# Patient Record
Sex: FEMALE | Race: WHITE | NOT HISPANIC OR LATINO | Employment: UNEMPLOYED | URBAN - METROPOLITAN AREA
[De-identification: names, ages, dates, MRNs, and addresses within clinical notes are randomized per-mention and may not be internally consistent; named-entity substitution may affect disease eponyms.]

---

## 2021-10-28 ENCOUNTER — HOSPITAL ENCOUNTER (INPATIENT)
Facility: HOSPITAL | Age: 41
LOS: 3 days | Discharge: HOME | DRG: 546 | End: 2021-10-31
Attending: EMERGENCY MEDICINE | Admitting: HOSPITALIST
Payer: COMMERCIAL

## 2021-10-28 ENCOUNTER — OFFICE VISIT (OUTPATIENT)
Dept: RHEUMATOLOGY | Facility: CLINIC | Age: 41
End: 2021-10-28
Payer: COMMERCIAL

## 2021-10-28 VITALS — WEIGHT: 293 LBS | HEIGHT: 65 IN | BODY MASS INDEX: 48.82 KG/M2

## 2021-10-28 DIAGNOSIS — M31.4 TAKAYASU'S ARTERITIS: Primary | ICD-10-CM

## 2021-10-28 DIAGNOSIS — M31.4 ARTERITIS, TAKAYASU: ICD-10-CM

## 2021-10-28 DIAGNOSIS — R07.9 CHEST PAIN: ICD-10-CM

## 2021-10-28 PROBLEM — E66.01 SEVERE OBESITY (BMI >= 40): Status: ACTIVE | Noted: 2021-10-28

## 2021-10-28 PROBLEM — R79.82 ELEVATED C-REACTIVE PROTEIN (CRP): Status: ACTIVE | Noted: 2021-10-28

## 2021-10-28 PROBLEM — F17.210 SMOKING GREATER THAN 20 PACK YEARS: Status: ACTIVE | Noted: 2021-10-28

## 2021-10-28 PROBLEM — R10.9 ABDOMINAL PAIN: Status: ACTIVE | Noted: 2021-10-28

## 2021-10-28 LAB
ALBUMIN SERPL BCP-MCNC: 4 G/DL (ref 3.5–5.2)
ALP SERPL-CCNC: 65 U/L (ref 55–135)
ALT SERPL W/O P-5'-P-CCNC: 37 U/L (ref 10–44)
ANION GAP SERPL CALC-SCNC: 10 MMOL/L (ref 8–16)
AST SERPL-CCNC: 23 U/L (ref 10–40)
BASOPHILS # BLD AUTO: 0.03 K/UL (ref 0–0.2)
BASOPHILS NFR BLD: 0.3 % (ref 0–1.9)
BILIRUB SERPL-MCNC: 0.4 MG/DL (ref 0.1–1)
BILIRUB UR QL STRIP: NEGATIVE
BNP SERPL-MCNC: <10 PG/ML (ref 0–99)
BUN SERPL-MCNC: 10 MG/DL (ref 6–20)
CALCIUM SERPL-MCNC: 9.7 MG/DL (ref 8.7–10.5)
CHLORIDE SERPL-SCNC: 103 MMOL/L (ref 95–110)
CLARITY UR REFRACT.AUTO: CLEAR
CO2 SERPL-SCNC: 23 MMOL/L (ref 23–29)
COLOR UR AUTO: YELLOW
CREAT SERPL-MCNC: 0.6 MG/DL (ref 0.5–1.4)
CRP SERPL-MCNC: 9.1 MG/L (ref 0–8.2)
CTP QC/QA: YES
DIFFERENTIAL METHOD: NORMAL
EOSINOPHIL # BLD AUTO: 0.2 K/UL (ref 0–0.5)
EOSINOPHIL NFR BLD: 2.6 % (ref 0–8)
ERYTHROCYTE [DISTWIDTH] IN BLOOD BY AUTOMATED COUNT: 13.1 % (ref 11.5–14.5)
ERYTHROCYTE [SEDIMENTATION RATE] IN BLOOD BY WESTERGREN METHOD: 21 MM/HR (ref 0–36)
EST. GFR  (AFRICAN AMERICAN): >60 ML/MIN/1.73 M^2
EST. GFR  (NON AFRICAN AMERICAN): >60 ML/MIN/1.73 M^2
ESTIMATED AVG GLUCOSE: 94 MG/DL (ref 68–131)
GLUCOSE SERPL-MCNC: 80 MG/DL (ref 70–110)
GLUCOSE UR QL STRIP: NEGATIVE
HBA1C MFR BLD: 4.9 % (ref 4–5.6)
HCT VFR BLD AUTO: 41.2 % (ref 37–48.5)
HGB BLD-MCNC: 14.1 G/DL (ref 12–16)
HGB UR QL STRIP: NEGATIVE
IMM GRANULOCYTES # BLD AUTO: 0.03 K/UL (ref 0–0.04)
IMM GRANULOCYTES NFR BLD AUTO: 0.3 % (ref 0–0.5)
KETONES UR QL STRIP: ABNORMAL
LEUKOCYTE ESTERASE UR QL STRIP: NEGATIVE
LYMPHOCYTES # BLD AUTO: 2 K/UL (ref 1–4.8)
LYMPHOCYTES NFR BLD: 22.5 % (ref 18–48)
MCH RBC QN AUTO: 29.9 PG (ref 27–31)
MCHC RBC AUTO-ENTMCNC: 34.2 G/DL (ref 32–36)
MCV RBC AUTO: 88 FL (ref 82–98)
MONOCYTES # BLD AUTO: 0.5 K/UL (ref 0.3–1)
MONOCYTES NFR BLD: 5.7 % (ref 4–15)
NEUTROPHILS # BLD AUTO: 6.2 K/UL (ref 1.8–7.7)
NEUTROPHILS NFR BLD: 68.6 % (ref 38–73)
NITRITE UR QL STRIP: NEGATIVE
NRBC BLD-RTO: 0 /100 WBC
PH UR STRIP: 5 [PH] (ref 5–8)
PLATELET # BLD AUTO: 203 K/UL (ref 150–450)
PMV BLD AUTO: 11 FL (ref 9.2–12.9)
POTASSIUM SERPL-SCNC: 3.7 MMOL/L (ref 3.5–5.1)
PROT SERPL-MCNC: 7.2 G/DL (ref 6–8.4)
PROT UR QL STRIP: NEGATIVE
RBC # BLD AUTO: 4.71 M/UL (ref 4–5.4)
SARS-COV-2 RDRP RESP QL NAA+PROBE: NEGATIVE
SODIUM SERPL-SCNC: 136 MMOL/L (ref 136–145)
SP GR UR STRIP: 1.01 (ref 1–1.03)
TROPONIN I SERPL DL<=0.01 NG/ML-MCNC: <0.006 NG/ML (ref 0–0.03)
URN SPEC COLLECT METH UR: ABNORMAL
WBC # BLD AUTO: 8.99 K/UL (ref 3.9–12.7)

## 2021-10-28 PROCEDURE — 84484 ASSAY OF TROPONIN QUANT: CPT | Performed by: EMERGENCY MEDICINE

## 2021-10-28 PROCEDURE — 3008F BODY MASS INDEX DOCD: CPT | Mod: CPTII,S$GLB,, | Performed by: INTERNAL MEDICINE

## 2021-10-28 PROCEDURE — 99223 1ST HOSP IP/OBS HIGH 75: CPT | Mod: ,,, | Performed by: HOSPITALIST

## 2021-10-28 PROCEDURE — 85025 COMPLETE CBC W/AUTO DIFF WBC: CPT | Performed by: EMERGENCY MEDICINE

## 2021-10-28 PROCEDURE — 25000003 PHARM REV CODE 250

## 2021-10-28 PROCEDURE — 86140 C-REACTIVE PROTEIN: CPT | Performed by: EMERGENCY MEDICINE

## 2021-10-28 PROCEDURE — 99285 EMERGENCY DEPT VISIT HI MDM: CPT | Mod: 25

## 2021-10-28 PROCEDURE — 83880 ASSAY OF NATRIURETIC PEPTIDE: CPT | Performed by: EMERGENCY MEDICINE

## 2021-10-28 PROCEDURE — 3008F PR BODY MASS INDEX (BMI) DOCUMENTED: ICD-10-PCS | Mod: CPTII,S$GLB,, | Performed by: INTERNAL MEDICINE

## 2021-10-28 PROCEDURE — 99284 PR EMERGENCY DEPT VISIT,LEVEL IV: ICD-10-PCS | Mod: CS,,, | Performed by: EMERGENCY MEDICINE

## 2021-10-28 PROCEDURE — 99205 OFFICE O/P NEW HI 60 MIN: CPT | Mod: S$GLB,,, | Performed by: INTERNAL MEDICINE

## 2021-10-28 PROCEDURE — 25500020 PHARM REV CODE 255: Performed by: HOSPITALIST

## 2021-10-28 PROCEDURE — 36415 COLL VENOUS BLD VENIPUNCTURE: CPT

## 2021-10-28 PROCEDURE — U0002 COVID-19 LAB TEST NON-CDC: HCPCS | Performed by: EMERGENCY MEDICINE

## 2021-10-28 PROCEDURE — 99205 PR OFFICE/OUTPT VISIT, NEW, LEVL V, 60-74 MIN: ICD-10-PCS | Mod: S$GLB,,, | Performed by: INTERNAL MEDICINE

## 2021-10-28 PROCEDURE — 80053 COMPREHEN METABOLIC PANEL: CPT | Performed by: EMERGENCY MEDICINE

## 2021-10-28 PROCEDURE — 81003 URINALYSIS AUTO W/O SCOPE: CPT | Performed by: EMERGENCY MEDICINE

## 2021-10-28 PROCEDURE — 11000001 HC ACUTE MED/SURG PRIVATE ROOM

## 2021-10-28 PROCEDURE — 99999 PR PBB SHADOW E&M-NEW PATIENT-LVL II: CPT | Mod: PBBFAC,,, | Performed by: INTERNAL MEDICINE

## 2021-10-28 PROCEDURE — 83036 HEMOGLOBIN GLYCOSYLATED A1C: CPT

## 2021-10-28 PROCEDURE — 99223 PR INITIAL HOSPITAL CARE,LEVL III: ICD-10-PCS | Mod: ,,, | Performed by: HOSPITALIST

## 2021-10-28 PROCEDURE — 99999 PR PBB SHADOW E&M-NEW PATIENT-LVL II: ICD-10-PCS | Mod: PBBFAC,,, | Performed by: INTERNAL MEDICINE

## 2021-10-28 PROCEDURE — 1159F MED LIST DOCD IN RCRD: CPT | Mod: CPTII,S$GLB,, | Performed by: INTERNAL MEDICINE

## 2021-10-28 PROCEDURE — 85652 RBC SED RATE AUTOMATED: CPT | Performed by: EMERGENCY MEDICINE

## 2021-10-28 PROCEDURE — 1159F PR MEDICATION LIST DOCUMENTED IN MEDICAL RECORD: ICD-10-PCS | Mod: CPTII,S$GLB,, | Performed by: INTERNAL MEDICINE

## 2021-10-28 PROCEDURE — 99284 EMERGENCY DEPT VISIT MOD MDM: CPT | Mod: CS,,, | Performed by: EMERGENCY MEDICINE

## 2021-10-28 RX ORDER — AMOXICILLIN 250 MG
1 CAPSULE ORAL DAILY PRN
Status: DISCONTINUED | OUTPATIENT
Start: 2021-10-28 | End: 2021-10-31 | Stop reason: HOSPADM

## 2021-10-28 RX ORDER — ENOXAPARIN SODIUM 100 MG/ML
40 INJECTION SUBCUTANEOUS EVERY 12 HOURS
Status: DISCONTINUED | OUTPATIENT
Start: 2021-10-28 | End: 2021-10-28

## 2021-10-28 RX ORDER — AMOXICILLIN 250 MG
1 CAPSULE ORAL 2 TIMES DAILY
Status: DISCONTINUED | OUTPATIENT
Start: 2021-10-28 | End: 2021-10-28

## 2021-10-28 RX ORDER — ONDANSETRON 8 MG/1
8 TABLET, ORALLY DISINTEGRATING ORAL EVERY 8 HOURS PRN
Status: DISCONTINUED | OUTPATIENT
Start: 2021-10-28 | End: 2021-10-31 | Stop reason: HOSPADM

## 2021-10-28 RX ORDER — IBUPROFEN 200 MG
24 TABLET ORAL
Status: DISCONTINUED | OUTPATIENT
Start: 2021-10-28 | End: 2021-10-31 | Stop reason: HOSPADM

## 2021-10-28 RX ORDER — IBUPROFEN 200 MG
16 TABLET ORAL
Status: DISCONTINUED | OUTPATIENT
Start: 2021-10-28 | End: 2021-10-31 | Stop reason: HOSPADM

## 2021-10-28 RX ORDER — SODIUM CHLORIDE 0.9 % (FLUSH) 0.9 %
10 SYRINGE (ML) INJECTION EVERY 12 HOURS PRN
Status: DISCONTINUED | OUTPATIENT
Start: 2021-10-28 | End: 2021-10-31 | Stop reason: HOSPADM

## 2021-10-28 RX ORDER — ACETAMINOPHEN 500 MG
1000 TABLET ORAL EVERY 8 HOURS PRN
Status: DISCONTINUED | OUTPATIENT
Start: 2021-10-28 | End: 2021-10-31 | Stop reason: HOSPADM

## 2021-10-28 RX ORDER — GLUCAGON 1 MG
1 KIT INJECTION
Status: DISCONTINUED | OUTPATIENT
Start: 2021-10-28 | End: 2021-10-31 | Stop reason: HOSPADM

## 2021-10-28 RX ORDER — IBUPROFEN 200 MG
1 TABLET ORAL DAILY
Status: DISCONTINUED | OUTPATIENT
Start: 2021-10-29 | End: 2021-10-28

## 2021-10-28 RX ORDER — NALOXONE HCL 0.4 MG/ML
0.02 VIAL (ML) INJECTION
Status: DISCONTINUED | OUTPATIENT
Start: 2021-10-28 | End: 2021-10-31 | Stop reason: HOSPADM

## 2021-10-28 RX ORDER — NAPROXEN 250 MG/1
500 TABLET ORAL NIGHTLY PRN
COMMUNITY
Start: 2021-09-16 | End: 2021-12-22

## 2021-10-28 RX ORDER — ACETAMINOPHEN 500 MG
2000 TABLET ORAL NIGHTLY PRN
Status: ON HOLD | COMMUNITY
Start: 2021-10-11 | End: 2021-10-30 | Stop reason: HOSPADM

## 2021-10-28 RX ADMIN — ACETAMINOPHEN 1000 MG: 500 TABLET ORAL at 10:10

## 2021-10-28 RX ADMIN — IOHEXOL 100 ML: 350 INJECTION, SOLUTION INTRAVENOUS at 07:10

## 2021-10-29 PROBLEM — H53.8 BLURRED VISION, RIGHT EYE: Status: ACTIVE | Noted: 2021-10-29

## 2021-10-29 LAB
ALBUMIN SERPL BCP-MCNC: 3.5 G/DL (ref 3.5–5.2)
ALP SERPL-CCNC: 66 U/L (ref 55–135)
ALT SERPL W/O P-5'-P-CCNC: 30 U/L (ref 10–44)
ANION GAP SERPL CALC-SCNC: 10 MMOL/L (ref 8–16)
ASCENDING AORTA: 3.29 CM
AST SERPL-CCNC: 17 U/L (ref 10–40)
AV INDEX (PROSTH): 0.48
AV MEAN GRADIENT: 5 MMHG
AV PEAK GRADIENT: 7 MMHG
AV VALVE AREA: 1.64 CM2
AV VELOCITY RATIO: 0.53
BASOPHILS # BLD AUTO: 0.04 K/UL (ref 0–0.2)
BASOPHILS NFR BLD: 0.6 % (ref 0–1.9)
BILIRUB SERPL-MCNC: 0.3 MG/DL (ref 0.1–1)
BSA FOR ECHO PROCEDURE: 2.65 M2
BUN SERPL-MCNC: 11 MG/DL (ref 6–20)
CALCIUM SERPL-MCNC: 9.1 MG/DL (ref 8.7–10.5)
CHLORIDE SERPL-SCNC: 104 MMOL/L (ref 95–110)
CO2 SERPL-SCNC: 23 MMOL/L (ref 23–29)
CREAT SERPL-MCNC: 0.6 MG/DL (ref 0.5–1.4)
CV ECHO LV RWT: 0.29 CM
DIFFERENTIAL METHOD: NORMAL
DOP CALC AO PEAK VEL: 1.28 M/S
DOP CALC AO VTI: 26.97 CM
DOP CALC LVOT AREA: 3.4 CM2
DOP CALC LVOT DIAMETER: 2.09 CM
DOP CALC LVOT PEAK VEL: 0.68 M/S
DOP CALC LVOT STROKE VOLUME: 44.34 CM3
DOP CALCLVOT PEAK VEL VTI: 12.93 CM
E WAVE DECELERATION TIME: 140.46 MSEC
E/A RATIO: 0.96
E/E' RATIO: 8.11 M/S
ECHO LV POSTERIOR WALL: 0.75 CM (ref 0.6–1.1)
EJECTION FRACTION: 50 %
EOSINOPHIL # BLD AUTO: 0.2 K/UL (ref 0–0.5)
EOSINOPHIL NFR BLD: 3.4 % (ref 0–8)
ERYTHROCYTE [DISTWIDTH] IN BLOOD BY AUTOMATED COUNT: 12.9 % (ref 11.5–14.5)
EST. GFR  (AFRICAN AMERICAN): >60 ML/MIN/1.73 M^2
EST. GFR  (NON AFRICAN AMERICAN): >60 ML/MIN/1.73 M^2
FRACTIONAL SHORTENING: 29 % (ref 28–44)
GLUCOSE SERPL-MCNC: 100 MG/DL (ref 70–110)
HCT VFR BLD AUTO: 38.1 % (ref 37–48.5)
HGB BLD-MCNC: 12.8 G/DL (ref 12–16)
IMM GRANULOCYTES # BLD AUTO: 0.02 K/UL (ref 0–0.04)
IMM GRANULOCYTES NFR BLD AUTO: 0.3 % (ref 0–0.5)
INTERVENTRICULAR SEPTUM: 0.88 CM (ref 0.6–1.1)
IVRT: 142.72 MSEC
LA MAJOR: 5.61 CM
LA MINOR: 5.55 CM
LA WIDTH: 3.69 CM
LEFT ATRIUM SIZE: 3.65 CM
LEFT ATRIUM VOLUME INDEX MOD: 24.3 ML/M2
LEFT ATRIUM VOLUME INDEX: 25.9 ML/M2
LEFT ATRIUM VOLUME MOD: 59.9 CM3
LEFT ATRIUM VOLUME: 63.88 CM3
LEFT INTERNAL DIMENSION IN SYSTOLE: 3.68 CM (ref 2.1–4)
LEFT VENTRICLE DIASTOLIC VOLUME INDEX: 52.74 ML/M2
LEFT VENTRICLE DIASTOLIC VOLUME: 130.27 ML
LEFT VENTRICLE MASS INDEX: 60 G/M2
LEFT VENTRICLE SYSTOLIC VOLUME INDEX: 23.2 ML/M2
LEFT VENTRICLE SYSTOLIC VOLUME: 57.2 ML
LEFT VENTRICULAR INTERNAL DIMENSION IN DIASTOLE: 5.21 CM (ref 3.5–6)
LEFT VENTRICULAR MASS: 149.19 G
LV LATERAL E/E' RATIO: 7.7 M/S
LV SEPTAL E/E' RATIO: 8.56 M/S
LYMPHOCYTES # BLD AUTO: 1.9 K/UL (ref 1–4.8)
LYMPHOCYTES NFR BLD: 28.5 % (ref 18–48)
MAGNESIUM SERPL-MCNC: 1.8 MG/DL (ref 1.6–2.6)
MCH RBC QN AUTO: 29.5 PG (ref 27–31)
MCHC RBC AUTO-ENTMCNC: 33.6 G/DL (ref 32–36)
MCV RBC AUTO: 88 FL (ref 82–98)
MONOCYTES # BLD AUTO: 0.6 K/UL (ref 0.3–1)
MONOCYTES NFR BLD: 8.2 % (ref 4–15)
MV PEAK A VEL: 0.8 M/S
MV PEAK E VEL: 0.77 M/S
MV STENOSIS PRESSURE HALF TIME: 40.73 MS
MV VALVE AREA P 1/2 METHOD: 5.4 CM2
NEUTROPHILS # BLD AUTO: 3.9 K/UL (ref 1.8–7.7)
NEUTROPHILS NFR BLD: 59 % (ref 38–73)
NRBC BLD-RTO: 0 /100 WBC
PHOSPHATE SERPL-MCNC: 3.5 MG/DL (ref 2.7–4.5)
PLATELET # BLD AUTO: 190 K/UL (ref 150–450)
PMV BLD AUTO: 10.9 FL (ref 9.2–12.9)
POTASSIUM SERPL-SCNC: 3.6 MMOL/L (ref 3.5–5.1)
PROT SERPL-MCNC: 6.4 G/DL (ref 6–8.4)
RA MAJOR: 5.16 CM
RA PRESSURE: 3 MMHG
RA WIDTH: 3.25 CM
RBC # BLD AUTO: 4.34 M/UL (ref 4–5.4)
RV TISSUE DOPPLER FREE WALL SYSTOLIC VELOCITY 1 (APICAL 4 CHAMBER VIEW): 12.65 CM/S
SINUS: 3.26 CM
SODIUM SERPL-SCNC: 137 MMOL/L (ref 136–145)
STJ: 2.65 CM
TDI LATERAL: 0.1 M/S
TDI SEPTAL: 0.09 M/S
TDI: 0.1 M/S
TRICUSPID ANNULAR PLANE SYSTOLIC EXCURSION: 2.16 CM
WBC # BLD AUTO: 6.67 K/UL (ref 3.9–12.7)

## 2021-10-29 PROCEDURE — 99222 1ST HOSP IP/OBS MODERATE 55: CPT | Mod: ,,, | Performed by: INTERNAL MEDICINE

## 2021-10-29 PROCEDURE — 87389 HIV-1 AG W/HIV-1&-2 AB AG IA: CPT | Performed by: STUDENT IN AN ORGANIZED HEALTH CARE EDUCATION/TRAINING PROGRAM

## 2021-10-29 PROCEDURE — 86803 HEPATITIS C AB TEST: CPT | Performed by: STUDENT IN AN ORGANIZED HEALTH CARE EDUCATION/TRAINING PROGRAM

## 2021-10-29 PROCEDURE — 25000003 PHARM REV CODE 250

## 2021-10-29 PROCEDURE — 99232 SBSQ HOSP IP/OBS MODERATE 35: CPT | Mod: ,,, | Performed by: HOSPITALIST

## 2021-10-29 PROCEDURE — 25500020 PHARM REV CODE 255: Performed by: HOSPITALIST

## 2021-10-29 PROCEDURE — 86480 TB TEST CELL IMMUN MEASURE: CPT | Performed by: STUDENT IN AN ORGANIZED HEALTH CARE EDUCATION/TRAINING PROGRAM

## 2021-10-29 PROCEDURE — 99232 PR SUBSEQUENT HOSPITAL CARE,LEVL II: ICD-10-PCS | Mod: ,,, | Performed by: HOSPITALIST

## 2021-10-29 PROCEDURE — 80053 COMPREHEN METABOLIC PANEL: CPT

## 2021-10-29 PROCEDURE — 86592 SYPHILIS TEST NON-TREP QUAL: CPT | Performed by: STUDENT IN AN ORGANIZED HEALTH CARE EDUCATION/TRAINING PROGRAM

## 2021-10-29 PROCEDURE — 86682 HELMINTH ANTIBODY: CPT | Performed by: STUDENT IN AN ORGANIZED HEALTH CARE EDUCATION/TRAINING PROGRAM

## 2021-10-29 PROCEDURE — 86787 VARICELLA-ZOSTER ANTIBODY: CPT | Performed by: STUDENT IN AN ORGANIZED HEALTH CARE EDUCATION/TRAINING PROGRAM

## 2021-10-29 PROCEDURE — 36415 COLL VENOUS BLD VENIPUNCTURE: CPT

## 2021-10-29 PROCEDURE — 84100 ASSAY OF PHOSPHORUS: CPT

## 2021-10-29 PROCEDURE — 86704 HEP B CORE ANTIBODY TOTAL: CPT | Performed by: STUDENT IN AN ORGANIZED HEALTH CARE EDUCATION/TRAINING PROGRAM

## 2021-10-29 PROCEDURE — 99223 1ST HOSP IP/OBS HIGH 75: CPT | Mod: ,,, | Performed by: SURGERY

## 2021-10-29 PROCEDURE — 83520 IMMUNOASSAY QUANT NOS NONAB: CPT | Performed by: STUDENT IN AN ORGANIZED HEALTH CARE EDUCATION/TRAINING PROGRAM

## 2021-10-29 PROCEDURE — 85025 COMPLETE CBC W/AUTO DIFF WBC: CPT

## 2021-10-29 PROCEDURE — 87340 HEPATITIS B SURFACE AG IA: CPT | Performed by: STUDENT IN AN ORGANIZED HEALTH CARE EDUCATION/TRAINING PROGRAM

## 2021-10-29 PROCEDURE — 99222 PR INITIAL HOSPITAL CARE,LEVL II: ICD-10-PCS | Mod: ,,, | Performed by: INTERNAL MEDICINE

## 2021-10-29 PROCEDURE — 99223 PR INITIAL HOSPITAL CARE,LEVL III: ICD-10-PCS | Mod: ,,, | Performed by: SURGERY

## 2021-10-29 PROCEDURE — 83735 ASSAY OF MAGNESIUM: CPT

## 2021-10-29 PROCEDURE — 11000001 HC ACUTE MED/SURG PRIVATE ROOM

## 2021-10-29 PROCEDURE — 86706 HEP B SURFACE ANTIBODY: CPT | Performed by: STUDENT IN AN ORGANIZED HEALTH CARE EDUCATION/TRAINING PROGRAM

## 2021-10-29 PROCEDURE — 86790 VIRUS ANTIBODY NOS: CPT | Performed by: STUDENT IN AN ORGANIZED HEALTH CARE EDUCATION/TRAINING PROGRAM

## 2021-10-29 RX ORDER — CALCIUM CARBONATE 200(500)MG
1000 TABLET,CHEWABLE ORAL DAILY
Status: DISCONTINUED | OUTPATIENT
Start: 2021-10-30 | End: 2021-10-31 | Stop reason: HOSPADM

## 2021-10-29 RX ORDER — PANTOPRAZOLE SODIUM 40 MG/1
40 TABLET, DELAYED RELEASE ORAL DAILY
Status: DISCONTINUED | OUTPATIENT
Start: 2021-10-30 | End: 2021-10-31 | Stop reason: HOSPADM

## 2021-10-29 RX ORDER — SULFAMETHOXAZOLE AND TRIMETHOPRIM 800; 160 MG/1; MG/1
1 TABLET ORAL DAILY
Status: DISCONTINUED | OUTPATIENT
Start: 2021-10-30 | End: 2021-10-31 | Stop reason: HOSPADM

## 2021-10-29 RX ORDER — PREDNISONE 20 MG/1
40 TABLET ORAL DAILY
Status: DISCONTINUED | OUTPATIENT
Start: 2021-10-30 | End: 2021-10-31 | Stop reason: HOSPADM

## 2021-10-29 RX ORDER — CHOLECALCIFEROL (VITAMIN D3) 25 MCG
1000 TABLET ORAL DAILY
Status: DISCONTINUED | OUTPATIENT
Start: 2021-10-30 | End: 2021-10-31 | Stop reason: HOSPADM

## 2021-10-29 RX ADMIN — ACETAMINOPHEN 1000 MG: 500 TABLET ORAL at 01:10

## 2021-10-29 RX ADMIN — IOHEXOL 100 ML: 350 INJECTION, SOLUTION INTRAVENOUS at 04:10

## 2021-10-29 RX ADMIN — HUMAN ALBUMIN MICROSPHERES AND PERFLUTREN 0.66 MG: 10; .22 INJECTION, SOLUTION INTRAVENOUS at 10:10

## 2021-10-30 VITALS
OXYGEN SATURATION: 91 % | HEIGHT: 65 IN | SYSTOLIC BLOOD PRESSURE: 127 MMHG | RESPIRATION RATE: 18 BRPM | DIASTOLIC BLOOD PRESSURE: 61 MMHG | HEART RATE: 74 BPM | WEIGHT: 293 LBS | TEMPERATURE: 98 F | BODY MASS INDEX: 48.82 KG/M2

## 2021-10-30 LAB
ALBUMIN SERPL BCP-MCNC: 3.5 G/DL (ref 3.5–5.2)
ALP SERPL-CCNC: 63 U/L (ref 55–135)
ALT SERPL W/O P-5'-P-CCNC: 30 U/L (ref 10–44)
ANION GAP SERPL CALC-SCNC: 10 MMOL/L (ref 8–16)
AST SERPL-CCNC: 15 U/L (ref 10–40)
BASOPHILS # BLD AUTO: 0.03 K/UL (ref 0–0.2)
BASOPHILS NFR BLD: 0.5 % (ref 0–1.9)
BILIRUB SERPL-MCNC: 0.3 MG/DL (ref 0.1–1)
BUN SERPL-MCNC: 8 MG/DL (ref 6–20)
CALCIUM SERPL-MCNC: 9.1 MG/DL (ref 8.7–10.5)
CHLORIDE SERPL-SCNC: 105 MMOL/L (ref 95–110)
CO2 SERPL-SCNC: 23 MMOL/L (ref 23–29)
CREAT SERPL-MCNC: 0.6 MG/DL (ref 0.5–1.4)
DIFFERENTIAL METHOD: NORMAL
EOSINOPHIL # BLD AUTO: 0.2 K/UL (ref 0–0.5)
EOSINOPHIL NFR BLD: 2.9 % (ref 0–8)
ERYTHROCYTE [DISTWIDTH] IN BLOOD BY AUTOMATED COUNT: 12.9 % (ref 11.5–14.5)
EST. GFR  (AFRICAN AMERICAN): >60 ML/MIN/1.73 M^2
EST. GFR  (NON AFRICAN AMERICAN): >60 ML/MIN/1.73 M^2
GLUCOSE SERPL-MCNC: 96 MG/DL (ref 70–110)
HCT VFR BLD AUTO: 39.2 % (ref 37–48.5)
HGB BLD-MCNC: 12.7 G/DL (ref 12–16)
IMM GRANULOCYTES # BLD AUTO: 0.02 K/UL (ref 0–0.04)
IMM GRANULOCYTES NFR BLD AUTO: 0.3 % (ref 0–0.5)
LYMPHOCYTES # BLD AUTO: 1.5 K/UL (ref 1–4.8)
LYMPHOCYTES NFR BLD: 24.5 % (ref 18–48)
MAGNESIUM SERPL-MCNC: 1.8 MG/DL (ref 1.6–2.6)
MCH RBC QN AUTO: 28.9 PG (ref 27–31)
MCHC RBC AUTO-ENTMCNC: 32.4 G/DL (ref 32–36)
MCV RBC AUTO: 89 FL (ref 82–98)
MONOCYTES # BLD AUTO: 0.5 K/UL (ref 0.3–1)
MONOCYTES NFR BLD: 8 % (ref 4–15)
NEUTROPHILS # BLD AUTO: 4 K/UL (ref 1.8–7.7)
NEUTROPHILS NFR BLD: 63.8 % (ref 38–73)
NRBC BLD-RTO: 0 /100 WBC
PHOSPHATE SERPL-MCNC: 2.8 MG/DL (ref 2.7–4.5)
PLATELET # BLD AUTO: 182 K/UL (ref 150–450)
PMV BLD AUTO: 10.9 FL (ref 9.2–12.9)
POTASSIUM SERPL-SCNC: 4 MMOL/L (ref 3.5–5.1)
PROT SERPL-MCNC: 6.5 G/DL (ref 6–8.4)
RBC # BLD AUTO: 4.4 M/UL (ref 4–5.4)
RPR SER QL: NORMAL
SODIUM SERPL-SCNC: 138 MMOL/L (ref 136–145)
WBC # BLD AUTO: 6.28 K/UL (ref 3.9–12.7)

## 2021-10-30 PROCEDURE — 11000001 HC ACUTE MED/SURG PRIVATE ROOM

## 2021-10-30 PROCEDURE — 36415 COLL VENOUS BLD VENIPUNCTURE: CPT

## 2021-10-30 PROCEDURE — 25000003 PHARM REV CODE 250: Performed by: STUDENT IN AN ORGANIZED HEALTH CARE EDUCATION/TRAINING PROGRAM

## 2021-10-30 PROCEDURE — 25500020 PHARM REV CODE 255: Performed by: HOSPITALIST

## 2021-10-30 PROCEDURE — 63600175 PHARM REV CODE 636 W HCPCS: Performed by: STUDENT IN AN ORGANIZED HEALTH CARE EDUCATION/TRAINING PROGRAM

## 2021-10-30 PROCEDURE — 25000003 PHARM REV CODE 250

## 2021-10-30 PROCEDURE — 80053 COMPREHEN METABOLIC PANEL: CPT

## 2021-10-30 PROCEDURE — 99239 HOSP IP/OBS DSCHRG MGMT >30: CPT | Mod: ,,, | Performed by: HOSPITALIST

## 2021-10-30 PROCEDURE — 85025 COMPLETE CBC W/AUTO DIFF WBC: CPT

## 2021-10-30 PROCEDURE — 99239 PR HOSPITAL DISCHARGE DAY,>30 MIN: ICD-10-PCS | Mod: ,,, | Performed by: HOSPITALIST

## 2021-10-30 PROCEDURE — 84100 ASSAY OF PHOSPHORUS: CPT

## 2021-10-30 PROCEDURE — 83735 ASSAY OF MAGNESIUM: CPT

## 2021-10-30 RX ORDER — PREDNISONE 20 MG/1
40 TABLET ORAL DAILY
Qty: 60 TABLET | Refills: 2 | Status: SHIPPED | OUTPATIENT
Start: 2021-10-31 | End: 2021-12-22

## 2021-10-30 RX ORDER — DICYCLOMINE HYDROCHLORIDE 10 MG/5ML
20 SOLUTION ORAL ONCE
Status: DISCONTINUED | OUTPATIENT
Start: 2021-10-30 | End: 2021-10-31 | Stop reason: HOSPADM

## 2021-10-30 RX ORDER — CALCIUM CARBONATE 200(500)MG
1000 TABLET,CHEWABLE ORAL DAILY
Qty: 60 TABLET | Refills: 11 | Status: SHIPPED | OUTPATIENT
Start: 2021-10-31 | End: 2023-01-03

## 2021-10-30 RX ORDER — ACETAMINOPHEN 500 MG
1000 TABLET ORAL EVERY 8 HOURS PRN
Qty: 42 TABLET | Refills: 0 | Status: SHIPPED | OUTPATIENT
Start: 2021-10-30

## 2021-10-30 RX ORDER — ALUMINUM HYDROXIDE, MAGNESIUM HYDROXIDE, AND SIMETHICONE 2400; 240; 2400 MG/30ML; MG/30ML; MG/30ML
30 SUSPENSION ORAL EVERY 6 HOURS PRN
Qty: 1000 ML | Refills: 0 | Status: SHIPPED | OUTPATIENT
Start: 2021-10-30 | End: 2021-12-03

## 2021-10-30 RX ORDER — PANTOPRAZOLE SODIUM 40 MG/1
40 TABLET, DELAYED RELEASE ORAL DAILY
Qty: 30 TABLET | Refills: 11 | Status: SHIPPED | OUTPATIENT
Start: 2021-10-31 | End: 2022-02-15

## 2021-10-30 RX ORDER — CHOLECALCIFEROL (VITAMIN D3) 25 MCG
1000 TABLET ORAL DAILY
Qty: 30 TABLET | Refills: 2 | Status: SHIPPED | OUTPATIENT
Start: 2021-10-31

## 2021-10-30 RX ORDER — SULFAMETHOXAZOLE AND TRIMETHOPRIM 800; 160 MG/1; MG/1
1 TABLET ORAL DAILY
Qty: 30 TABLET | Refills: 1 | Status: SHIPPED | OUTPATIENT
Start: 2021-10-31 | End: 2021-11-15

## 2021-10-30 RX ORDER — LIDOCAINE HYDROCHLORIDE 20 MG/ML
10 SOLUTION OROPHARYNGEAL ONCE
Status: DISCONTINUED | OUTPATIENT
Start: 2021-10-30 | End: 2021-10-31 | Stop reason: HOSPADM

## 2021-10-30 RX ORDER — ALUMINUM HYDROXIDE, MAGNESIUM HYDROXIDE, AND SIMETHICONE 2400; 240; 2400 MG/30ML; MG/30ML; MG/30ML
30 SUSPENSION ORAL ONCE
Status: DISCONTINUED | OUTPATIENT
Start: 2021-10-30 | End: 2021-10-31 | Stop reason: HOSPADM

## 2021-10-30 RX ADMIN — PANTOPRAZOLE SODIUM 40 MG: 40 TABLET, DELAYED RELEASE ORAL at 09:10

## 2021-10-30 RX ADMIN — IOHEXOL 100 ML: 350 INJECTION, SOLUTION INTRAVENOUS at 06:10

## 2021-10-30 RX ADMIN — ACETAMINOPHEN 1000 MG: 500 TABLET ORAL at 09:10

## 2021-10-30 RX ADMIN — PREDNISONE 40 MG: 20 TABLET ORAL at 09:10

## 2021-10-30 RX ADMIN — Medication 1000 UNITS: at 09:10

## 2021-10-30 RX ADMIN — SULFAMETHOXAZOLE AND TRIMETHOPRIM 1 TABLET: 800; 160 TABLET ORAL at 09:10

## 2021-10-30 RX ADMIN — CALCIUM CARBONATE (ANTACID) CHEW TAB 500 MG 1000 MG: 500 CHEW TAB at 09:10

## 2021-11-01 ENCOUNTER — DOCUMENTATION ONLY (OUTPATIENT)
Dept: RHEUMATOLOGY | Facility: CLINIC | Age: 41
End: 2021-11-01
Payer: COMMERCIAL

## 2021-11-01 LAB
GAMMA INTERFERON BACKGROUND BLD IA-ACNC: 0.04 IU/ML
HBV CORE AB SERPL QL IA: NEGATIVE
HBV SURFACE AB SER-ACNC: POSITIVE M[IU]/ML
HBV SURFACE AG SERPL QL IA: NEGATIVE
HCV AB SERPL QL IA: NEGATIVE
HEPATITIS A ANTIBODY, IGG: NEGATIVE
HIV 1+2 AB+HIV1 P24 AG SERPL QL IA: NEGATIVE
IL6 SERPL-MCNC: 3.4 PG/ML
M TB IFN-G CD4+ BCKGRND COR BLD-ACNC: 0.01 IU/ML
MITOGEN IGNF BCKGRD COR BLD-ACNC: 8.62 IU/ML
STRONGYLOIDES ANTIBODY IGG: NEGATIVE
TB GOLD PLUS: NEGATIVE
TB2 - NIL: -0.01 IU/ML

## 2021-11-02 RX ORDER — ONDANSETRON 4 MG/1
TABLET, FILM COATED ORAL
Qty: 60 TABLET | Refills: 1 | Status: SHIPPED | OUTPATIENT
Start: 2021-11-02 | End: 2022-05-12

## 2021-11-03 LAB
VARICELLA INTERPRETATION: POSITIVE
VARICELLA ZOSTER IGG: 2.62 ISR (ref 0–0.9)

## 2021-11-11 ENCOUNTER — OFFICE VISIT (OUTPATIENT)
Dept: URGENT CARE | Facility: CLINIC | Age: 41
End: 2021-11-11
Payer: COMMERCIAL

## 2021-11-11 VITALS
SYSTOLIC BLOOD PRESSURE: 139 MMHG | BODY MASS INDEX: 56.08 KG/M2 | OXYGEN SATURATION: 97 % | WEIGHT: 293 LBS | RESPIRATION RATE: 16 BRPM | TEMPERATURE: 98 F | HEART RATE: 100 BPM | DIASTOLIC BLOOD PRESSURE: 89 MMHG

## 2021-11-11 DIAGNOSIS — R11.0 NAUSEA: ICD-10-CM

## 2021-11-11 DIAGNOSIS — R50.9 FEVER, UNSPECIFIED FEVER CAUSE: Primary | ICD-10-CM

## 2021-11-11 LAB
CTP QC/QA: YES
SARS-COV-2 RDRP RESP QL NAA+PROBE: NEGATIVE

## 2021-11-11 PROCEDURE — 3044F HG A1C LEVEL LT 7.0%: CPT | Mod: CPTII,S$GLB,, | Performed by: EMERGENCY MEDICINE

## 2021-11-11 PROCEDURE — 3075F SYST BP GE 130 - 139MM HG: CPT | Mod: CPTII,S$GLB,, | Performed by: EMERGENCY MEDICINE

## 2021-11-11 PROCEDURE — 71046 XR CHEST PA AND LATERAL: ICD-10-PCS | Mod: S$GLB,,, | Performed by: RADIOLOGY

## 2021-11-11 PROCEDURE — 1160F RVW MEDS BY RX/DR IN RCRD: CPT | Mod: CPTII,S$GLB,, | Performed by: EMERGENCY MEDICINE

## 2021-11-11 PROCEDURE — 1160F PR REVIEW ALL MEDS BY PRESCRIBER/CLIN PHARMACIST DOCUMENTED: ICD-10-PCS | Mod: CPTII,S$GLB,, | Performed by: EMERGENCY MEDICINE

## 2021-11-11 PROCEDURE — 71046 X-RAY EXAM CHEST 2 VIEWS: CPT | Mod: S$GLB,,, | Performed by: RADIOLOGY

## 2021-11-11 PROCEDURE — 99203 PR OFFICE/OUTPT VISIT, NEW, LEVL III, 30-44 MIN: ICD-10-PCS | Mod: S$GLB,,, | Performed by: EMERGENCY MEDICINE

## 2021-11-11 PROCEDURE — 1159F MED LIST DOCD IN RCRD: CPT | Mod: CPTII,S$GLB,, | Performed by: EMERGENCY MEDICINE

## 2021-11-11 PROCEDURE — 3008F BODY MASS INDEX DOCD: CPT | Mod: CPTII,S$GLB,, | Performed by: EMERGENCY MEDICINE

## 2021-11-11 PROCEDURE — 3079F PR MOST RECENT DIASTOLIC BLOOD PRESSURE 80-89 MM HG: ICD-10-PCS | Mod: CPTII,S$GLB,, | Performed by: EMERGENCY MEDICINE

## 2021-11-11 PROCEDURE — 3075F PR MOST RECENT SYSTOLIC BLOOD PRESS GE 130-139MM HG: ICD-10-PCS | Mod: CPTII,S$GLB,, | Performed by: EMERGENCY MEDICINE

## 2021-11-11 PROCEDURE — 1159F PR MEDICATION LIST DOCUMENTED IN MEDICAL RECORD: ICD-10-PCS | Mod: CPTII,S$GLB,, | Performed by: EMERGENCY MEDICINE

## 2021-11-11 PROCEDURE — 3044F PR MOST RECENT HEMOGLOBIN A1C LEVEL <7.0%: ICD-10-PCS | Mod: CPTII,S$GLB,, | Performed by: EMERGENCY MEDICINE

## 2021-11-11 PROCEDURE — 3079F DIAST BP 80-89 MM HG: CPT | Mod: CPTII,S$GLB,, | Performed by: EMERGENCY MEDICINE

## 2021-11-11 PROCEDURE — U0002 COVID-19 LAB TEST NON-CDC: HCPCS | Mod: QW,S$GLB,, | Performed by: EMERGENCY MEDICINE

## 2021-11-11 PROCEDURE — 3008F PR BODY MASS INDEX (BMI) DOCUMENTED: ICD-10-PCS | Mod: CPTII,S$GLB,, | Performed by: EMERGENCY MEDICINE

## 2021-11-11 PROCEDURE — U0002: ICD-10-PCS | Mod: QW,S$GLB,, | Performed by: EMERGENCY MEDICINE

## 2021-11-11 PROCEDURE — 99203 OFFICE O/P NEW LOW 30 MIN: CPT | Mod: S$GLB,,, | Performed by: EMERGENCY MEDICINE

## 2021-11-11 RX ORDER — PROMETHAZINE HYDROCHLORIDE 25 MG/1
25 TABLET ORAL EVERY 4 HOURS
Qty: 20 TABLET | Refills: 0 | Status: SHIPPED | OUTPATIENT
Start: 2021-11-11 | End: 2021-11-15

## 2021-11-15 ENCOUNTER — OFFICE VISIT (OUTPATIENT)
Dept: RHEUMATOLOGY | Facility: CLINIC | Age: 41
End: 2021-11-15
Payer: COMMERCIAL

## 2021-11-15 VITALS — WEIGHT: 293 LBS | BODY MASS INDEX: 57.74 KG/M2

## 2021-11-15 DIAGNOSIS — R11.0 NAUSEA: ICD-10-CM

## 2021-11-15 DIAGNOSIS — M31.4 TAKAYASU'S ARTERITIS: Primary | ICD-10-CM

## 2021-11-15 PROCEDURE — 3008F BODY MASS INDEX DOCD: CPT | Mod: CPTII,S$GLB,, | Performed by: INTERNAL MEDICINE

## 2021-11-15 PROCEDURE — 1111F PR DISCHARGE MEDS RECONCILED W/ CURRENT OUTPATIENT MED LIST: ICD-10-PCS | Mod: CPTII,S$GLB,, | Performed by: INTERNAL MEDICINE

## 2021-11-15 PROCEDURE — 99214 OFFICE O/P EST MOD 30 MIN: CPT | Mod: S$GLB,,, | Performed by: INTERNAL MEDICINE

## 2021-11-15 PROCEDURE — 1111F DSCHRG MED/CURRENT MED MERGE: CPT | Mod: CPTII,S$GLB,, | Performed by: INTERNAL MEDICINE

## 2021-11-15 PROCEDURE — 99214 PR OFFICE/OUTPT VISIT, EST, LEVL IV, 30-39 MIN: ICD-10-PCS | Mod: S$GLB,,, | Performed by: INTERNAL MEDICINE

## 2021-11-15 PROCEDURE — 99999 PR PBB SHADOW E&M-EST. PATIENT-LVL III: CPT | Mod: PBBFAC,,, | Performed by: INTERNAL MEDICINE

## 2021-11-15 PROCEDURE — 3044F HG A1C LEVEL LT 7.0%: CPT | Mod: CPTII,S$GLB,, | Performed by: INTERNAL MEDICINE

## 2021-11-15 PROCEDURE — 1159F PR MEDICATION LIST DOCUMENTED IN MEDICAL RECORD: ICD-10-PCS | Mod: CPTII,S$GLB,, | Performed by: INTERNAL MEDICINE

## 2021-11-15 PROCEDURE — 99999 PR PBB SHADOW E&M-EST. PATIENT-LVL III: ICD-10-PCS | Mod: PBBFAC,,, | Performed by: INTERNAL MEDICINE

## 2021-11-15 PROCEDURE — 3044F PR MOST RECENT HEMOGLOBIN A1C LEVEL <7.0%: ICD-10-PCS | Mod: CPTII,S$GLB,, | Performed by: INTERNAL MEDICINE

## 2021-11-15 PROCEDURE — 3008F PR BODY MASS INDEX (BMI) DOCUMENTED: ICD-10-PCS | Mod: CPTII,S$GLB,, | Performed by: INTERNAL MEDICINE

## 2021-11-15 PROCEDURE — 1159F MED LIST DOCD IN RCRD: CPT | Mod: CPTII,S$GLB,, | Performed by: INTERNAL MEDICINE

## 2021-11-15 RX ORDER — METHOTREXATE 2.5 MG/1
TABLET ORAL
Qty: 45 TABLET | Refills: 3 | Status: SHIPPED | OUTPATIENT
Start: 2021-11-15 | End: 2021-11-16

## 2021-11-15 RX ORDER — PROMETHAZINE HYDROCHLORIDE 25 MG/1
25 TABLET ORAL EVERY 4 HOURS
Qty: 60 TABLET | Refills: 0 | Status: SHIPPED | OUTPATIENT
Start: 2021-11-15 | End: 2021-12-18

## 2021-11-15 RX ORDER — FOLIC ACID 1 MG/1
1 TABLET ORAL DAILY
Qty: 30 TABLET | Refills: 4 | Status: SHIPPED | OUTPATIENT
Start: 2021-11-15 | End: 2021-12-22

## 2021-11-15 RX ORDER — SULFAMETHOXAZOLE AND TRIMETHOPRIM 800; 160 MG/1; MG/1
TABLET ORAL
Qty: 12 TABLET | Refills: 1 | Status: SHIPPED | OUTPATIENT
Start: 2021-11-15 | End: 2021-12-22

## 2021-11-16 ENCOUNTER — PATIENT MESSAGE (OUTPATIENT)
Dept: RHEUMATOLOGY | Facility: CLINIC | Age: 41
End: 2021-11-16
Payer: COMMERCIAL

## 2021-11-16 RX ORDER — METHOTREXATE 2.5 MG/1
TABLET ORAL
Qty: 45 TABLET | Refills: 3 | Status: SHIPPED | OUTPATIENT
Start: 2021-11-16 | End: 2021-12-22

## 2021-11-18 ENCOUNTER — PATIENT MESSAGE (OUTPATIENT)
Dept: RHEUMATOLOGY | Facility: CLINIC | Age: 41
End: 2021-11-18
Payer: COMMERCIAL

## 2021-11-23 ENCOUNTER — PATIENT MESSAGE (OUTPATIENT)
Dept: RHEUMATOLOGY | Facility: CLINIC | Age: 41
End: 2021-11-23
Payer: COMMERCIAL

## 2021-11-24 RX ORDER — GABAPENTIN 100 MG/1
100 CAPSULE ORAL 3 TIMES DAILY
Qty: 90 CAPSULE | Refills: 3 | Status: SHIPPED | OUTPATIENT
Start: 2021-11-24 | End: 2021-12-22

## 2021-12-03 ENCOUNTER — OFFICE VISIT (OUTPATIENT)
Dept: FAMILY MEDICINE | Facility: CLINIC | Age: 41
End: 2021-12-03
Payer: COMMERCIAL

## 2021-12-03 VITALS
BODY MASS INDEX: 48.82 KG/M2 | TEMPERATURE: 99 F | HEIGHT: 65 IN | WEIGHT: 293 LBS | DIASTOLIC BLOOD PRESSURE: 82 MMHG | SYSTOLIC BLOOD PRESSURE: 154 MMHG | OXYGEN SATURATION: 95 % | HEART RATE: 129 BPM

## 2021-12-03 DIAGNOSIS — E66.01 MORBID OBESITY: ICD-10-CM

## 2021-12-03 DIAGNOSIS — Z72.0 TOBACCO ABUSE: ICD-10-CM

## 2021-12-03 DIAGNOSIS — E66.01 SEVERE OBESITY (BMI >= 40): ICD-10-CM

## 2021-12-03 DIAGNOSIS — Z12.31 SCREENING MAMMOGRAM FOR BREAST CANCER: ICD-10-CM

## 2021-12-03 DIAGNOSIS — Z00.00 WELL ADULT EXAM: Primary | ICD-10-CM

## 2021-12-03 DIAGNOSIS — J01.00 ACUTE NON-RECURRENT MAXILLARY SINUSITIS: ICD-10-CM

## 2021-12-03 DIAGNOSIS — J06.9 UPPER RESPIRATORY TRACT INFECTION, UNSPECIFIED TYPE: ICD-10-CM

## 2021-12-03 DIAGNOSIS — M31.4 ARTERITIS, TAKAYASU: ICD-10-CM

## 2021-12-03 LAB
CTP QC/QA: YES
INFLUENZA A, MOLECULAR: NEGATIVE
INFLUENZA B, MOLECULAR: NEGATIVE
SARS-COV-2 RDRP RESP QL NAA+PROBE: NEGATIVE
SPECIMEN SOURCE: NORMAL

## 2021-12-03 PROCEDURE — 99999 PR PBB SHADOW E&M-EST. PATIENT-LVL V: CPT | Mod: PBBFAC,,, | Performed by: FAMILY MEDICINE

## 2021-12-03 PROCEDURE — 99386 PR PREVENTIVE VISIT,NEW,40-64: ICD-10-PCS | Mod: S$GLB,,, | Performed by: FAMILY MEDICINE

## 2021-12-03 PROCEDURE — U0002: ICD-10-PCS | Mod: QW,S$GLB,, | Performed by: FAMILY MEDICINE

## 2021-12-03 PROCEDURE — 99999 PR PBB SHADOW E&M-EST. PATIENT-LVL V: ICD-10-PCS | Mod: PBBFAC,,, | Performed by: FAMILY MEDICINE

## 2021-12-03 PROCEDURE — U0002 COVID-19 LAB TEST NON-CDC: HCPCS | Mod: QW,S$GLB,, | Performed by: FAMILY MEDICINE

## 2021-12-03 PROCEDURE — 87502 INFLUENZA DNA AMP PROBE: CPT | Mod: PO | Performed by: FAMILY MEDICINE

## 2021-12-03 PROCEDURE — 99386 PREV VISIT NEW AGE 40-64: CPT | Mod: S$GLB,,, | Performed by: FAMILY MEDICINE

## 2021-12-03 RX ORDER — AMOXICILLIN AND CLAVULANATE POTASSIUM 875; 125 MG/1; MG/1
1 TABLET, FILM COATED ORAL EVERY 12 HOURS
Qty: 20 TABLET | Refills: 0 | Status: SHIPPED | OUTPATIENT
Start: 2021-12-03 | End: 2021-12-13

## 2021-12-06 ENCOUNTER — PATIENT MESSAGE (OUTPATIENT)
Dept: RHEUMATOLOGY | Facility: CLINIC | Age: 41
End: 2021-12-06
Payer: COMMERCIAL

## 2021-12-12 ENCOUNTER — NURSE TRIAGE (OUTPATIENT)
Dept: ADMINISTRATIVE | Facility: CLINIC | Age: 41
End: 2021-12-12
Payer: COMMERCIAL

## 2021-12-13 ENCOUNTER — PATIENT MESSAGE (OUTPATIENT)
Dept: RHEUMATOLOGY | Facility: CLINIC | Age: 41
End: 2021-12-13
Payer: COMMERCIAL

## 2021-12-15 ENCOUNTER — TELEPHONE (OUTPATIENT)
Dept: RHEUMATOLOGY | Facility: CLINIC | Age: 41
End: 2021-12-15
Payer: COMMERCIAL

## 2021-12-16 ENCOUNTER — HOSPITAL ENCOUNTER (OUTPATIENT)
Dept: RADIOLOGY | Facility: HOSPITAL | Age: 41
Discharge: HOME | End: 2021-12-16
Attending: FAMILY MEDICINE
Payer: COMMERCIAL

## 2021-12-16 VITALS — BODY MASS INDEX: 50.02 KG/M2 | HEIGHT: 64 IN | WEIGHT: 293 LBS

## 2021-12-16 DIAGNOSIS — Z12.31 SCREENING MAMMOGRAM FOR BREAST CANCER: ICD-10-CM

## 2021-12-16 PROCEDURE — 77063 MAMMO DIGITAL SCREENING BILAT WITH TOMO: ICD-10-PCS | Mod: 26,,, | Performed by: RADIOLOGY

## 2021-12-16 PROCEDURE — 77067 SCR MAMMO BI INCL CAD: CPT | Mod: TC,PO

## 2021-12-16 PROCEDURE — 77067 SCR MAMMO BI INCL CAD: CPT | Mod: 26,,, | Performed by: RADIOLOGY

## 2021-12-16 PROCEDURE — 77063 BREAST TOMOSYNTHESIS BI: CPT | Mod: 26,,, | Performed by: RADIOLOGY

## 2021-12-16 PROCEDURE — 77067 MAMMO DIGITAL SCREENING BILAT WITH TOMO: ICD-10-PCS | Mod: 26,,, | Performed by: RADIOLOGY

## 2021-12-17 ENCOUNTER — TELEPHONE (OUTPATIENT)
Dept: VASCULAR SURGERY | Facility: CLINIC | Age: 41
End: 2021-12-17
Payer: COMMERCIAL

## 2021-12-22 ENCOUNTER — PATIENT MESSAGE (OUTPATIENT)
Dept: RHEUMATOLOGY | Facility: CLINIC | Age: 41
End: 2021-12-22

## 2021-12-22 ENCOUNTER — OFFICE VISIT (OUTPATIENT)
Dept: RHEUMATOLOGY | Facility: CLINIC | Age: 41
End: 2021-12-22
Payer: COMMERCIAL

## 2021-12-22 DIAGNOSIS — R20.2 PARESTHESIAS: ICD-10-CM

## 2021-12-22 DIAGNOSIS — G47.30 SLEEP APNEA, UNSPECIFIED TYPE: Primary | ICD-10-CM

## 2021-12-22 DIAGNOSIS — M31.4 TAKAYASU'S ARTERITIS: ICD-10-CM

## 2021-12-22 PROCEDURE — 99214 OFFICE O/P EST MOD 30 MIN: CPT | Mod: 95,,, | Performed by: INTERNAL MEDICINE

## 2021-12-22 PROCEDURE — 99214 PR OFFICE/OUTPT VISIT, EST, LEVL IV, 30-39 MIN: ICD-10-PCS | Mod: 95,,, | Performed by: INTERNAL MEDICINE

## 2021-12-22 PROCEDURE — 3044F PR MOST RECENT HEMOGLOBIN A1C LEVEL <7.0%: ICD-10-PCS | Mod: CPTII,95,, | Performed by: INTERNAL MEDICINE

## 2021-12-22 PROCEDURE — 3044F HG A1C LEVEL LT 7.0%: CPT | Mod: CPTII,95,, | Performed by: INTERNAL MEDICINE

## 2021-12-22 RX ORDER — FOLIC ACID 1 MG/1
1 TABLET ORAL 3 TIMES DAILY
Qty: 90 TABLET | Refills: 4 | Status: SHIPPED | OUTPATIENT
Start: 2021-12-22 | End: 2022-02-15

## 2021-12-22 RX ORDER — METHOTREXATE 2.5 MG/1
TABLET ORAL
Qty: 45 TABLET | Refills: 3 | Status: SHIPPED | OUTPATIENT
Start: 2021-12-22 | End: 2022-02-15

## 2021-12-22 RX ORDER — DULOXETIN HYDROCHLORIDE 30 MG/1
30 CAPSULE, DELAYED RELEASE ORAL DAILY
Qty: 30 CAPSULE | Refills: 3 | Status: SHIPPED | OUTPATIENT
Start: 2021-12-22 | End: 2022-03-22

## 2021-12-22 RX ORDER — FOLIC ACID 1 MG/1
1 TABLET ORAL DAILY
Qty: 30 TABLET | Refills: 4 | Status: SHIPPED | OUTPATIENT
Start: 2021-12-22 | End: 2021-12-22

## 2021-12-22 RX ORDER — ADALIMUMAB 40MG/0.8ML
40 KIT SUBCUTANEOUS
Qty: 2 PEN | Refills: 11 | Status: SHIPPED | OUTPATIENT
Start: 2021-12-22 | End: 2023-01-17 | Stop reason: SDUPTHER

## 2021-12-23 DIAGNOSIS — M31.4 ARTERITIS, TAKAYASU: Primary | ICD-10-CM

## 2021-12-29 ENCOUNTER — HOSPITAL ENCOUNTER (OUTPATIENT)
Dept: VASCULAR SURGERY | Facility: CLINIC | Age: 41
Discharge: HOME | End: 2021-12-29
Attending: SURGERY
Payer: COMMERCIAL

## 2021-12-29 DIAGNOSIS — I73.9 PVD (PERIPHERAL VASCULAR DISEASE): ICD-10-CM

## 2021-12-29 DIAGNOSIS — M31.4 ARTERITIS, TAKAYASU: ICD-10-CM

## 2021-12-29 DIAGNOSIS — I77.1 SUBCLAVIAN ARTERIAL STENOSIS: ICD-10-CM

## 2021-12-29 PROCEDURE — 93923 PR NON-INVASIVE PHYSIOLOGIC STUDY EXTREMITY 3 LEVELS: ICD-10-PCS | Mod: S$GLB,,, | Performed by: SURGERY

## 2021-12-29 PROCEDURE — 93930 PR DUPLEX UP EXTREM ART BILAT: ICD-10-PCS | Mod: S$GLB,,, | Performed by: SURGERY

## 2021-12-29 PROCEDURE — 93930 UPPER EXTREMITY STUDY: CPT | Mod: S$GLB,,, | Performed by: SURGERY

## 2021-12-29 PROCEDURE — 93923 UPR/LXTR ART STDY 3+ LVLS: CPT | Mod: S$GLB,,, | Performed by: SURGERY

## 2021-12-29 PROCEDURE — 93880 PR DUPLEX SCAN EXTRACRANIAL,BILAT: ICD-10-PCS | Mod: S$GLB,,, | Performed by: SURGERY

## 2021-12-29 PROCEDURE — 93880 EXTRACRANIAL BILAT STUDY: CPT | Mod: S$GLB,,, | Performed by: SURGERY

## 2022-01-12 ENCOUNTER — TELEPHONE (OUTPATIENT)
Dept: SLEEP MEDICINE | Facility: CLINIC | Age: 42
End: 2022-01-12
Payer: COMMERCIAL

## 2022-01-13 ENCOUNTER — SPECIALTY PHARMACY (OUTPATIENT)
Dept: PHARMACY | Facility: CLINIC | Age: 42
End: 2022-01-13
Payer: COMMERCIAL

## 2022-01-14 NOTE — TELEPHONE ENCOUNTER
Dona CIFUENTES done via phone with rep Garcia, decision will be sent via fax.   Reference number:  [id number]

## 2022-01-19 NOTE — TELEPHONE ENCOUNTER
MD would like to appeal. Working on appeal     Humira PA denied. Sent inbasket to [facility] for next steps

## 2022-01-20 ENCOUNTER — OFFICE VISIT (OUTPATIENT)
Dept: FAMILY MEDICINE | Facility: CLINIC | Age: 42
End: 2022-01-20
Payer: COMMERCIAL

## 2022-01-20 VITALS
BODY MASS INDEX: 50.02 KG/M2 | HEART RATE: 115 BPM | HEIGHT: 64 IN | OXYGEN SATURATION: 96 % | SYSTOLIC BLOOD PRESSURE: 110 MMHG | DIASTOLIC BLOOD PRESSURE: 62 MMHG | WEIGHT: 293 LBS

## 2022-01-20 DIAGNOSIS — M31.4 ARTERITIS, TAKAYASU: ICD-10-CM

## 2022-01-20 DIAGNOSIS — J02.9 SORE THROAT: Primary | ICD-10-CM

## 2022-01-20 DIAGNOSIS — E66.01 SEVERE OBESITY (BMI >= 40): ICD-10-CM

## 2022-01-20 LAB
CTP QC/QA: YES
SARS-COV-2 RDRP RESP QL NAA+PROBE: NEGATIVE

## 2022-01-20 PROCEDURE — U0002: ICD-10-PCS | Mod: QW,S$GLB,, | Performed by: FAMILY MEDICINE

## 2022-01-20 PROCEDURE — 99999 PR PBB SHADOW E&M-EST. PATIENT-LVL IV: ICD-10-PCS | Mod: PBBFAC,,, | Performed by: FAMILY MEDICINE

## 2022-01-20 PROCEDURE — 99213 PR OFFICE/OUTPT VISIT, EST, LEVL III, 20-29 MIN: ICD-10-PCS | Mod: S$GLB,,, | Performed by: FAMILY MEDICINE

## 2022-01-20 PROCEDURE — 99213 OFFICE O/P EST LOW 20 MIN: CPT | Mod: S$GLB,,, | Performed by: FAMILY MEDICINE

## 2022-01-20 PROCEDURE — 1160F RVW MEDS BY RX/DR IN RCRD: CPT | Mod: CPTII,S$GLB,, | Performed by: FAMILY MEDICINE

## 2022-01-20 PROCEDURE — U0002 COVID-19 LAB TEST NON-CDC: HCPCS | Mod: QW,S$GLB,, | Performed by: FAMILY MEDICINE

## 2022-01-20 PROCEDURE — 3074F PR MOST RECENT SYSTOLIC BLOOD PRESSURE < 130 MM HG: ICD-10-PCS | Mod: CPTII,S$GLB,, | Performed by: FAMILY MEDICINE

## 2022-01-20 PROCEDURE — 1159F MED LIST DOCD IN RCRD: CPT | Mod: CPTII,S$GLB,, | Performed by: FAMILY MEDICINE

## 2022-01-20 PROCEDURE — 3074F SYST BP LT 130 MM HG: CPT | Mod: CPTII,S$GLB,, | Performed by: FAMILY MEDICINE

## 2022-01-20 PROCEDURE — 3078F DIAST BP <80 MM HG: CPT | Mod: CPTII,S$GLB,, | Performed by: FAMILY MEDICINE

## 2022-01-20 PROCEDURE — 1160F PR REVIEW ALL MEDS BY PRESCRIBER/CLIN PHARMACIST DOCUMENTED: ICD-10-PCS | Mod: CPTII,S$GLB,, | Performed by: FAMILY MEDICINE

## 2022-01-20 PROCEDURE — 1159F PR MEDICATION LIST DOCUMENTED IN MEDICAL RECORD: ICD-10-PCS | Mod: CPTII,S$GLB,, | Performed by: FAMILY MEDICINE

## 2022-01-20 PROCEDURE — 3008F BODY MASS INDEX DOCD: CPT | Mod: CPTII,S$GLB,, | Performed by: FAMILY MEDICINE

## 2022-01-20 PROCEDURE — 3008F PR BODY MASS INDEX (BMI) DOCUMENTED: ICD-10-PCS | Mod: CPTII,S$GLB,, | Performed by: FAMILY MEDICINE

## 2022-01-20 PROCEDURE — 3078F PR MOST RECENT DIASTOLIC BLOOD PRESSURE < 80 MM HG: ICD-10-PCS | Mod: CPTII,S$GLB,, | Performed by: FAMILY MEDICINE

## 2022-01-20 PROCEDURE — 99999 PR PBB SHADOW E&M-EST. PATIENT-LVL IV: CPT | Mod: PBBFAC,,, | Performed by: FAMILY MEDICINE

## 2022-01-20 NOTE — PROGRESS NOTES
"Subjective:       Patient ID: Kelly Dove is a 41 y.o. female.    Chief Complaint: Headache and Sore Throat    Here today to f/u.  In the interim, she has developed congestion, sore throat and headache in the last 24 hours. She states multiple co-workers have COVID.  She is vaccinated and boosted.  She has lessened up her smoking some.  She is working to connect with smoking cessation program.    Headache   Associated symptoms include a sore throat. Pertinent negatives include no abdominal pain, coughing, dizziness, fever or nausea.   Sore Throat   Associated symptoms include congestion and headaches. Pertinent negatives include no abdominal pain, coughing, diarrhea or shortness of breath.     Review of Systems   Constitutional: Negative for activity change, appetite change and fever.   HENT: Positive for congestion and sore throat.    Respiratory: Negative for cough, shortness of breath and wheezing.    Cardiovascular: Negative for chest pain and palpitations.   Gastrointestinal: Negative for abdominal pain, constipation, diarrhea and nausea.   Skin: Negative for pallor and rash.   Neurological: Positive for headaches. Negative for dizziness, syncope and light-headedness.       Objective:      Vitals:    01/20/22 1450   BP: 110/62   BP Location: Right arm   Patient Position: Sitting   BP Method: Large (Manual)   Pulse: (!) 115   SpO2: 96%   Weight: (!) 161.5 kg (356 lb)   Height: 5' 4" (1.626 m)      Physical Exam  Constitutional:       General: She is not in acute distress.     Appearance: She is obese.   Cardiovascular:      Rate and Rhythm: Normal rate and regular rhythm.      Heart sounds: Normal heart sounds. No murmur heard.      Pulmonary:      Effort: Pulmonary effort is normal. No respiratory distress.      Breath sounds: Normal breath sounds. No wheezing, rhonchi or rales.   Neurological:      General: No focal deficit present.      Mental Status: She is alert.   Psychiatric:         Mood and Affect: " Mood normal.         Behavior: Behavior normal.         Thought Content: Thought content normal.       Rapid COVID : Neg  Assessment:       1. Sore throat    2. Arteritis, Takayasu    3. Severe obesity (BMI >= 40)        Plan:       Sore throat  -     POCT COVID-19 Rapid Screening    Arteritis, Takayasu    Severe obesity (BMI >= 40)    She is vaccinated, we discussed her self-isolating for 5 days due to her URI.  She will go back to work on Monday.  Use Mucinex and Flonase for symptoms.  BP is well controlled.  Stop smoking.      Medication List with Changes/Refills   Current Medications    ACETAMINOPHEN (TYLENOL) 500 MG TABLET    Take 2 tablets (1,000 mg total) by mouth every 8 (eight) hours as needed for Pain.    ADALIMUMAB (HUMIRA PEN) PNKT INJECTION    Inject 1 pen (40 mg total) into the skin every 14 (fourteen) days.    CALCIUM CARBONATE (TUMS) 200 MG CALCIUM (500 MG) CHEWABLE TABLET    Take 2 tablets (1,000 mg total) by mouth once daily.    DULOXETINE (CYMBALTA) 30 MG CAPSULE    Take 1 capsule (30 mg total) by mouth once daily.    FOLIC ACID (FOLVITE) 1 MG TABLET    Take 1 tablet (1 mg total) by mouth 3 (three) times daily.    METHOTREXATE 2.5 MG TAB    9 pills weekly    ONDANSETRON (ZOFRAN) 4 MG TABLET    1 to 2 tablets a day for nausea    PANTOPRAZOLE (PROTONIX) 40 MG TABLET    Take 1 tablet (40 mg total) by mouth once daily.    PNEUMOC 13-MADHURI CONJ-DIP CR,PF, (PREVNAR 13, PF,) 0.5 ML SYRG    PCV 13 first and PPSV 23 after 8 weeks    PNEUMOCOCCAL VACCINE (PNU-IMMUNE 23) 25 MCG/0.5 ML INJECTION    PCV 13 first and PPSV 23 after 8 weeks    PROMETHAZINE (PHENERGAN) 25 MG TABLET    TAKE 1 TABLET(25 MG) BY MOUTH EVERY 4 HOURS    VITAMIN D (VITAMIN D3) 1000 UNITS TAB    Take 1 tablet (1,000 Units total) by mouth once daily.

## 2022-01-24 ENCOUNTER — PATIENT MESSAGE (OUTPATIENT)
Dept: RHEUMATOLOGY | Facility: CLINIC | Age: 42
End: 2022-01-24
Payer: COMMERCIAL

## 2022-02-01 ENCOUNTER — TELEPHONE (OUTPATIENT)
Dept: VASCULAR SURGERY | Facility: CLINIC | Age: 42
End: 2022-02-01
Payer: COMMERCIAL

## 2022-02-01 ENCOUNTER — PATIENT MESSAGE (OUTPATIENT)
Dept: FAMILY MEDICINE | Facility: CLINIC | Age: 42
End: 2022-02-01
Payer: COMMERCIAL

## 2022-02-01 ENCOUNTER — PATIENT MESSAGE (OUTPATIENT)
Dept: RHEUMATOLOGY | Facility: CLINIC | Age: 42
End: 2022-02-01
Payer: COMMERCIAL

## 2022-02-01 NOTE — TELEPHONE ENCOUNTER
Pt called to reschedule appt due to testing positive for Covid today. Pt informed that she will be  Rescheduled next available.Pt verbalized understanding of information received.

## 2022-02-02 NOTE — TELEPHONE ENCOUNTER
Checked status of appeal updated on 1/22 still in pending status per rep Robin, can take up to 7-14 days for a decision [id number]

## 2022-02-04 ENCOUNTER — PATIENT MESSAGE (OUTPATIENT)
Dept: FAMILY MEDICINE | Facility: CLINIC | Age: 42
End: 2022-02-04
Payer: COMMERCIAL

## 2022-02-10 ENCOUNTER — LAB VISIT (OUTPATIENT)
Dept: LAB | Facility: HOSPITAL | Age: 42
End: 2022-02-10
Attending: INTERNAL MEDICINE
Payer: COMMERCIAL

## 2022-02-10 DIAGNOSIS — M31.4 TAKAYASU'S ARTERITIS: ICD-10-CM

## 2022-02-10 LAB
ALBUMIN SERPL BCP-MCNC: 3.9 G/DL (ref 3.5–5.2)
ALP SERPL-CCNC: 73 U/L (ref 55–135)
ALT SERPL W/O P-5'-P-CCNC: 54 U/L (ref 10–44)
ANION GAP SERPL CALC-SCNC: 12 MMOL/L (ref 8–16)
AST SERPL-CCNC: 31 U/L (ref 10–40)
BASOPHILS # BLD AUTO: 0.04 K/UL (ref 0–0.2)
BASOPHILS NFR BLD: 0.6 % (ref 0–1.9)
BILIRUB SERPL-MCNC: 0.5 MG/DL (ref 0.1–1)
BUN SERPL-MCNC: 8 MG/DL (ref 6–20)
CALCIUM SERPL-MCNC: 9.4 MG/DL (ref 8.7–10.5)
CHLORIDE SERPL-SCNC: 105 MMOL/L (ref 95–110)
CO2 SERPL-SCNC: 25 MMOL/L (ref 23–29)
CREAT SERPL-MCNC: 0.6 MG/DL (ref 0.5–1.4)
CRP SERPL-MCNC: 13.3 MG/L (ref 0–8.2)
DIFFERENTIAL METHOD: ABNORMAL
EOSINOPHIL # BLD AUTO: 0.2 K/UL (ref 0–0.5)
EOSINOPHIL NFR BLD: 3.5 % (ref 0–8)
ERYTHROCYTE [DISTWIDTH] IN BLOOD BY AUTOMATED COUNT: 14.4 % (ref 11.5–14.5)
ERYTHROCYTE [SEDIMENTATION RATE] IN BLOOD BY WESTERGREN METHOD: 7 MM/HR (ref 0–36)
EST. GFR  (AFRICAN AMERICAN): >60 ML/MIN/1.73 M^2
EST. GFR  (NON AFRICAN AMERICAN): >60 ML/MIN/1.73 M^2
GLUCOSE SERPL-MCNC: 76 MG/DL (ref 70–110)
HCT VFR BLD AUTO: 44.1 % (ref 37–48.5)
HGB BLD-MCNC: 13.7 G/DL (ref 12–16)
IMM GRANULOCYTES # BLD AUTO: 0.04 K/UL (ref 0–0.04)
IMM GRANULOCYTES NFR BLD AUTO: 0.6 % (ref 0–0.5)
LYMPHOCYTES # BLD AUTO: 1.6 K/UL (ref 1–4.8)
LYMPHOCYTES NFR BLD: 23.5 % (ref 18–48)
MCH RBC QN AUTO: 29.6 PG (ref 27–31)
MCHC RBC AUTO-ENTMCNC: 31.1 G/DL (ref 32–36)
MCV RBC AUTO: 95 FL (ref 82–98)
MONOCYTES # BLD AUTO: 0.6 K/UL (ref 0.3–1)
MONOCYTES NFR BLD: 8.6 % (ref 4–15)
NEUTROPHILS # BLD AUTO: 4.2 K/UL (ref 1.8–7.7)
NEUTROPHILS NFR BLD: 63.2 % (ref 38–73)
NRBC BLD-RTO: 0 /100 WBC
PLATELET # BLD AUTO: 235 K/UL (ref 150–450)
PMV BLD AUTO: 10.5 FL (ref 9.2–12.9)
POTASSIUM SERPL-SCNC: 4.3 MMOL/L (ref 3.5–5.1)
PROT SERPL-MCNC: 7.1 G/DL (ref 6–8.4)
RBC # BLD AUTO: 4.63 M/UL (ref 4–5.4)
SODIUM SERPL-SCNC: 142 MMOL/L (ref 136–145)
WBC # BLD AUTO: 6.64 K/UL (ref 3.9–12.7)

## 2022-02-10 PROCEDURE — 85652 RBC SED RATE AUTOMATED: CPT | Performed by: INTERNAL MEDICINE

## 2022-02-10 PROCEDURE — 85025 COMPLETE CBC W/AUTO DIFF WBC: CPT | Performed by: INTERNAL MEDICINE

## 2022-02-10 PROCEDURE — 36415 COLL VENOUS BLD VENIPUNCTURE: CPT | Mod: PO | Performed by: INTERNAL MEDICINE

## 2022-02-10 PROCEDURE — 86140 C-REACTIVE PROTEIN: CPT | Performed by: INTERNAL MEDICINE

## 2022-02-10 PROCEDURE — 80053 COMPREHEN METABOLIC PANEL: CPT | Performed by: INTERNAL MEDICINE

## 2022-02-14 ENCOUNTER — PATIENT MESSAGE (OUTPATIENT)
Dept: RHEUMATOLOGY | Facility: CLINIC | Age: 42
End: 2022-02-14
Payer: COMMERCIAL

## 2022-02-14 ENCOUNTER — PATIENT OUTREACH (OUTPATIENT)
Dept: ADMINISTRATIVE | Facility: OTHER | Age: 42
End: 2022-02-14
Payer: COMMERCIAL

## 2022-02-14 NOTE — PROGRESS NOTES
Health Maintenance Due   Topic Date Due    Lipid Panel  Never done    Pneumococcal Vaccines (Age 0-64) (1 of 2 - PPSV23) Never done    TETANUS VACCINE  Never done     Updates were requested from care everywhere.  Chart was reviewed for overdue Proactive [facility] Encounters ([facility]OU[facility]) topics (CRS, Breast Cancer Screening, Eye exam)  Health Maintenance has been updated.  LINKS immunization registry triggered.  Immunizations were reconciled.

## 2022-02-15 ENCOUNTER — PATIENT MESSAGE (OUTPATIENT)
Dept: RHEUMATOLOGY | Facility: CLINIC | Age: 42
End: 2022-02-15

## 2022-02-15 ENCOUNTER — OFFICE VISIT (OUTPATIENT)
Dept: SLEEP MEDICINE | Facility: CLINIC | Age: 42
End: 2022-02-15
Payer: COMMERCIAL

## 2022-02-15 ENCOUNTER — OFFICE VISIT (OUTPATIENT)
Dept: RHEUMATOLOGY | Facility: CLINIC | Age: 42
End: 2022-02-15
Payer: COMMERCIAL

## 2022-02-15 ENCOUNTER — DOCUMENTATION ONLY (OUTPATIENT)
Dept: RHEUMATOLOGY | Facility: CLINIC | Age: 42
End: 2022-02-15

## 2022-02-15 VITALS
SYSTOLIC BLOOD PRESSURE: 161 MMHG | BODY MASS INDEX: 62.86 KG/M2 | DIASTOLIC BLOOD PRESSURE: 104 MMHG | HEART RATE: 117 BPM | WEIGHT: 293 LBS

## 2022-02-15 VITALS — WEIGHT: 293 LBS | BODY MASS INDEX: 62.86 KG/M2

## 2022-02-15 DIAGNOSIS — F51.09 OTHER INSOMNIA NOT DUE TO A SUBSTANCE OR KNOWN PHYSIOLOGICAL CONDITION: Primary | ICD-10-CM

## 2022-02-15 DIAGNOSIS — M31.4 TAKAYASU'S ARTERITIS: Primary | ICD-10-CM

## 2022-02-15 DIAGNOSIS — R35.1 NOCTURIA: ICD-10-CM

## 2022-02-15 DIAGNOSIS — M31.4 ARTERITIS, TAKAYASU: Primary | ICD-10-CM

## 2022-02-15 DIAGNOSIS — R53.83 FATIGUE, UNSPECIFIED TYPE: ICD-10-CM

## 2022-02-15 PROCEDURE — 99204 OFFICE O/P NEW MOD 45 MIN: CPT | Mod: S$GLB,,, | Performed by: INTERNAL MEDICINE

## 2022-02-15 PROCEDURE — 3080F DIAST BP >= 90 MM HG: CPT | Mod: CPTII,S$GLB,, | Performed by: INTERNAL MEDICINE

## 2022-02-15 PROCEDURE — 3008F PR BODY MASS INDEX (BMI) DOCUMENTED: ICD-10-PCS | Mod: CPTII,S$GLB,, | Performed by: INTERNAL MEDICINE

## 2022-02-15 PROCEDURE — 3008F BODY MASS INDEX DOCD: CPT | Mod: CPTII,S$GLB,, | Performed by: INTERNAL MEDICINE

## 2022-02-15 PROCEDURE — 1159F PR MEDICATION LIST DOCUMENTED IN MEDICAL RECORD: ICD-10-PCS | Mod: CPTII,S$GLB,, | Performed by: INTERNAL MEDICINE

## 2022-02-15 PROCEDURE — 99999 PR PBB SHADOW E&M-EST. PATIENT-LVL III: ICD-10-PCS | Mod: PBBFAC,,, | Performed by: INTERNAL MEDICINE

## 2022-02-15 PROCEDURE — 99204 PR OFFICE/OUTPT VISIT, NEW, LEVL IV, 45-59 MIN: ICD-10-PCS | Mod: S$GLB,,, | Performed by: INTERNAL MEDICINE

## 2022-02-15 PROCEDURE — 99999 PR PBB SHADOW E&M-EST. PATIENT-LVL III: CPT | Mod: PBBFAC,,, | Performed by: INTERNAL MEDICINE

## 2022-02-15 PROCEDURE — 3077F PR MOST RECENT SYSTOLIC BLOOD PRESSURE >= 140 MM HG: ICD-10-PCS | Mod: CPTII,S$GLB,, | Performed by: INTERNAL MEDICINE

## 2022-02-15 PROCEDURE — 1159F MED LIST DOCD IN RCRD: CPT | Mod: CPTII,S$GLB,, | Performed by: INTERNAL MEDICINE

## 2022-02-15 PROCEDURE — 99214 PR OFFICE/OUTPT VISIT, EST, LEVL IV, 30-39 MIN: ICD-10-PCS | Mod: S$GLB,,, | Performed by: INTERNAL MEDICINE

## 2022-02-15 PROCEDURE — 3077F SYST BP >= 140 MM HG: CPT | Mod: CPTII,S$GLB,, | Performed by: INTERNAL MEDICINE

## 2022-02-15 PROCEDURE — 99214 OFFICE O/P EST MOD 30 MIN: CPT | Mod: S$GLB,,, | Performed by: INTERNAL MEDICINE

## 2022-02-15 PROCEDURE — 3080F PR MOST RECENT DIASTOLIC BLOOD PRESSURE >= 90 MM HG: ICD-10-PCS | Mod: CPTII,S$GLB,, | Performed by: INTERNAL MEDICINE

## 2022-02-15 RX ORDER — METHOTREXATE 25 MG/ML
INJECTION, SOLUTION INTRA-ARTERIAL; INTRAMUSCULAR; INTRAVENOUS
Qty: 10 ML | Refills: 1 | Status: SHIPPED | OUTPATIENT
Start: 2022-02-15 | End: 2022-06-16

## 2022-02-15 RX ORDER — FOLIC ACID 1 MG/1
1 TABLET ORAL 3 TIMES DAILY
Qty: 90 TABLET | Refills: 4 | Status: SHIPPED | OUTPATIENT
Start: 2022-02-15 | End: 2023-03-22

## 2022-02-15 NOTE — PROGRESS NOTES
Chief Complaint   Patient presents with    Disease Management           History of presenting illness    The chief complaint leading to consultation is: takayasu's arteritis      Notes:       2/2022    Whole body pain  covid feb 1rst- 9 days- sinus stuffiness, fatigue    2/10    CRP 13.3  ESR nml    We missed mtx for 10 days due to covid and the CRP might be high due to the covid and not being on mtx    On mtx 9 pills weekly  Folic acid daily    On prednisone 5 mg until she got covid  Now off it    BP today 161/104    Sleep study today  Neurology appointment pending    Many scans were done in 12/2021      VAS US arterial arms    Rt thumb doppler waveform - PPG:   minimally dampened   Rt index finger doppler waveform - PPG:    minimally dampened   Rt middle finger doppler waveform - PPG:   minimally dampened   Rt ring finger doppler waveform - PPG: minimally dampened   Rt little finger doppler waveform - PPG:   minimally dampened   Lt thumb doppler waveform - PPG:   Within normal limits   Lt index finger doppler waveform - PPG:    Within normal limits   Lt middle finger doppler waveform - PPG:   Within normal limits   Lt ring finger doppler waveform - PPG: Within normal limits   Lt little finger doppler waveform - PPG:   Within normal limits     VAS US carotids b/l    RIGHT SIDE:   Normal - No evidence of plaque in the right internal carotid artery.   Antegrade flow in the right vertebral artery.   The right Subclavian artery is not visualized, suggestive of possible vessel occlusion. The Axillary and prox Brachial arteries appear   patent without a significant stenosis.     LEFT SIDE:   Normal - No evidence of plaque in the left internal carotid artery.   Antegrade flow in the left vertebral artery.   The left Subclavian and Axillary arteries appear patent without a significant stenosis.       VAS US CARLOS    Right Leg: Segmental pressures may be unreliable due to suspected medial calcinosis; however, PVR waveforms  suggest no   evidence of significant peripheral arterial occlusive disease.   - Rt Great Toe: The PPG waveform as described above. - TBI of 1.2 with a toe pressure of 157 mmHg is noted.     Left Leg: Segmental pressures may be unreliable due to suspected medial calcinosis; however, PVR waveforms suggest minimal   femoral peripheral arterial occlusive disease.   - Lt Great Toe: The PPG waveform as described above. - TBI of 1.24 with a toe pressure of 162 mmHg is noted.     -Brachial pressures differ by greater than 20 mmHg.       12/2021    She has been calling me with the following complaints :     Left facial swelling  Left arm swelling  Left hand and wrist swelling  Left ankle swelling  She sleeps on both sides    The swelling is random    Left facial twitching   Numbness in the hands  Right toe tingles    On taper of prednisone,now on 10 mg-for 3 days now  Prior to that we did 40/30/20  Cramps in the legs    On mtx 7 tabs weekly,folic acid  Plan is to maximise mtx to 9 pills weekly /folic acid     She is very tired    ESR,CRP nml      41 year old white female presented with      Chest pains-mid September-went to the hospital  EKG abnormal-nonspecific ST segment in inferolateral leads  Admitted     Right subclavian artery occluded    Stress test-decreased LAD territory  ECHO : Gradient across aortic valve nml  EF nml  Grade 1 diastolic dysfunction    Heart fine,no blockages  Cholesterol normal  a1c normal  Triglycerides normal    Vascular surgeon -has evaluated her    ESR nml  CRP elevated 4.41    Blood cultures negative    CMP nml  CBC nml  TSH nml    Carotid artery -CTA-Right subclavian artery occluded   Aortic root shoot-showed occlusion of right subclavian artery after right coomon carotid artery origin    There is complete occlusion from the origin of the subclavian artery to the axilla     Symptoms     Fatigue  Night sweats  Feels feverish  Weight gain-40 pounds  Sob  Dizziness  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  Arms ache when she showers  When she gets up she becomes dizzy  Hands are hurting  Chronic headaches-now severe-not responding to ibuprofen    Infact she has 2 year h/o right hand numbness pain and swelling after combing hair and significant work using this hand       Right eye-blurry vision-waves bottom right field  No eye exam recently    Abdominal pain-new -with eating within 10 to 15 minutes-lower abdomen    She had abdominal infection s/p 3rd delivery  She had hysterectomy,saplingo-oophorectomy   3 hernia surgeries   Chronic diarrhea-with eating     Past history : none    Family history : cervical and ovarian cancer  Sister RA  Son JEFFREY  Son severe asthma,food allergies,eczema   Aunt caballero johnsons  Sister-spontaneous coronary dissection s/p delivery     Social history : current smoker,not an alcoholic        Review of Systems   Constitutional: Positive for fever. Negative for unexpected weight change.   HENT: Negative for mouth sores and trouble swallowing.    Eyes: Negative for redness.   Respiratory: Positive for shortness of breath. Negative for cough.    Cardiovascular: Positive for chest pain.   Gastrointestinal: Negative for constipation and diarrhea.   Genitourinary: Negative for dysuria and genital sores.   Skin: Negative for rash.   Neurological: Negative for headaches.   Hematological: Does not bruise/bleed easily.            No skin rashes,malar rash,photosensitivity   No telangiectasias   No calcinosis   No psoriasis   No patchy alopecia   No oral and nasal ulcers   No dry eyes and dry mouth   No dysphagia,diplopia and dysphonia and muscle weakness   No n/v/c   No acid reflux+   No raynaud's+   No digital ulcers   No cytopenias   No renal issues   No blood clots   No weight loss and loss of appetite   1 miscarriage-molar pregnancy  No recurrent conjunctivitis or uveitis or scleritis or episcleritis   No chronic or bloody diarrhea with no u colitis or crohn's /inflammatory bowel disease    No vaginal or  urethral  d/c/STDs/no ulcers   No unexplained lymphadenopathy,parotitis   No seizures,strokes,psychosis  No sclerodactyly  No puffy hands  No perioral tightness       Physical Exam   Constitutional: She is oriented to person, place, and time. No distress.   HENT:   Head: Normocephalic.   Mouth/Throat: Oropharynx is clear and moist.   Eyes: Pupils are equal, round, and reactive to light. Conjunctivae are normal. Right eye exhibits no discharge. Left eye exhibits no discharge. No scleral icterus.   Neck: No thyromegaly present.   Cardiovascular: Normal rate, regular rhythm and normal heart sounds.   Pulmonary/Chest: Effort normal and breath sounds normal. No stridor.   Abdominal: Soft. Bowel sounds are normal.   Musculoskeletal:         General: Normal range of motion.      Cervical back: Normal range of motion.   Lymphadenopathy:     She has no cervical adenopathy.   Neurological: She is alert and oriented to person, place, and time.   Skin: Skin is warm. No rash noted. She is not diaphoretic.   Psychiatric: Affect and judgment normal.         Laboratory abnormalities    nml CBC,CMP  ESR nml  CRP 9.1  UA no blood or protein  nml troponins  nml BNP  nml hb11c  nml CMP  nml mag and phos  Elevated IL-6  Pre dmard panel neg    Initial imaging studies     CTA chest noncoronary    No pulmonary thromboembolism.  There is high-grade stenosis of the right subclavian artery approximately 2.5 cm from its origin in this patient with reported history of the same.  Correlation is advised.  No acute cardiopulmonary process.  Possible hepatic steatosis, correlation with LFTs recommended.      CTA abdomen and pelvis  Partially calcified splenic artery aneurysm at the level of the hilum. Otherwise, the abdominopelvic arteries are patent and normal in caliber.       CTA head and neck  1. No acute intracranial findings.  2. CTA head and neck without large vessel occlusion or high-grade stenosis.  3. Additional findings  as above.    ECHO    · The left ventricle is normal in size with low normal systolic function. The estimated ejection fraction is 50%.  · Normal right ventricular size with normal right ventricular systolic function.  · Normal left ventricular diastolic function.  · Normal central venous pressure (3 mmHg).          Assessment     Patient is a 41 year old female with  [place]- ancestry-  Comes in with 2 year h/o right arm claudication  Right arm goes numb when she does too much!!  This has progressed over the past 2 years and she now has very limited use of her right arm/cannot brush her hair/cannot shower using this arm for overhead activities    In addition she is     Short of breath  She cannot walk short distances    Has right eye I/M loss of vision right lower field    Postprandial abdominal pain    Dizziness and orthostasis     Labs show elevated cardiac CRP 4.4  nml ESR  CTA-Complete occlusion of right subclavian artery    Other risk factors r./corin  Definitely long term smoking-can put her at high risk for vascular disease  But she doesn't seem to be a diabetic,hypertensive  HDL,LDL ok,triglycerides 210? 110? Discrepancy noted    I admitted her with the intent to get   rpt ESR,CRP  MRA-chest,abdomen,pelvis  Brain and neck  Eye exam  Vascular surgery to kindly look at her asap-assess need for revascularisation    Should we immunosuppress? Steroids +/- TNFs-depends on if there is activity as noted on MRI  Not much data on IL-6 inhibitors    GI -eval -is the postprandial pain from occlusion in an artery ? Or given the multiple surgeries are we dealing with some obstruction etc?    CT abdomen-pelvis in addition to MRA might help    It so happened that she had CTA chest and she has high grade stenosis of the R subclavian artery. Vascular surgery felt that the stenosis was chronic due to collaterals and signed off.   Optho also saw the patient and did not note any abnormalities on  exam.   CTA of abdomen/pelvis showed partially calcified splenic artery aneurysm at the level of the hilum.   CTA head/neck had no without large vessel occlusion or high-grade stenosis.    She was started on 40 mg prednisone with PJP prophylaxis on 10/30/2021    We saw her in the clinic-11/15/2021  We suggested steroid tapering the prednisone   We started mtx 5 pills weekly with a slow titration    She is now on mtx 9 pills weekly,folic acid  She is still waiting for approval on humira,we have appealed for the approval since it got denied    BP high  She now has abnormal CARLOS- did she have this all along and we didn't know since she has not had prior ABIs- cant tell    She just doesn't feel good  She hurts  She feels swollen  But no synovitis    Worry that sleep apnea and high blood pressures might be contributing to some of her symptoms    Had covid 10 days ago- high crp might be due to the same         1. Takayasu's arteritis                Crystal was seen today for disease management.    Diagnoses and all orders for this visit:    Takayasu's arteritis  -     CBC Auto Differential; Future  -     Comprehensive Metabolic Panel; Future  -     Sedimentation rate; Future  -     C-Reactive Protein; Future    Other orders  -     methotrexate 25 mg/mL injection; 25 mg,1 ml weekly  -     folic acid (FOLVITE) 1 MG tablet; Take 1 tablet (1 mg total) by mouth 3 (three) times daily.        Plan      I wrote a note to      I only knew of her right subclavian artery stenosis   But I see that there is a  VAS-US- CARLOS showing  Right Leg: Segmental pressures may be unreliable due to suspected medial calcinosis; however, PVR waveforms suggest no   evidence of significant peripheral arterial occlusive disease.   - Rt Great Toe: The PPG waveform as described above. - TBI of 1.2 with a toe pressure of 157 mmHg is noted.   Left Leg: Segmental pressures may be unreliable due to suspected medial calcinosis; however, PVR waveforms  suggest minimal   femoral peripheral arterial occlusive disease.   - Lt Great Toe: The PPG waveform as described above. - TBI of 1.24 with a toe pressure of 162 mmHg is noted.   -Brachial pressures differ by greater than 20 mmHg.   I dont see another CARLOS on the system to compare this to  There is however a CTA abdomen and pelvis when she was first seen by us and admitted   Based on the data we have is there anyway we can tell if the lower extremity lesions are a progression of the disease? Or this might have been there all along but we didn't know        Plan     mtx now on 9 pills weekly-change to 1 ml weekly  Folic acid -increase to 3 pills daily  Waiting for humira approval    When she gets humira- mtx + humira for some time   Once we know the disease is controlled,slowly wean off mtx     Sleep study-today    We stopped robaxin and gabapentin  On cymbalta 30 mg daily    BP management-requested PCP to help us    Neurology-ask them if we are missing any neurologic issues here- coming up soon    Vaccines  covid vaccine x 2  Booster needed  Flu shot  Pneumonia shots    Labs and rtc in 3 months

## 2022-02-15 NOTE — PROGRESS NOTES
"NEW PATIENT VISIT  Referred by Self, Regireferral     Kelly Dove  is a pleasant 42 y.o. female  with PMH significant for takayasu's arteritis, BMI>60 who presents for evaluation of sleep apnea. Patient states she feels "tired" all the time.    Prior sleep studies:  no    Insomnia?:  Sleep initiation and maintenance  Hypnotic use?: no    Bed partner:     Witnessed snoring ?:  no  Snoring arousals?: no   Witnessed apneas? no    H/H hallucinations?: no  Sleep paralysis?: no  Cataplexy?:  no    RLS?:   no    Sleepy if inactive?: yes  Sleepiness driving: no  ESS:   9      SLEEP SCHEDULE   Bed Time 9-11pm   Sleep Latency 30-60min   Arousals 3-4   Back to sleep 20-30 min   Wake time 6am   Naps 1-2x/wk due to schedule; 20min-2hours   Nocturia 2   Work        Vitals:    02/15/22 1115   Weight: (!) 166.1 kg (366 lb 2.9 oz)     Physical Exam:    GEN:   Well-appearing  Psych:  Appropriate affect, demonstrates insight  SKIN:  No rash on the face or bridge of the nose      LABS:   Lab Results   Component Value Date    HGB 13.7 02/10/2022    CO2 25 02/10/2022       RECORDS REVIEWED PREVIOUSLY:    No prior sleep testing.    ASSESSMENT    PROBLEM DESCRIPTION  STATUS   FUNMILAYO   No snoring, snoring arousals or witnessed apneas  Sleep is unrefreshing  HEENT: MP4,    New   Daytime Sx   + sleepiness when inactive (frequently takes naps)  denies sleepiness when driving   ESS 9/24 on intake  New   Nocturia x2 per night       Comorbidities: PVD, Takaysu's arteritis (humira), elevated     PLAN   -will proceed with sleep testing   -discussed trial of PAP therapy if FUNMILAYO present   -driving precautions were discussed with the patient    RTC          "

## 2022-02-21 NOTE — TELEPHONE ENCOUNTER
BI NOTES:    IN-NETWORK: [facility] is in network  OOP: N/A  DED: N/A  COPAY: 60  TIER: 4  REP: Anu    Test claim going through with copay of $5

## 2022-02-21 NOTE — TELEPHONE ENCOUNTER
Chano, this is Destinee Luna with [facility].  We are working on your prescription that your doctor has sent us. We will be working with your insurance to get this approved for you. We will be calling you along the way with updates on your medication.  If you have any questions, you can reach us at [phone].    Welcome call outcome: Left voicemail       LVM to alert patient PA for Humira. Copay of $5 with [employer]. In network. Sending to initial

## 2022-02-28 ENCOUNTER — SPECIALTY PHARMACY (OUTPATIENT)
Dept: PHARMACY | Facility: CLINIC | Age: 42
End: 2022-02-28
Payer: COMMERCIAL

## 2022-03-03 ENCOUNTER — TELEPHONE (OUTPATIENT)
Dept: SLEEP MEDICINE | Facility: OTHER | Age: 42
End: 2022-03-03
Payer: COMMERCIAL

## 2022-03-10 ENCOUNTER — PATIENT MESSAGE (OUTPATIENT)
Dept: SLEEP MEDICINE | Facility: OTHER | Age: 42
End: 2022-03-10
Payer: COMMERCIAL

## 2022-03-10 ENCOUNTER — TELEPHONE (OUTPATIENT)
Dept: SLEEP MEDICINE | Facility: OTHER | Age: 42
End: 2022-03-10
Payer: COMMERCIAL

## 2022-03-10 ENCOUNTER — PATIENT MESSAGE (OUTPATIENT)
Dept: RHEUMATOLOGY | Facility: CLINIC | Age: 42
End: 2022-03-10
Payer: COMMERCIAL

## 2022-03-11 ENCOUNTER — HOSPITAL ENCOUNTER (OUTPATIENT)
Dept: SLEEP MEDICINE | Facility: OTHER | Age: 42
Discharge: HOME | End: 2022-03-11
Attending: INTERNAL MEDICINE
Payer: COMMERCIAL

## 2022-03-11 DIAGNOSIS — F51.09 OTHER INSOMNIA NOT DUE TO A SUBSTANCE OR KNOWN PHYSIOLOGICAL CONDITION: ICD-10-CM

## 2022-03-11 DIAGNOSIS — R35.1 NOCTURIA: ICD-10-CM

## 2022-03-11 DIAGNOSIS — R53.83 FATIGUE, UNSPECIFIED TYPE: ICD-10-CM

## 2022-03-11 PROCEDURE — 95810 POLYSOM 6/> YRS 4/> PARAM: CPT

## 2022-03-11 PROCEDURE — 95810 POLYSOM 6/> YRS 4/> PARAM: CPT | Mod: 26,,, | Performed by: INTERNAL MEDICINE

## 2022-03-11 PROCEDURE — 95810 PR POLYSOMNOGRAPHY, 4 OR MORE: ICD-10-PCS | Mod: 26,,, | Performed by: INTERNAL MEDICINE

## 2022-03-12 NOTE — PROGRESS NOTES
Education was done via explanation of sleep study process and procedure. All questions were answered.    Pt. did not meet criteria for CPAP. Some respiratory events were observed. Short supine REM was obtaiined.      Low sat of 89% was observed in study. EKG revealed rare PVC. Soft to moderate snoring was heard. Thank you letter was given in a.m.

## 2022-03-18 NOTE — PROCEDURES
[facility]  Tel: [phone]    DIAGNOSTIC SLEEP STUDY     Patient Name: TALIA [facility] #: [id number]   Sex: Female Study Date: 3/11/2022   : 1980 Clinic #: [id number]   Age: 42 Referring Physician: Lalito Prieto M.D   Height: 64.0 in Referring Physician #    Weight: 366.0 lbs Sleep Specialist:    PREMMTaliaI.: 62.8 Sleep Specialist #    Hypopnea rule: AASM 1A Scoring Tech: FRANSISCO Pitt, RPSGT   Total AHI: 6.5 Recording Tech: YAHIR Hanna CRT, RPSGT   Lowest O2 sat: 86.0% Recording Location: [facility]     Sleep architecture: This is a baseline polysomnogram. At lights out, the patient fell asleep in 28.6 minutes and slept for 71.1% of the time. Total sleep time (TST) was 303.5 minutes. 26.7% of TST was in Stage N1 sleep, 13.7% TST in slow wave sleep, and 3.8% TST in REM sleep. The REM latency was 256.5 minutes. Sleep architecture was significantly disrupted due to underlying obstructive sleep apnea.    Respiratory: Snoring was present. There was significant FUNMILAYO (obstructive sleep apnea) based on AHI (apnea hypopnea index) criteria. The overall AHI was 6.5 with an oxygen robert of 86.0%.  The supine AHI was 6.8 and the REM AHI was 47.0. The patient did not qualify for a split night study due to an insufficient number of events in the first half of the study.    Motor movement / Parasomnia:  Significant periodic limb movements were not evident.    Cardiac: single lead EKG revealed normal sinus rhythm    Impression:    -Obstructive sleep apnea  (REM-predominant with REM AHI=  47 vs NREM AHI =  4.9)    Recommendations:    -treatment for the mild FUNMILAYO seen in this study could be considered  -the patient has follow up with Sleep Medicine        Lalito Prieto MD    (This Sleep Study was interpreted by a Board Certified Sleep Specialist who conducted an epoch-by-epoch review of the entire raw data recording.)  (The indication for this sleep study was reviewed and deemed  appropriate by AASM Practice Parameters or other reasons by a Board Certified Sleep Specialist.)        TABLES

## 2022-03-19 ENCOUNTER — PATIENT MESSAGE (OUTPATIENT)
Dept: SLEEP MEDICINE | Facility: CLINIC | Age: 42
End: 2022-03-19
Payer: COMMERCIAL

## 2022-03-22 ENCOUNTER — SPECIALTY PHARMACY (OUTPATIENT)
Dept: PHARMACY | Facility: CLINIC | Age: 42
End: 2022-03-22
Payer: COMMERCIAL

## 2022-03-22 ENCOUNTER — LAB VISIT (OUTPATIENT)
Dept: LAB | Facility: HOSPITAL | Age: 42
End: 2022-03-22
Attending: PSYCHIATRY & NEUROLOGY
Payer: COMMERCIAL

## 2022-03-22 ENCOUNTER — OFFICE VISIT (OUTPATIENT)
Dept: NEUROLOGY | Facility: CLINIC | Age: 42
End: 2022-03-22
Payer: COMMERCIAL

## 2022-03-22 VITALS
HEIGHT: 64 IN | DIASTOLIC BLOOD PRESSURE: 98 MMHG | BODY MASS INDEX: 50.02 KG/M2 | HEART RATE: 114 BPM | SYSTOLIC BLOOD PRESSURE: 151 MMHG | WEIGHT: 293 LBS

## 2022-03-22 DIAGNOSIS — R20.2 PARESTHESIAS: ICD-10-CM

## 2022-03-22 DIAGNOSIS — G37.9 DEMYELINATING CHANGES IN BRAIN: Primary | ICD-10-CM

## 2022-03-22 LAB
TSH SERPL DL<=0.005 MIU/L-ACNC: 2.91 UIU/ML (ref 0.4–4)
VIT B12 SERPL-MCNC: 345 PG/ML (ref 210–950)

## 2022-03-22 PROCEDURE — 3008F PR BODY MASS INDEX (BMI) DOCUMENTED: ICD-10-PCS | Mod: CPTII,S$GLB,, | Performed by: PSYCHIATRY & NEUROLOGY

## 2022-03-22 PROCEDURE — 83516 IMMUNOASSAY NONANTIBODY: CPT | Performed by: PSYCHIATRY & NEUROLOGY

## 2022-03-22 PROCEDURE — 86038 ANTINUCLEAR ANTIBODIES: CPT | Performed by: PSYCHIATRY & NEUROLOGY

## 2022-03-22 PROCEDURE — 1159F MED LIST DOCD IN RCRD: CPT | Mod: CPTII,S$GLB,, | Performed by: PSYCHIATRY & NEUROLOGY

## 2022-03-22 PROCEDURE — 99999 PR PBB SHADOW E&M-EST. PATIENT-LVL V: CPT | Mod: PBBFAC,,, | Performed by: PSYCHIATRY & NEUROLOGY

## 2022-03-22 PROCEDURE — 82607 VITAMIN B-12: CPT | Performed by: PSYCHIATRY & NEUROLOGY

## 2022-03-22 PROCEDURE — 3077F PR MOST RECENT SYSTOLIC BLOOD PRESSURE >= 140 MM HG: ICD-10-PCS | Mod: CPTII,S$GLB,, | Performed by: PSYCHIATRY & NEUROLOGY

## 2022-03-22 PROCEDURE — 1160F PR REVIEW ALL MEDS BY PRESCRIBER/CLIN PHARMACIST DOCUMENTED: ICD-10-PCS | Mod: CPTII,S$GLB,, | Performed by: PSYCHIATRY & NEUROLOGY

## 2022-03-22 PROCEDURE — 99205 PR OFFICE/OUTPT VISIT, NEW, LEVL V, 60-74 MIN: ICD-10-PCS | Mod: S$GLB,,, | Performed by: PSYCHIATRY & NEUROLOGY

## 2022-03-22 PROCEDURE — 3077F SYST BP >= 140 MM HG: CPT | Mod: CPTII,S$GLB,, | Performed by: PSYCHIATRY & NEUROLOGY

## 2022-03-22 PROCEDURE — 99205 OFFICE O/P NEW HI 60 MIN: CPT | Mod: S$GLB,,, | Performed by: PSYCHIATRY & NEUROLOGY

## 2022-03-22 PROCEDURE — 86235 NUCLEAR ANTIGEN ANTIBODY: CPT | Mod: 59 | Performed by: PSYCHIATRY & NEUROLOGY

## 2022-03-22 PROCEDURE — 84425 ASSAY OF VITAMIN B-1: CPT | Performed by: PSYCHIATRY & NEUROLOGY

## 2022-03-22 PROCEDURE — 3080F PR MOST RECENT DIASTOLIC BLOOD PRESSURE >= 90 MM HG: ICD-10-PCS | Mod: CPTII,S$GLB,, | Performed by: PSYCHIATRY & NEUROLOGY

## 2022-03-22 PROCEDURE — 86235 NUCLEAR ANTIGEN ANTIBODY: CPT | Performed by: PSYCHIATRY & NEUROLOGY

## 2022-03-22 PROCEDURE — 1159F PR MEDICATION LIST DOCUMENTED IN MEDICAL RECORD: ICD-10-PCS | Mod: CPTII,S$GLB,, | Performed by: PSYCHIATRY & NEUROLOGY

## 2022-03-22 PROCEDURE — 1160F RVW MEDS BY RX/DR IN RCRD: CPT | Mod: CPTII,S$GLB,, | Performed by: PSYCHIATRY & NEUROLOGY

## 2022-03-22 PROCEDURE — 99999 PR PBB SHADOW E&M-EST. PATIENT-LVL V: ICD-10-PCS | Mod: PBBFAC,,, | Performed by: PSYCHIATRY & NEUROLOGY

## 2022-03-22 PROCEDURE — 3080F DIAST BP >= 90 MM HG: CPT | Mod: CPTII,S$GLB,, | Performed by: PSYCHIATRY & NEUROLOGY

## 2022-03-22 PROCEDURE — 84443 ASSAY THYROID STIM HORMONE: CPT | Performed by: PSYCHIATRY & NEUROLOGY

## 2022-03-22 PROCEDURE — 3008F BODY MASS INDEX DOCD: CPT | Mod: CPTII,S$GLB,, | Performed by: PSYCHIATRY & NEUROLOGY

## 2022-03-22 PROCEDURE — 36415 COLL VENOUS BLD VENIPUNCTURE: CPT | Performed by: PSYCHIATRY & NEUROLOGY

## 2022-03-22 RX ORDER — DULOXETIN HYDROCHLORIDE 60 MG/1
60 CAPSULE, DELAYED RELEASE ORAL DAILY
Qty: 90 CAPSULE | Refills: 3 | Status: SHIPPED | OUTPATIENT
Start: 2022-03-22 | End: 2022-06-16 | Stop reason: SDUPTHER

## 2022-03-22 NOTE — PROGRESS NOTES
[facility] - NEUROLOGY 7TH [address] [facility], [place]    Date: March 22, 2022   Patient Name: Kelly Dove   MRN: [id number]   PCP: Horacio Erazo  Referring Provider: Lavelle Rea*    Assessment:      This is Kelly Dove, 42 y.o. female with recent diagnosis of Tayakasu arteritis and diffuse paresthesias, will rule out alternate causes and central etiology     Plan:      MRI brain and C-spine  EMG  Labs  Increase cymbalta to 60mg daily    Follow up when complete       Greater than 60 minutes spent in chart review, documentation, independent review of imaging, and face to face time with patient    I discussed side effects of the medications. I asked the patient to stop the medication if she notices serious adverse effects as we discussed and to seek immediate medical attention at an ER.     Braden Palmer MD  [facility]   Department of Neurology    Subjective:        HPI:   Ms. Kelly Dove is a 42 y.o. 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  She was started on MTX and prednisone taper which she has completed and started Humira earlier this months.    Shortly after discharge she developed diffuse paresthesias in the hands, feet, and face - these have not subsided thus far despite current treatment regiment of underlying disease.  She was prescribed GBP which did not alleviate symptoms and led to development of LE edema.  She was started on cymbalta about two months ago which has not alleviated symptoms.      PAST MEDICAL HISTORY:  No past medical history on file.    PAST SURGICAL HISTORY:  Past Surgical History:   Procedure Laterality Date     ABDOMINAL SURGERY      BREAST BIOPSY      HERNIA REPAIR      HYSTERECTOMY      OOPHORECTOMY      TONSILLECTOMY         CURRENT MEDS:  Current Outpatient Medications   Medication Sig Dispense Refill    acetaminophen (TYLENOL) 500 MG tablet Take 2 tablets (1,000 mg total) by mouth every 8 (eight) hours as needed for Pain. 42 tablet 0    adalimumab (HUMIRA PEN) PnKt injection Inject 1 pen (40 mg total) into the skin every 14 (fourteen) days. 2 pen 11    calcium carbonate (TUMS) 200 mg calcium (500 mg) chewable tablet Take 2 tablets (1,000 mg total) by mouth once daily. 60 tablet 11    methotrexate 25 mg/mL injection Inject 1 mL (25 mg) into the skin weekly (Patient taking differently: Inject 1 mL (25 mg) into the skin weekly) 10 mL 1    ondansetron (ZOFRAN) 4 MG tablet 1 to 2 tablets a day for nausea 60 tablet 1    pneumoc 13-artie conj-dip cr,PF, (PREVNAR 13, PF,) 0.5 mL Syrg PCV 13 first and PPSV 23 after 8 weeks 0.5 mL 0    pneumococcal vaccine (PNU-IMMUNE 23) 25 mcg/0.5 mL injection PCV 13 first and PPSV 23 after 8 weeks 0.5 mL 0    promethazine (PHENERGAN) 25 MG tablet TAKE 1 TABLET(25 MG) BY MOUTH EVERY 4 HOURS 60 tablet 0    vitamin D (VITAMIN D3) 1000 units Tab Take 1 tablet (1,000 Units total) by mouth once daily. 30 tablet 2    DULoxetine (CYMBALTA) 60 MG capsule Take 1 capsule (60 mg total) by mouth once daily. 90 capsule 3    folic acid (FOLVITE) 1 MG tablet Take 1 tablet (1 mg total) by mouth 3 (three) times daily. 90 tablet 4     No current facility-administered medications for this visit.       ALLERGIES:  Review of patient's allergies indicates:  No Known Allergies    FAMILY HISTORY:  Family History   Problem Relation Age of Onset    Ovarian cancer Mother        SOCIAL HISTORY:  Social History     Tobacco Use    Smoking status: Current Every Day Smoker    Smokeless tobacco: Never Used   Substance Use Topics    Alcohol use: Yes     Comment: social    Drug use: Not Currently  "      Review of Systems:  12 review of systems is negative except for the symptoms mentioned in HPI.        Objective:     Vitals:    03/22/22 1119   BP: (!) 151/98   Pulse: (!) 114   Weight: (!) 166 kg (366 lb)   Height: 5' 4" (1.626 m)       General: NAD, well nourished   Eyes: no tearing, discharge, no erythema   ENT: moist mucous membranes of the oral cavity, nares patent    Neck: Supple, full range of motion  Cardiovascular: Warm and well perfused, pulses decreased in right versus left in upper more than lower extremity  Lungs: Normal work of breathing, normal chest wall excursions  Skin: No rash, lesions, or breakdown on exposed skin  Psychiatry: Mood and affect are appropriate   Abdomen: soft, non tender, non distended  Extremeties: No cyanosis, clubbing or edema.    Neurological   MENTAL STATUS: Alert and oriented to person, place, and time. Attention and concentration within normal limits. Speech without dysarthria, able to name and repeat without difficulty. Recent and remote memory within normal limits   CRANIAL NERVES: Visual fields intact. PERRL. EOMI. Facial sensation intact. Face symmetrical. Hearing grossly intact. Full shoulder shrug bilaterally. Tongue protrudes midline   SENSORY: Sensation is decreased to vibration to the ankle. +Romberg.   MOTOR: Normal bulk and tone. No pronator drift.  5/5 deltoid, biceps, triceps, interosseous, hand  bilaterally. 5/5 iliopsoas, knee extension/flexion, foot dorsi/plantarflexion bilaterally.    REFLEXES: DTR quiet throughout  CEREBELLAR/COORDINATION/GAIT: Gait steady with normal arm swing and stride length.  Finger to nose intact. Normal rapid alternating movements.       "

## 2022-03-22 NOTE — TELEPHONE ENCOUNTER
Specialty Pharmacy - Refill Coordination    Specialty Medication Orders Linked to Encounter    Flowsheet Row Most Recent Value   Medication #1 adalimumab (HUMIRA PEN) PnKt injection (Order#[id number], Rx#[id number])   Medication #2 methotrexate 25 mg/mL injection (Order#[id number], Rx#[id number])          Refill Questions - Documented Responses    Flowsheet Row Most Recent Value   Patient Availability and HIPAA Verification    Does patient want to proceed with activity? Yes   HIPAA/medical authority confirmed? Yes   Relationship to patient of person spoken to? Self   Refill Screening Questions    Changes to allergies? No   Changes to medications? No   New conditions since last clinic visit? No   Unplanned office visit, urgent care, ED, or hospital admission in the last 4 weeks? No   How does patient/caregiver feel medication is working? Too soon to tell   Financial problems or insurance changes? No   How many doses of your specialty medications were missed in the last 4 weeks? 0   Would patient like to speak to a pharmacist? No   When does the patient need to receive the medication? 04/01/22   Refill Delivery Questions    When does the patient need to receive the medication? 04/01/22   Shipping Address Home   Address in [facility] confirmed and updated if neccessary? Yes   Expected Copay ($) 18.34   Is the patient able to afford the medication copay? Yes   Payment Method CC on file   Days supply of Refill 28   Supplies needed? No supplies needed   Refill activity completed? Yes   Refill activity plan Refill scheduled   Shipment/Pickup Date: 03/28/22          Current Outpatient Medications   Medication Sig    acetaminophen (TYLENOL) 500 MG tablet Take 2 tablets (1,000 mg total) by mouth every 8 (eight) hours as needed for Pain.    adalimumab (HUMIRA PEN) PnKt injection Inject 1 pen (40 mg total) into the skin every 14 (fourteen) days.    calcium carbonate (TUMS) 200 mg calcium (500 mg) chewable tablet Take  2 tablets (1,000 mg total) by mouth once daily.    DULoxetine (CYMBALTA) 30 MG capsule Take 1 capsule (30 mg total) by mouth once daily.    folic acid (FOLVITE) 1 MG tablet Take 1 tablet (1 mg total) by mouth 3 (three) times daily.    methotrexate 25 mg/mL injection Inject 1 mL (25 mg) into the skin weekly    ondansetron (ZOFRAN) 4 MG tablet 1 to 2 tablets a day for nausea    pneumoc 13-artie conj-dip cr,PF, (PREVNAR 13, PF,) 0.5 mL Syrg PCV 13 first and PPSV 23 after 8 weeks (Patient not taking: Reported on 12/3/2021)    pneumococcal vaccine (PNU-IMMUNE 23) 25 mcg/0.5 mL injection PCV 13 first and PPSV 23 after 8 weeks (Patient not taking: Reported on 12/3/2021)    promethazine (PHENERGAN) 25 MG tablet TAKE 1 TABLET(25 MG) BY MOUTH EVERY 4 HOURS    vitamin D (VITAMIN D3) 1000 units Tab Take 1 tablet (1,000 Units total) by mouth once daily.   Last reviewed on 2/28/2022  2:17 PM by Jackie Pastor, PharmD    Review of patient's allergies indicates:  No Known Allergies Last reviewed on  2/28/2022 2:18 PM by Jackie Pastor      Tasks added this encounter   4/22/2022 - Refill Call (Auto Added)   Tasks due within next 3 months   No tasks due.     [facility] - Specialty Pharmacy  [address] [place] [id number]  Phone: [phone]  Fax: [phone]

## 2022-03-23 ENCOUNTER — OFFICE VISIT (OUTPATIENT)
Dept: VASCULAR SURGERY | Facility: CLINIC | Age: 42
End: 2022-03-23
Payer: COMMERCIAL

## 2022-03-23 ENCOUNTER — PATIENT MESSAGE (OUTPATIENT)
Dept: NEUROLOGY | Facility: CLINIC | Age: 42
End: 2022-03-23
Payer: COMMERCIAL

## 2022-03-23 VITALS
TEMPERATURE: 99 F | HEIGHT: 66 IN | BODY MASS INDEX: 47.09 KG/M2 | HEART RATE: 114 BPM | SYSTOLIC BLOOD PRESSURE: 122 MMHG | WEIGHT: 293 LBS | DIASTOLIC BLOOD PRESSURE: 88 MMHG

## 2022-03-23 DIAGNOSIS — M31.4 TAKAYASU'S ARTERITIS: ICD-10-CM

## 2022-03-23 LAB — ANA SER QL IF: NORMAL

## 2022-03-23 PROCEDURE — 3074F PR MOST RECENT SYSTOLIC BLOOD PRESSURE < 130 MM HG: ICD-10-PCS | Mod: CPTII,S$GLB,, | Performed by: SURGERY

## 2022-03-23 PROCEDURE — 3079F DIAST BP 80-89 MM HG: CPT | Mod: CPTII,S$GLB,, | Performed by: SURGERY

## 2022-03-23 PROCEDURE — 3079F PR MOST RECENT DIASTOLIC BLOOD PRESSURE 80-89 MM HG: ICD-10-PCS | Mod: CPTII,S$GLB,, | Performed by: SURGERY

## 2022-03-23 PROCEDURE — 3008F BODY MASS INDEX DOCD: CPT | Mod: CPTII,S$GLB,, | Performed by: SURGERY

## 2022-03-23 PROCEDURE — 99215 OFFICE O/P EST HI 40 MIN: CPT | Mod: S$GLB,,, | Performed by: SURGERY

## 2022-03-23 PROCEDURE — 99999 PR PBB SHADOW E&M-EST. PATIENT-LVL IV: CPT | Mod: PBBFAC,,, | Performed by: SURGERY

## 2022-03-23 PROCEDURE — 3008F PR BODY MASS INDEX (BMI) DOCUMENTED: ICD-10-PCS | Mod: CPTII,S$GLB,, | Performed by: SURGERY

## 2022-03-23 PROCEDURE — 1159F MED LIST DOCD IN RCRD: CPT | Mod: CPTII,S$GLB,, | Performed by: SURGERY

## 2022-03-23 PROCEDURE — 3074F SYST BP LT 130 MM HG: CPT | Mod: CPTII,S$GLB,, | Performed by: SURGERY

## 2022-03-23 PROCEDURE — 99999 PR PBB SHADOW E&M-EST. PATIENT-LVL IV: ICD-10-PCS | Mod: PBBFAC,,, | Performed by: SURGERY

## 2022-03-23 PROCEDURE — 1159F PR MEDICATION LIST DOCUMENTED IN MEDICAL RECORD: ICD-10-PCS | Mod: CPTII,S$GLB,, | Performed by: SURGERY

## 2022-03-23 PROCEDURE — 99215 PR OFFICE/OUTPT VISIT, EST, LEVL V, 40-54 MIN: ICD-10-PCS | Mod: S$GLB,,, | Performed by: SURGERY

## 2022-03-23 NOTE — PROGRESS NOTES
Kelly Dove  03/23/2022    HPI:    Ms Dove is a 42 y.o. old female h/o recent Dx Takayasu's arteritis (originally Dx by Rheum in Oct 2021, following a R radial cath attempt at [facility] for ?angina)., morbid obesity (BMI 57), and active tobacco use being followed for right subclavian artery occlusion.  She reports occasional exertional pain in her right arm along with occasional numbness.   She denies fever, chills, weakness, cyanosis, headaches or blurry vision.     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  She was started on MTX and prednisone taper which she has completed and started Humira since. She denies fever, chills, weakness, cyanosis, headaches or blurry vision.     9/21/21: 1.4cm splenic a. aneurysm and subclavian occlusion noted, admit for Takayasu arteritis workup with associated chest pain and shortness of breath.    No MI/stroke  Tobacco use: < 10 cigs/d; > 25 pack yrs    Has 3 children    Works for [employer]; MS in education    Significant family history of autoimmune diseases (sister with RA, son with JEFFREY)     No past medical history on file.  Past Surgical History:   Procedure Laterality Date    ABDOMINAL SURGERY      BREAST BIOPSY      HERNIA REPAIR      HYSTERECTOMY      OOPHORECTOMY      TONSILLECTOMY       Family History   Problem Relation Age of Onset    Ovarian cancer Mother      Social History     Socioeconomic History    Marital status:    Tobacco Use    Smoking status: Current Every Day Smoker    Smokeless tobacco: Never Used   Substance and Sexual Activity    Alcohol use: Yes     Comment: social    Drug use: Not Currently       Current Outpatient Medications:      acetaminophen (TYLENOL) 500 MG tablet, Take 2 tablets (1,000 mg total) by mouth every 8 (eight) hours as needed for Pain., Disp: 42 tablet, Rfl: 0    adalimumab (HUMIRA PEN) PnKt injection, Inject 1 pen (40 mg total) into the skin every 14 (fourteen) days., Disp: 2 pen, Rfl: 11    calcium carbonate (TUMS) 200 mg calcium (500 mg) chewable tablet, Take 2 tablets (1,000 mg total) by mouth once daily., Disp: 60 tablet, Rfl: 11    DULoxetine (CYMBALTA) 60 MG capsule, Take 1 capsule (60 mg total) by mouth once daily., Disp: 90 capsule, Rfl: 3    methotrexate 25 mg/mL injection, Inject 1 mL (25 mg) into the skin weekly (Patient taking differently: Inject 1 mL (25 mg) into the skin weekly), Disp: 10 mL, Rfl: 1    ondansetron (ZOFRAN) 4 MG tablet, 1 to 2 tablets a day for nausea, Disp: 60 tablet, Rfl: 1    pneumoc 13-artie conj-dip cr,PF, (PREVNAR 13, PF,) 0.5 mL Syrg, PCV 13 first and PPSV 23 after 8 weeks, Disp: 0.5 mL, Rfl: 0    pneumococcal vaccine (PNU-IMMUNE 23) 25 mcg/0.5 mL injection, PCV 13 first and PPSV 23 after 8 weeks, Disp: 0.5 mL, Rfl: 0    promethazine (PHENERGAN) 25 MG tablet, TAKE 1 TABLET(25 MG) BY MOUTH EVERY 4 HOURS, Disp: 60 tablet, Rfl: 0    vitamin D (VITAMIN D3) 1000 units Tab, Take 1 tablet (1,000 Units total) by mouth once daily., Disp: 30 tablet, Rfl: 2    folic acid (FOLVITE) 1 MG tablet, Take 1 tablet (1 mg total) by mouth 3 (three) times daily., Disp: 90 tablet, Rfl: 4     REVIEW OF SYSTEMS:  General: negative; Respiratory: no cough, shortness of breath, or wheezing; Cardiovascular: no chest pain or dyspnea on exertion; Gastrointestinal: no abdominal pain, change in bowel habits, or bloody stools; Genito-Urinary: no dysuria, trouble voiding, or hematuria; Musculoskeletal: no weakness or decreased range of motion, Neurological: no TIA or stroke symptoms; Psychiatric: no nervousness, anxiety or depression.    PHYSICAL EXAM:   Right Arm BP - Sittin/88 (22 1333)  Left Arm BP -  Sittin/101 (22 1333)  Pulse: (!) 114  Temp: 98.7 °F (37.1 °C)    General appearance: Alert, well-appearing, and in no distress.  Oriented to person, place, and time  Neurological: Normal speech, no focal findings noted; CN II - XII grossly intact  Musculoskeletal:Digits/nail without cyanosis/clubbing.  Normal muscle strength/tone.  Neck: Supple, no significant adenopathy, no carotid bruit can be auscultated  Chest:Clear to auscultation, no wheezes, rales or rhonchi, symmetric air entry, No use of accessory muscles   Cardiac:Normal rate and regular rhythm, S1 and S2 normal  Abdomen:Soft, nontender, nondistended, no masses or organomegaly, No rebound tenderness noted; bowel sounds normal, Pulsatile aortic mass is no palpable. No groin adenopathy   Extremities:   No R radial pulse on the right. 2+ radial pulse on the left.    2+ DPs pedal pulses palpable.no pre-tibial edema. No ulcerations    LAB RESULTS:  Lab Results   Component Value Date    K 4.3 02/10/2022    K 4.0 10/30/2021    K 3.6 10/29/2021    CREATININE 0.6 02/10/2022    CREATININE 0.6 10/30/2021    CREATININE 0.6 10/29/2021     Lab Results   Component Value Date    WBC 6.64 02/10/2022    WBC 6.28 10/30/2021    WBC 6.67 10/29/2021    HCT 44.1 02/10/2022    HCT 39.2 10/30/2021    HCT 38.1 10/29/2021     02/10/2022     10/30/2021     10/29/2021     Lab Results   Component Value Date    HGBA1C 4.9 10/28/2021     IMAGING:  CTA Chest: high-grade stenosis of the right subclavian artery approximately 2.5 cm    PPGs: Biphasic in R 1-5; triphasic L 1 -5th digits    Carotid US:  R, L ICA: no stenosis  R subclavian is without flow  R axillary and brachial with normal flow     ABIs  R 1.36  L 1.32  Triphasic waveforms x2    CT a/p: 1.4cm partially calcified lesion   No aortic pathology    IMP/PLAN:  42 y.o. female with h/o recent Dx Takayasu's arteritis (originally Dx by Rheum in Oct 2021, following a R radial cath attempt at  [facility] for ?angina)., morbid obesity (BMI 57), and active tobacco with a right subclavian artery occlusion - has mild R arm claudication  Re-assured, not limb threatening   Start ASA 81 po QD  Quit tobacco   Weight loss  F/u in 18 months PPGs and carotid/subclavian u/s    Justin Massey MD DFSVS FACS   Vascular/Endovascular Surgery    I have seen the patient and reviewed the resident's/fellow's note. I have also personally interviewed and examined the patient at bedside and agree with the findings.      We discussed smoking cessation for greater than 10 minutes as well as the impact that continued smoking can have on the progression of Vascular disease. This discussion included the use of medications, patches, nicotine gum, and lifestyle modifications.

## 2022-03-24 DIAGNOSIS — F41.9 ANXIETY: Primary | ICD-10-CM

## 2022-03-24 RX ORDER — LORAZEPAM 1 MG/1
1 TABLET ORAL EVERY 4 HOURS PRN
Qty: 2 TABLET | Refills: 0 | Status: SHIPPED | OUTPATIENT
Start: 2022-03-24 | End: 2022-05-12

## 2022-03-25 LAB
ANTI-SSA ANTIBODY: 0.07 RATIO (ref 0–0.99)
ANTI-SSA INTERPRETATION: NEGATIVE
ANTI-SSB ANTIBODY: 0.06 RATIO (ref 0–0.99)
ANTI-SSB INTERPRETATION: NEGATIVE

## 2022-03-28 LAB — VIT B1 BLD-MCNC: 68 UG/L (ref 38–122)

## 2022-03-29 LAB — MAG IGM TITR SER IA: <1500 BUHLMANN TITER UNIT

## 2022-04-03 ENCOUNTER — HOSPITAL ENCOUNTER (OUTPATIENT)
Dept: RADIOLOGY | Facility: HOSPITAL | Age: 42
Discharge: HOME | End: 2022-04-03
Attending: PSYCHIATRY & NEUROLOGY
Payer: COMMERCIAL

## 2022-04-03 DIAGNOSIS — R20.2 PARESTHESIAS: ICD-10-CM

## 2022-04-03 DIAGNOSIS — G37.9 DEMYELINATING CHANGES IN BRAIN: ICD-10-CM

## 2022-04-03 PROCEDURE — 70553 MRI BRAIN STEM W/O & W/DYE: CPT | Mod: TC

## 2022-04-03 PROCEDURE — 25500020 PHARM REV CODE 255: Performed by: PSYCHIATRY & NEUROLOGY

## 2022-04-03 PROCEDURE — A9585 GADOBUTROL INJECTION: HCPCS | Performed by: PSYCHIATRY & NEUROLOGY

## 2022-04-03 PROCEDURE — 72156 MRI NECK SPINE W/O & W/DYE: CPT | Mod: TC

## 2022-04-03 RX ORDER — GADOBUTROL 604.72 MG/ML
10 INJECTION INTRAVENOUS
Status: COMPLETED | OUTPATIENT
Start: 2022-04-03 | End: 2022-04-03

## 2022-04-03 RX ADMIN — GADOBUTROL 10 ML: 604.72 INJECTION INTRAVENOUS at 01:04

## 2022-04-08 ENCOUNTER — PATIENT MESSAGE (OUTPATIENT)
Dept: NEUROLOGY | Facility: CLINIC | Age: 42
End: 2022-04-08
Payer: COMMERCIAL

## 2022-04-18 ENCOUNTER — PATIENT MESSAGE (OUTPATIENT)
Dept: ADMINISTRATIVE | Facility: OTHER | Age: 42
End: 2022-04-18
Payer: COMMERCIAL

## 2022-04-21 ENCOUNTER — OFFICE VISIT (OUTPATIENT)
Dept: FAMILY MEDICINE | Facility: CLINIC | Age: 42
End: 2022-04-21
Payer: COMMERCIAL

## 2022-04-21 VITALS
WEIGHT: 293 LBS | OXYGEN SATURATION: 98 % | DIASTOLIC BLOOD PRESSURE: 98 MMHG | HEIGHT: 66 IN | HEART RATE: 101 BPM | BODY MASS INDEX: 47.09 KG/M2 | SYSTOLIC BLOOD PRESSURE: 124 MMHG

## 2022-04-21 DIAGNOSIS — Z23 NEED FOR TDAP VACCINATION: ICD-10-CM

## 2022-04-21 DIAGNOSIS — E66.01 SEVERE OBESITY (BMI >= 40): ICD-10-CM

## 2022-04-21 DIAGNOSIS — M31.4 ARTERITIS, TAKAYASU: Primary | ICD-10-CM

## 2022-04-21 DIAGNOSIS — Z72.0 TOBACCO ABUSE: ICD-10-CM

## 2022-04-21 DIAGNOSIS — Z13.6 SCREENING FOR CARDIOVASCULAR CONDITION: ICD-10-CM

## 2022-04-21 DIAGNOSIS — G47.33 OSA (OBSTRUCTIVE SLEEP APNEA): ICD-10-CM

## 2022-04-21 PROCEDURE — 3079F PR MOST RECENT DIASTOLIC BLOOD PRESSURE 80-89 MM HG: ICD-10-PCS | Mod: CPTII,S$GLB,, | Performed by: FAMILY MEDICINE

## 2022-04-21 PROCEDURE — 90471 IMMUNIZATION ADMIN: CPT | Mod: S$GLB,,, | Performed by: FAMILY MEDICINE

## 2022-04-21 PROCEDURE — 90715 TDAP VACCINE 7 YRS/> IM: CPT | Mod: S$GLB,,, | Performed by: FAMILY MEDICINE

## 2022-04-21 PROCEDURE — 90471 TDAP VACCINE GREATER THAN OR EQUAL TO 7YO IM: ICD-10-PCS | Mod: S$GLB,,, | Performed by: FAMILY MEDICINE

## 2022-04-21 PROCEDURE — 3008F BODY MASS INDEX DOCD: CPT | Mod: CPTII,S$GLB,, | Performed by: FAMILY MEDICINE

## 2022-04-21 PROCEDURE — 1159F PR MEDICATION LIST DOCUMENTED IN MEDICAL RECORD: ICD-10-PCS | Mod: CPTII,S$GLB,, | Performed by: FAMILY MEDICINE

## 2022-04-21 PROCEDURE — 99999 PR PBB SHADOW E&M-EST. PATIENT-LVL IV: ICD-10-PCS | Mod: PBBFAC,,, | Performed by: FAMILY MEDICINE

## 2022-04-21 PROCEDURE — 3074F PR MOST RECENT SYSTOLIC BLOOD PRESSURE < 130 MM HG: ICD-10-PCS | Mod: CPTII,S$GLB,, | Performed by: FAMILY MEDICINE

## 2022-04-21 PROCEDURE — 99214 OFFICE O/P EST MOD 30 MIN: CPT | Mod: 25,S$GLB,, | Performed by: FAMILY MEDICINE

## 2022-04-21 PROCEDURE — 3079F DIAST BP 80-89 MM HG: CPT | Mod: CPTII,S$GLB,, | Performed by: FAMILY MEDICINE

## 2022-04-21 PROCEDURE — 1160F PR REVIEW ALL MEDS BY PRESCRIBER/CLIN PHARMACIST DOCUMENTED: ICD-10-PCS | Mod: CPTII,S$GLB,, | Performed by: FAMILY MEDICINE

## 2022-04-21 PROCEDURE — 90715 TDAP VACCINE GREATER THAN OR EQUAL TO 7YO IM: ICD-10-PCS | Mod: S$GLB,,, | Performed by: FAMILY MEDICINE

## 2022-04-21 PROCEDURE — 1160F RVW MEDS BY RX/DR IN RCRD: CPT | Mod: CPTII,S$GLB,, | Performed by: FAMILY MEDICINE

## 2022-04-21 PROCEDURE — 99999 PR PBB SHADOW E&M-EST. PATIENT-LVL IV: CPT | Mod: PBBFAC,,, | Performed by: FAMILY MEDICINE

## 2022-04-21 PROCEDURE — 1159F MED LIST DOCD IN RCRD: CPT | Mod: CPTII,S$GLB,, | Performed by: FAMILY MEDICINE

## 2022-04-21 PROCEDURE — 3008F PR BODY MASS INDEX (BMI) DOCUMENTED: ICD-10-PCS | Mod: CPTII,S$GLB,, | Performed by: FAMILY MEDICINE

## 2022-04-21 PROCEDURE — 99214 PR OFFICE/OUTPT VISIT, EST, LEVL IV, 30-39 MIN: ICD-10-PCS | Mod: 25,S$GLB,, | Performed by: FAMILY MEDICINE

## 2022-04-21 PROCEDURE — 3074F SYST BP LT 130 MM HG: CPT | Mod: CPTII,S$GLB,, | Performed by: FAMILY MEDICINE

## 2022-04-21 RX ORDER — GABAPENTIN 100 MG/1
CAPSULE ORAL
COMMUNITY
Start: 2022-02-08 | End: 2022-05-12

## 2022-04-21 RX ORDER — PANTOPRAZOLE SODIUM 40 MG/1
40 TABLET, DELAYED RELEASE ORAL DAILY
COMMUNITY
Start: 2022-04-17 | End: 2022-05-12

## 2022-04-21 NOTE — PROGRESS NOTES
"Subjective:       Patient ID: Kelly Dove is a 42 y.o. female.    Chief Complaint: Follow-up (3 mnth )    Here today to follow up on chronic medical conditions.  She continues to work with her Rheumatologist.  She was referred to sleep medicine and had a sleep study which demonstrated mild FUNMILAYO.  They asked her if she wanted to pursue treatment with CPAP and she wants to try weight loss first.  She saw neuro and had a normal MRI and will have an EMG done.  She continues to work on reducing her smoking.  She continues to deal with stress and fatigue related to her condition.  She is struggling with her work and is considering taking some time off.    She was unable to get Pneumonia vaccine at pharmacy.  We discussed getting it here, but waiting a couple more months until we can give her Prevnar 20.    Review of Systems   Constitutional: Negative for activity change, appetite change, fatigue and fever.   Respiratory: Negative for cough, shortness of breath and wheezing.    Cardiovascular: Negative for chest pain and palpitations.   Gastrointestinal: Negative for abdominal pain, constipation, diarrhea and nausea.   Skin: Negative for pallor and rash.   Neurological: Negative for dizziness, syncope, light-headedness and headaches.       Objective:      Vitals:    04/21/22 0842 04/21/22 0921 04/21/22 0934   BP: (!) 140/84 (!) 124/94 124/88   Pulse: 101     SpO2: 98%     Weight: (!) 159.7 kg (352 lb 1.2 oz)     Height: 5' 6" (1.676 m)        Physical Exam  Constitutional:       General: She is not in acute distress.     Appearance: She is obese.   Cardiovascular:      Rate and Rhythm: Normal rate and regular rhythm.      Heart sounds: Normal heart sounds. No murmur heard.  Pulmonary:      Effort: Pulmonary effort is normal. No respiratory distress.      Breath sounds: Normal breath sounds. No wheezing, rhonchi or rales.   Skin:     General: Skin is warm and dry.   Neurological:      General: No focal deficit present. "      Mental Status: She is alert.   Psychiatric:         Mood and Affect: Mood normal.         Behavior: Behavior normal.         Thought Content: Thought content normal.         Assessment:       1. Arteritis, Takayasu    2. Severe obesity (BMI >= 40)    3. FUNMILAYO (obstructive sleep apnea)    4. Need for Tdap vaccination    5. Tobacco abuse    6. Screening for cardiovascular condition        Plan:       Arteritis, Takayasu    Severe obesity (BMI >= 40)    FUNMILAYO (obstructive sleep apnea)    Need for Tdap vaccination  -     Tdap Vaccine    Tobacco abuse    Screening for cardiovascular condition  -     Lipid Panel; Future; Expected date: 05/21/2022    We discussed her taking time off from work and she will check with her HR and get back to us.  Most likely will need FMLA paperwork.  She will check with sleep medicine about CPAP.      Medication List with Changes/Refills   Current Medications    ACETAMINOPHEN (TYLENOL) 500 MG TABLET    Take 2 tablets (1,000 mg total) by mouth every 8 (eight) hours as needed for Pain.    ADALIMUMAB (HUMIRA PEN) PNKT INJECTION    Inject 1 pen (40 mg total) into the skin every 14 (fourteen) days.    CALCIUM CARBONATE (TUMS) 200 MG CALCIUM (500 MG) CHEWABLE TABLET    Take 2 tablets (1,000 mg total) by mouth once daily.    DULOXETINE (CYMBALTA) 60 MG CAPSULE    Take 1 capsule (60 mg total) by mouth once daily.    FOLIC ACID (FOLVITE) 1 MG TABLET    Take 1 tablet (1 mg total) by mouth 3 (three) times daily.    GABAPENTIN (NEURONTIN) 100 MG CAPSULE        LORAZEPAM (ATIVAN) 1 MG TABLET    Take 1 tablet (1 mg total) by mouth every 4 (four) hours as needed for Anxiety.    METHOTREXATE 25 MG/ML INJECTION    Inject 1 mL (25 mg) into the skin weekly    ONDANSETRON (ZOFRAN) 4 MG TABLET    1 to 2 tablets a day for nausea    PANTOPRAZOLE (PROTONIX) 40 MG TABLET    Take 40 mg by mouth once daily.    PROMETHAZINE (PHENERGAN) 25 MG TABLET    TAKE 1 TABLET(25 MG) BY MOUTH EVERY 4 HOURS. NO MORE THAN 4 TABLETS  IN 24 HOURS    VITAMIN D (VITAMIN D3) 1000 UNITS TAB    Take 1 tablet (1,000 Units total) by mouth once daily.   Discontinued Medications    PNEUMOC 13-MADHURI CONJ-DIP CR,PF, (PREVNAR 13, PF,) 0.5 ML SYRG    PCV 13 first and PPSV 23 after 8 weeks    PNEUMOCOCCAL VACCINE (PNU-IMMUNE 23) 25 MCG/0.5 ML INJECTION    PCV 13 first and PPSV 23 after 8 weeks

## 2022-04-22 ENCOUNTER — PATIENT MESSAGE (OUTPATIENT)
Dept: PHARMACY | Facility: CLINIC | Age: 42
End: 2022-04-22
Payer: COMMERCIAL

## 2022-04-25 ENCOUNTER — SPECIALTY PHARMACY (OUTPATIENT)
Dept: PHARMACY | Facility: CLINIC | Age: 42
End: 2022-04-25
Payer: COMMERCIAL

## 2022-04-25 NOTE — TELEPHONE ENCOUNTER
Specialty Pharmacy - Refill Coordination    Specialty Medication Orders Linked to Encounter    Flowsheet Row Most Recent Value   Medication #1 adalimumab (HUMIRA PEN) PnKt injection (Order#[id number], Rx#[id number])   Medication #2 methotrexate 25 mg/mL injection (Order#[id number], Rx#[id number])          Refill Questions - Documented Responses    Flowsheet Row Most Recent Value   Patient Availability and HIPAA Verification    Does patient want to proceed with activity? Yes   HIPAA/medical authority confirmed? Yes   Relationship to patient of person spoken to? Self   Refill Screening Questions    Changes to allergies? No   Changes to medications? No   New conditions since last clinic visit? No   Unplanned office visit, urgent care, ED, or hospital admission in the last 4 weeks? No   How does patient/caregiver feel medication is working? Good   Financial problems or insurance changes? No   How many doses of your specialty medications were missed in the last 4 weeks? 0   Would patient like to speak to a pharmacist? No   When does the patient need to receive the medication? 05/02/22   Refill Delivery Questions    How will the patient receive the medication? Delivery Shanae   When does the patient need to receive the medication? 05/02/22   Shipping Address Home   Address in [facility] confirmed and updated if neccessary? Yes   Expected Copay ($) 18.34   Is the patient able to afford the medication copay? Yes   Payment Method CC on file   Days supply of Refill 28   Supplies needed? No supplies needed   Refill activity completed? Yes   Refill activity plan Refill scheduled   Shipment/Pickup Date: 04/27/22          Current Outpatient Medications   Medication Sig    acetaminophen (TYLENOL) 500 MG tablet Take 2 tablets (1,000 mg total) by mouth every 8 (eight) hours as needed for Pain.    adalimumab (HUMIRA PEN) PnKt injection Inject 1 pen (40 mg total) into the skin every 14 (fourteen) days.    calcium carbonate  (TUMS) 200 mg calcium (500 mg) chewable tablet Take 2 tablets (1,000 mg total) by mouth once daily.    DULoxetine (CYMBALTA) 60 MG capsule Take 1 capsule (60 mg total) by mouth once daily.    folic acid (FOLVITE) 1 MG tablet Take 1 tablet (1 mg total) by mouth 3 (three) times daily.    gabapentin (NEURONTIN) 100 MG capsule     LORazepam (ATIVAN) 1 MG tablet Take 1 tablet (1 mg total) by mouth every 4 (four) hours as needed for Anxiety.    methotrexate 25 mg/mL injection Inject 1 mL (25 mg) into the skin weekly (Patient taking differently: Inject 1 mL (25 mg) into the skin weekly)    ondansetron (ZOFRAN) 4 MG tablet 1 to 2 tablets a day for nausea    pantoprazole (PROTONIX) 40 MG tablet Take 40 mg by mouth once daily.    promethazine (PHENERGAN) 25 MG tablet TAKE 1 TABLET(25 MG) BY MOUTH EVERY 4 HOURS. NO MORE THAN 4 TABLETS IN 24 HOURS    vitamin D (VITAMIN D3) 1000 units Tab Take 1 tablet (1,000 Units total) by mouth once daily.   Last reviewed on 4/21/2022  9:35 AM by Horacio Erazo MD    Review of patient's allergies indicates:  No Known Allergies Last reviewed on  4/21/2022 9:35 AM by Horacio Erazo      Tasks added this encounter   5/23/2022 - Refill Call (Auto Added)   Tasks due within next 3 months   No tasks due.     Samantha Dugan [facility] - Specialty Pharmacy  [address] [place] [id number]  Phone: [phone]  Fax: [phone]

## 2022-04-30 ENCOUNTER — TELEPHONE (OUTPATIENT)
Dept: RHEUMATOLOGY | Facility: HOSPITAL | Age: 42
End: 2022-04-30
Payer: COMMERCIAL

## 2022-04-30 ENCOUNTER — NURSE TRIAGE (OUTPATIENT)
Dept: ADMINISTRATIVE | Facility: CLINIC | Age: 42
End: 2022-04-30
Payer: COMMERCIAL

## 2022-04-30 NOTE — TELEPHONE ENCOUNTER
ON CALL Rheumatology: Received a call from RN on call that patient stating that she had a sore throat and asking if she should hold humira and MTX. She is still able to swallow fluids. She has not checked her temperature. Instructed her to hold both Humira and MTX. Also instructed her to present to the ED if she should develop a fever or if her symptoms should worsen as she is currently on immunosuppression. Will sent message to outpatient Rheumatology Dr. Rea.      LBR 04/30/2022

## 2022-04-30 NOTE — TELEPHONE ENCOUNTER
Britton calling on call nurse states Crystal has  questions about meds and current symptoms. Specifically, pt has a sore throat, nasal congestion and can hardly talk and is supposed to take Humira and Methotrexate today. Does not want triage of symptoms. Wants to know if she should take meds today or hold off. Advised per triage protocol on call provider paged and if no call back within 30 mins, instructed to call back or go to UC/ED for symptoms. Verbalized understanding.       Reason for Disposition   [1] Caller has URGENT medicine question about med that PCP or specialist prescribed AND [2] triager unable to answer question    Protocols used: MEDICATION QUESTION CALL-A-AH

## 2022-05-02 ENCOUNTER — PATIENT MESSAGE (OUTPATIENT)
Dept: RHEUMATOLOGY | Facility: CLINIC | Age: 42
End: 2022-05-02
Payer: COMMERCIAL

## 2022-05-12 ENCOUNTER — OFFICE VISIT (OUTPATIENT)
Dept: FAMILY MEDICINE | Facility: CLINIC | Age: 42
End: 2022-05-12
Payer: COMMERCIAL

## 2022-05-12 VITALS
DIASTOLIC BLOOD PRESSURE: 82 MMHG | OXYGEN SATURATION: 95 % | BODY MASS INDEX: 47.09 KG/M2 | HEIGHT: 66 IN | SYSTOLIC BLOOD PRESSURE: 135 MMHG | WEIGHT: 293 LBS | HEART RATE: 88 BPM

## 2022-05-12 DIAGNOSIS — M31.4 ARTERITIS, TAKAYASU: Primary | ICD-10-CM

## 2022-05-12 DIAGNOSIS — E66.01 SEVERE OBESITY (BMI >= 40): ICD-10-CM

## 2022-05-12 PROCEDURE — 99999 PR PBB SHADOW E&M-EST. PATIENT-LVL III: CPT | Mod: PBBFAC,,, | Performed by: FAMILY MEDICINE

## 2022-05-12 PROCEDURE — 3075F PR MOST RECENT SYSTOLIC BLOOD PRESS GE 130-139MM HG: ICD-10-PCS | Mod: CPTII,S$GLB,, | Performed by: FAMILY MEDICINE

## 2022-05-12 PROCEDURE — 3008F PR BODY MASS INDEX (BMI) DOCUMENTED: ICD-10-PCS | Mod: CPTII,S$GLB,, | Performed by: FAMILY MEDICINE

## 2022-05-12 PROCEDURE — 3079F DIAST BP 80-89 MM HG: CPT | Mod: CPTII,S$GLB,, | Performed by: FAMILY MEDICINE

## 2022-05-12 PROCEDURE — 1159F PR MEDICATION LIST DOCUMENTED IN MEDICAL RECORD: ICD-10-PCS | Mod: CPTII,S$GLB,, | Performed by: FAMILY MEDICINE

## 2022-05-12 PROCEDURE — 1160F PR REVIEW ALL MEDS BY PRESCRIBER/CLIN PHARMACIST DOCUMENTED: ICD-10-PCS | Mod: CPTII,S$GLB,, | Performed by: FAMILY MEDICINE

## 2022-05-12 PROCEDURE — 3008F BODY MASS INDEX DOCD: CPT | Mod: CPTII,S$GLB,, | Performed by: FAMILY MEDICINE

## 2022-05-12 PROCEDURE — 1160F RVW MEDS BY RX/DR IN RCRD: CPT | Mod: CPTII,S$GLB,, | Performed by: FAMILY MEDICINE

## 2022-05-12 PROCEDURE — 99999 PR PBB SHADOW E&M-EST. PATIENT-LVL III: ICD-10-PCS | Mod: PBBFAC,,, | Performed by: FAMILY MEDICINE

## 2022-05-12 PROCEDURE — 3079F PR MOST RECENT DIASTOLIC BLOOD PRESSURE 80-89 MM HG: ICD-10-PCS | Mod: CPTII,S$GLB,, | Performed by: FAMILY MEDICINE

## 2022-05-12 PROCEDURE — 99214 OFFICE O/P EST MOD 30 MIN: CPT | Mod: S$GLB,,, | Performed by: FAMILY MEDICINE

## 2022-05-12 PROCEDURE — 3075F SYST BP GE 130 - 139MM HG: CPT | Mod: CPTII,S$GLB,, | Performed by: FAMILY MEDICINE

## 2022-05-12 PROCEDURE — 99214 PR OFFICE/OUTPT VISIT, EST, LEVL IV, 30-39 MIN: ICD-10-PCS | Mod: S$GLB,,, | Performed by: FAMILY MEDICINE

## 2022-05-12 PROCEDURE — 1159F MED LIST DOCD IN RCRD: CPT | Mod: CPTII,S$GLB,, | Performed by: FAMILY MEDICINE

## 2022-05-12 NOTE — PROGRESS NOTES
"Subjective:       Patient ID: Kelly Dove is a 42 y.o. female.    Chief Complaint: fmla paperwork, Mole (Stomach area discolored ), and Hypertension (F/u)    Here today to f/u on her HTN and Takayasu Arteritis.  She has decided to take some time off work.  She has FMLA paper work to be completed today.  She would like to be off for a 6 week period to try to better recover.    High Blood Pressure:  She has been checking her BP at home.  Her average is 135/82    Review of Systems   Constitutional: Negative for activity change, appetite change, fatigue and fever.   Respiratory: Negative for cough, shortness of breath and wheezing.    Cardiovascular: Negative for chest pain and palpitations.   Gastrointestinal: Negative for abdominal pain, constipation, diarrhea and nausea.   Skin: Negative for pallor and rash.   Neurological: Negative for dizziness, syncope, light-headedness and headaches.       Objective:      Vitals:    05/12/22 0834 05/12/22 0851   BP: (!) 140/88 135/82   BP Location: Right arm    Patient Position: Sitting    BP Method: Large (Manual)    Pulse: 88    SpO2: 95%    Weight: (!) 157 kg (346 lb 2 oz)    Height: 5' 6" (1.676 m)       Physical Exam  Constitutional:       General: She is not in acute distress.     Appearance: She is obese.   Cardiovascular:      Rate and Rhythm: Normal rate and regular rhythm.      Heart sounds: Normal heart sounds. No murmur heard.  Pulmonary:      Effort: Pulmonary effort is normal. No respiratory distress.      Breath sounds: Normal breath sounds. No wheezing, rhonchi or rales.   Skin:     General: Skin is warm and dry.      Findings: Lesion (benign appearing nevi on left lower abd) present.   Neurological:      General: No focal deficit present.      Mental Status: She is alert.   Psychiatric:         Mood and Affect: Mood normal.         Behavior: Behavior normal.         Thought Content: Thought content normal.           Assessment:       1. Arteritis, Takayasu  "   2. Severe obesity (BMI >= 40)        Plan:       Arteritis, Takayasu    Severe obesity (BMI >= 40)    FMLA paperwork completed in the office today.  Her home blood pressure readings are reassuring.  Yulissa is okay to watch.      Medication List with Changes/Refills   Current Medications    ACETAMINOPHEN (TYLENOL) 500 MG TABLET    Take 2 tablets (1,000 mg total) by mouth every 8 (eight) hours as needed for Pain.    ADALIMUMAB (HUMIRA PEN) PNKT INJECTION    Inject 1 pen (40 mg total) into the skin every 14 (fourteen) days.    CALCIUM CARBONATE (TUMS) 200 MG CALCIUM (500 MG) CHEWABLE TABLET    Take 2 tablets (1,000 mg total) by mouth once daily.    DULOXETINE (CYMBALTA) 60 MG CAPSULE    Take 1 capsule (60 mg total) by mouth once daily.    FOLIC ACID (FOLVITE) 1 MG TABLET    Take 1 tablet (1 mg total) by mouth 3 (three) times daily.    METHOTREXATE 25 MG/ML INJECTION    Inject 1 mL (25 mg) into the skin weekly    PROMETHAZINE (PHENERGAN) 25 MG TABLET    TAKE 1 TABLET(25 MG) BY MOUTH EVERY 4 HOURS. NO MORE THAN 4 TABLETS IN 24 HOURS    VITAMIN D (VITAMIN D3) 1000 UNITS TAB    Take 1 tablet (1,000 Units total) by mouth once daily.   Discontinued Medications    GABAPENTIN (NEURONTIN) 100 MG CAPSULE        LORAZEPAM (ATIVAN) 1 MG TABLET    Take 1 tablet (1 mg total) by mouth every 4 (four) hours as needed for Anxiety.    ONDANSETRON (ZOFRAN) 4 MG TABLET    1 to 2 tablets a day for nausea    PANTOPRAZOLE (PROTONIX) 40 MG TABLET    Take 40 mg by mouth once daily.

## 2022-05-20 ENCOUNTER — PROCEDURE VISIT (OUTPATIENT)
Dept: NEUROLOGY | Facility: CLINIC | Age: 42
End: 2022-05-20
Payer: COMMERCIAL

## 2022-05-20 DIAGNOSIS — R20.2 PARESTHESIAS: ICD-10-CM

## 2022-05-20 PROCEDURE — 95886 PR EMG COMPLETE, W/ NERVE CONDUCTION STUDIES, 5+ MUSCLES: ICD-10-PCS | Mod: S$GLB,,, | Performed by: PSYCHIATRY & NEUROLOGY

## 2022-05-20 PROCEDURE — 95886 MUSC TEST DONE W/N TEST COMP: CPT | Mod: S$GLB,,, | Performed by: PSYCHIATRY & NEUROLOGY

## 2022-05-20 PROCEDURE — 95912 PR NERVE CONDUCTION STUDY; 11 -12 STUDIES: ICD-10-PCS | Mod: S$GLB,,, | Performed by: PSYCHIATRY & NEUROLOGY

## 2022-05-20 PROCEDURE — 95912 NRV CNDJ TEST 11-12 STUDIES: CPT | Mod: S$GLB,,, | Performed by: PSYCHIATRY & NEUROLOGY

## 2022-05-23 ENCOUNTER — PATIENT MESSAGE (OUTPATIENT)
Dept: PHARMACY | Facility: CLINIC | Age: 42
End: 2022-05-23
Payer: COMMERCIAL

## 2022-05-26 ENCOUNTER — PATIENT MESSAGE (OUTPATIENT)
Dept: PHARMACY | Facility: CLINIC | Age: 42
End: 2022-05-26
Payer: COMMERCIAL

## 2022-05-30 ENCOUNTER — PATIENT MESSAGE (OUTPATIENT)
Dept: RHEUMATOLOGY | Facility: CLINIC | Age: 42
End: 2022-05-30
Payer: COMMERCIAL

## 2022-05-30 ENCOUNTER — SPECIALTY PHARMACY (OUTPATIENT)
Dept: PHARMACY | Facility: CLINIC | Age: 42
End: 2022-05-30
Payer: COMMERCIAL

## 2022-05-30 NOTE — TELEPHONE ENCOUNTER
Specialty Pharmacy - Refill Coordination    Specialty Medication Orders Linked to Encounter    Flowsheet Row Most Recent Value   Medication #1 adalimumab (HUMIRA PEN) PnKt injection (Order#[id number], Rx#[id number])   Medication #2 methotrexate 25 mg/mL injection (Order#[id number], Rx#[id number])          Refill Questions - Documented Responses    Flowsheet Row Most Recent Value   Patient Availability and HIPAA Verification    Does patient want to proceed with activity? Yes   HIPAA/medical authority confirmed? Yes   Relationship to patient of person spoken to? Self   Refill Screening Questions    Changes to allergies? No   Changes to medications? No   New conditions since last clinic visit? No   Unplanned office visit, urgent care, ED, or hospital admission in the last 4 weeks? No   How does patient/caregiver feel medication is working? Good   Financial problems or insurance changes? No   How many doses of your specialty medications were missed in the last 4 weeks? 0   Would patient like to speak to a pharmacist? No   When does the patient need to receive the medication? 06/04/22   Refill Delivery Questions    How will the patient receive the medication? Delivery Shanae   When does the patient need to receive the medication? 06/04/22   Shipping Address Home   Address in [facility] confirmed and updated if neccessary? Yes   Expected Copay ($) 18.34   Is the patient able to afford the medication copay? Yes   Payment Method CC on file   Days supply of Refill 28   Supplies needed? No supplies needed   Refill activity completed? Yes   Refill activity plan Refill scheduled   Shipment/Pickup Date: 06/01/22          Current Outpatient Medications   Medication Sig    acetaminophen (TYLENOL) 500 MG tablet Take 2 tablets (1,000 mg total) by mouth every 8 (eight) hours as needed for Pain.    adalimumab (HUMIRA PEN) PnKt injection Inject 1 pen (40 mg total) into the skin every 14 (fourteen) days.    calcium carbonate  (TUMS) 200 mg calcium (500 mg) chewable tablet Take 2 tablets (1,000 mg total) by mouth once daily.    DULoxetine (CYMBALTA) 60 MG capsule Take 1 capsule (60 mg total) by mouth once daily.    folic acid (FOLVITE) 1 MG tablet Take 1 tablet (1 mg total) by mouth 3 (three) times daily.    methotrexate 25 mg/mL injection Inject 1 mL (25 mg) into the skin weekly (Patient taking differently: Inject 1 mL (25 mg) into the skin weekly)    promethazine (PHENERGAN) 25 MG tablet TAKE 1 TABLET(25 MG) BY MOUTH EVERY 4 HOURS. NO MORE THAN 4 TABLETS IN 24 HOURS    vitamin D (VITAMIN D3) 1000 units Tab Take 1 tablet (1,000 Units total) by mouth once daily.   Last reviewed on 5/12/2022  9:15 AM by Horacio Erazo MD    Review of patient's allergies indicates:  No Known Allergies Last reviewed on  5/12/2022 9:15 AM by oHracio Erazo      Tasks added this encounter   6/25/2022 - Refill Call (Auto Added)   Tasks due within next 3 months   No tasks due.     Kyara Alexander, Patient Care Assistant  [facility]  [address]  [facility] [address]  Phone: [phone]  Fax: [phone]

## 2022-05-31 NOTE — TELEPHONE ENCOUNTER
Called pts insurance to see if override is available for 2 month supply. Rep stated Insurance needs to know where pt is going, when she is leaving, and when she is coming back in order to review vacation override eligibility.  Called pt to let her know to call [facility] back.  Left detailed VM.

## 2022-05-31 NOTE — TELEPHONE ENCOUNTER
Pt returned call from voicemail that was left. Insurance needed more info for vacation override. Information received from pt and sent to [facility].

## 2022-05-31 NOTE — TELEPHONE ENCOUNTER
Called [facility]- spoke with Radha, insurance rep- 2nd 28 day supply of both MTX and Humira ok for vacation purposes.  Spoke with pt about appropriate transportation. She is aware that insurance often does not replace lost/stolen med if > 1 month supply.  Ok to dispense 2 month supply per supervisor Radha.

## 2022-06-02 ENCOUNTER — LAB VISIT (OUTPATIENT)
Dept: LAB | Facility: HOSPITAL | Age: 42
End: 2022-06-02
Attending: INTERNAL MEDICINE
Payer: COMMERCIAL

## 2022-06-02 DIAGNOSIS — Z13.6 SCREENING FOR CARDIOVASCULAR CONDITION: ICD-10-CM

## 2022-06-02 DIAGNOSIS — M31.4 TAKAYASU'S ARTERITIS: ICD-10-CM

## 2022-06-02 LAB
ALBUMIN SERPL BCP-MCNC: 4.3 G/DL (ref 3.5–5.2)
ALP SERPL-CCNC: 73 U/L (ref 55–135)
ALT SERPL W/O P-5'-P-CCNC: 54 U/L (ref 10–44)
ANION GAP SERPL CALC-SCNC: 10 MMOL/L (ref 8–16)
AST SERPL-CCNC: 28 U/L (ref 10–40)
BASOPHILS # BLD AUTO: 0.03 K/UL (ref 0–0.2)
BASOPHILS NFR BLD: 0.7 % (ref 0–1.9)
BILIRUB SERPL-MCNC: 0.6 MG/DL (ref 0.1–1)
BUN SERPL-MCNC: 7 MG/DL (ref 6–20)
CALCIUM SERPL-MCNC: 9.5 MG/DL (ref 8.7–10.5)
CHLORIDE SERPL-SCNC: 104 MMOL/L (ref 95–110)
CHOLEST SERPL-MCNC: 169 MG/DL (ref 120–199)
CHOLEST/HDLC SERPL: 3.5 {RATIO} (ref 2–5)
CO2 SERPL-SCNC: 24 MMOL/L (ref 23–29)
CREAT SERPL-MCNC: 0.6 MG/DL (ref 0.5–1.4)
CRP SERPL-MCNC: 4.5 MG/L (ref 0–8.2)
DIFFERENTIAL METHOD: NORMAL
EOSINOPHIL # BLD AUTO: 0.1 K/UL (ref 0–0.5)
EOSINOPHIL NFR BLD: 3.1 % (ref 0–8)
ERYTHROCYTE [DISTWIDTH] IN BLOOD BY AUTOMATED COUNT: 13.4 % (ref 11.5–14.5)
ERYTHROCYTE [SEDIMENTATION RATE] IN BLOOD BY WESTERGREN METHOD: 3 MM/HR (ref 0–20)
EST. GFR  (AFRICAN AMERICAN): >60 ML/MIN/1.73 M^2
EST. GFR  (NON AFRICAN AMERICAN): >60 ML/MIN/1.73 M^2
GLUCOSE SERPL-MCNC: 92 MG/DL (ref 70–110)
HCT VFR BLD AUTO: 44.4 % (ref 37–48.5)
HDLC SERPL-MCNC: 48 MG/DL (ref 40–75)
HDLC SERPL: 28.4 % (ref 20–50)
HGB BLD-MCNC: 14.3 G/DL (ref 12–16)
IMM GRANULOCYTES # BLD AUTO: 0.01 K/UL (ref 0–0.04)
IMM GRANULOCYTES NFR BLD AUTO: 0.2 % (ref 0–0.5)
LDLC SERPL CALC-MCNC: 101.6 MG/DL (ref 63–159)
LYMPHOCYTES # BLD AUTO: 1.5 K/UL (ref 1–4.8)
LYMPHOCYTES NFR BLD: 32.8 % (ref 18–48)
MCH RBC QN AUTO: 29.9 PG (ref 27–31)
MCHC RBC AUTO-ENTMCNC: 32.2 G/DL (ref 32–36)
MCV RBC AUTO: 93 FL (ref 82–98)
MONOCYTES # BLD AUTO: 0.5 K/UL (ref 0.3–1)
MONOCYTES NFR BLD: 10.7 % (ref 4–15)
NEUTROPHILS # BLD AUTO: 2.4 K/UL (ref 1.8–7.7)
NEUTROPHILS NFR BLD: 52.5 % (ref 38–73)
NONHDLC SERPL-MCNC: 121 MG/DL
NRBC BLD-RTO: 0 /100 WBC
PLATELET # BLD AUTO: 179 K/UL (ref 150–450)
PMV BLD AUTO: 11.6 FL (ref 9.2–12.9)
POTASSIUM SERPL-SCNC: 4.1 MMOL/L (ref 3.5–5.1)
PROT SERPL-MCNC: 6.8 G/DL (ref 6–8.4)
RBC # BLD AUTO: 4.78 M/UL (ref 4–5.4)
SODIUM SERPL-SCNC: 138 MMOL/L (ref 136–145)
TRIGL SERPL-MCNC: 97 MG/DL (ref 30–150)
WBC # BLD AUTO: 4.48 K/UL (ref 3.9–12.7)

## 2022-06-02 PROCEDURE — 85025 COMPLETE CBC W/AUTO DIFF WBC: CPT | Performed by: INTERNAL MEDICINE

## 2022-06-02 PROCEDURE — 85651 RBC SED RATE NONAUTOMATED: CPT | Mod: PO | Performed by: INTERNAL MEDICINE

## 2022-06-02 PROCEDURE — 36415 COLL VENOUS BLD VENIPUNCTURE: CPT | Mod: PO | Performed by: INTERNAL MEDICINE

## 2022-06-02 PROCEDURE — 86140 C-REACTIVE PROTEIN: CPT | Performed by: INTERNAL MEDICINE

## 2022-06-02 PROCEDURE — 80061 LIPID PANEL: CPT | Performed by: FAMILY MEDICINE

## 2022-06-02 PROCEDURE — 80053 COMPREHEN METABOLIC PANEL: CPT | Performed by: INTERNAL MEDICINE

## 2022-06-13 ENCOUNTER — PATIENT MESSAGE (OUTPATIENT)
Dept: FAMILY MEDICINE | Facility: CLINIC | Age: 42
End: 2022-06-13
Payer: COMMERCIAL

## 2022-06-16 ENCOUNTER — PATIENT MESSAGE (OUTPATIENT)
Dept: FAMILY MEDICINE | Facility: CLINIC | Age: 42
End: 2022-06-16
Payer: COMMERCIAL

## 2022-06-16 ENCOUNTER — OFFICE VISIT (OUTPATIENT)
Dept: RHEUMATOLOGY | Facility: CLINIC | Age: 42
End: 2022-06-16
Payer: COMMERCIAL

## 2022-06-16 ENCOUNTER — OFFICE VISIT (OUTPATIENT)
Dept: NEUROLOGY | Facility: CLINIC | Age: 42
End: 2022-06-16
Payer: COMMERCIAL

## 2022-06-16 VITALS
HEART RATE: 92 BPM | HEIGHT: 65 IN | BODY MASS INDEX: 48.82 KG/M2 | TEMPERATURE: 99 F | SYSTOLIC BLOOD PRESSURE: 119 MMHG | WEIGHT: 293 LBS | DIASTOLIC BLOOD PRESSURE: 85 MMHG

## 2022-06-16 VITALS
DIASTOLIC BLOOD PRESSURE: 99 MMHG | HEIGHT: 66 IN | BODY MASS INDEX: 47.09 KG/M2 | SYSTOLIC BLOOD PRESSURE: 157 MMHG | HEART RATE: 101 BPM | WEIGHT: 293 LBS

## 2022-06-16 DIAGNOSIS — R20.2 PARESTHESIAS: ICD-10-CM

## 2022-06-16 DIAGNOSIS — M31.4 TAKAYASU'S ARTERITIS: Primary | ICD-10-CM

## 2022-06-16 PROCEDURE — 3080F PR MOST RECENT DIASTOLIC BLOOD PRESSURE >= 90 MM HG: ICD-10-PCS | Mod: CPTII,S$GLB,, | Performed by: PSYCHIATRY & NEUROLOGY

## 2022-06-16 PROCEDURE — 1159F MED LIST DOCD IN RCRD: CPT | Mod: CPTII,S$GLB,, | Performed by: PSYCHIATRY & NEUROLOGY

## 2022-06-16 PROCEDURE — 99999 PR PBB SHADOW E&M-EST. PATIENT-LVL III: CPT | Mod: PBBFAC,,, | Performed by: PSYCHIATRY & NEUROLOGY

## 2022-06-16 PROCEDURE — 99214 PR OFFICE/OUTPT VISIT, EST, LEVL IV, 30-39 MIN: ICD-10-PCS | Mod: S$GLB,,, | Performed by: INTERNAL MEDICINE

## 2022-06-16 PROCEDURE — 1160F RVW MEDS BY RX/DR IN RCRD: CPT | Mod: CPTII,S$GLB,, | Performed by: INTERNAL MEDICINE

## 2022-06-16 PROCEDURE — 1159F MED LIST DOCD IN RCRD: CPT | Mod: CPTII,S$GLB,, | Performed by: INTERNAL MEDICINE

## 2022-06-16 PROCEDURE — 99214 OFFICE O/P EST MOD 30 MIN: CPT | Mod: S$GLB,,, | Performed by: INTERNAL MEDICINE

## 2022-06-16 PROCEDURE — 99999 PR PBB SHADOW E&M-EST. PATIENT-LVL IV: ICD-10-PCS | Mod: PBBFAC,,, | Performed by: INTERNAL MEDICINE

## 2022-06-16 PROCEDURE — 3008F BODY MASS INDEX DOCD: CPT | Mod: CPTII,S$GLB,, | Performed by: PSYCHIATRY & NEUROLOGY

## 2022-06-16 PROCEDURE — 3074F SYST BP LT 130 MM HG: CPT | Mod: CPTII,S$GLB,, | Performed by: INTERNAL MEDICINE

## 2022-06-16 PROCEDURE — 3074F PR MOST RECENT SYSTOLIC BLOOD PRESSURE < 130 MM HG: ICD-10-PCS | Mod: CPTII,S$GLB,, | Performed by: INTERNAL MEDICINE

## 2022-06-16 PROCEDURE — 99999 PR PBB SHADOW E&M-EST. PATIENT-LVL IV: CPT | Mod: PBBFAC,,, | Performed by: INTERNAL MEDICINE

## 2022-06-16 PROCEDURE — 99214 PR OFFICE/OUTPT VISIT, EST, LEVL IV, 30-39 MIN: ICD-10-PCS | Mod: S$GLB,,, | Performed by: PSYCHIATRY & NEUROLOGY

## 2022-06-16 PROCEDURE — 3079F DIAST BP 80-89 MM HG: CPT | Mod: CPTII,S$GLB,, | Performed by: INTERNAL MEDICINE

## 2022-06-16 PROCEDURE — 99999 PR PBB SHADOW E&M-EST. PATIENT-LVL III: ICD-10-PCS | Mod: PBBFAC,,, | Performed by: PSYCHIATRY & NEUROLOGY

## 2022-06-16 PROCEDURE — 1159F PR MEDICATION LIST DOCUMENTED IN MEDICAL RECORD: ICD-10-PCS | Mod: CPTII,S$GLB,, | Performed by: INTERNAL MEDICINE

## 2022-06-16 PROCEDURE — 3077F PR MOST RECENT SYSTOLIC BLOOD PRESSURE >= 140 MM HG: ICD-10-PCS | Mod: CPTII,S$GLB,, | Performed by: PSYCHIATRY & NEUROLOGY

## 2022-06-16 PROCEDURE — 1159F PR MEDICATION LIST DOCUMENTED IN MEDICAL RECORD: ICD-10-PCS | Mod: CPTII,S$GLB,, | Performed by: PSYCHIATRY & NEUROLOGY

## 2022-06-16 PROCEDURE — 3008F BODY MASS INDEX DOCD: CPT | Mod: CPTII,S$GLB,, | Performed by: INTERNAL MEDICINE

## 2022-06-16 PROCEDURE — 99214 OFFICE O/P EST MOD 30 MIN: CPT | Mod: S$GLB,,, | Performed by: PSYCHIATRY & NEUROLOGY

## 2022-06-16 PROCEDURE — 3077F SYST BP >= 140 MM HG: CPT | Mod: CPTII,S$GLB,, | Performed by: PSYCHIATRY & NEUROLOGY

## 2022-06-16 PROCEDURE — 3079F PR MOST RECENT DIASTOLIC BLOOD PRESSURE 80-89 MM HG: ICD-10-PCS | Mod: CPTII,S$GLB,, | Performed by: INTERNAL MEDICINE

## 2022-06-16 PROCEDURE — 3008F PR BODY MASS INDEX (BMI) DOCUMENTED: ICD-10-PCS | Mod: CPTII,S$GLB,, | Performed by: PSYCHIATRY & NEUROLOGY

## 2022-06-16 PROCEDURE — 3008F PR BODY MASS INDEX (BMI) DOCUMENTED: ICD-10-PCS | Mod: CPTII,S$GLB,, | Performed by: INTERNAL MEDICINE

## 2022-06-16 PROCEDURE — 3080F DIAST BP >= 90 MM HG: CPT | Mod: CPTII,S$GLB,, | Performed by: PSYCHIATRY & NEUROLOGY

## 2022-06-16 PROCEDURE — 1160F PR REVIEW ALL MEDS BY PRESCRIBER/CLIN PHARMACIST DOCUMENTED: ICD-10-PCS | Mod: CPTII,S$GLB,, | Performed by: INTERNAL MEDICINE

## 2022-06-16 RX ORDER — METHOTREXATE 25 MG/ML
INJECTION, SOLUTION INTRA-ARTERIAL; INTRAMUSCULAR; INTRAVENOUS
Qty: 10 ML | Refills: 1 | Status: SHIPPED | OUTPATIENT
Start: 2022-06-16 | End: 2022-10-24

## 2022-06-16 RX ORDER — NAPROXEN SODIUM 220 MG/1
81 TABLET, FILM COATED ORAL DAILY
COMMUNITY

## 2022-06-16 RX ORDER — PREGABALIN 25 MG/1
25 CAPSULE ORAL 2 TIMES DAILY PRN
Qty: 180 CAPSULE | Refills: 1 | Status: SHIPPED | OUTPATIENT
Start: 2022-06-16 | End: 2022-07-26

## 2022-06-16 RX ORDER — PANTOPRAZOLE SODIUM 40 MG/1
40 TABLET, DELAYED RELEASE ORAL DAILY
COMMUNITY
Start: 2022-06-12 | End: 2022-06-16

## 2022-06-16 RX ORDER — DULOXETIN HYDROCHLORIDE 60 MG/1
60 CAPSULE, DELAYED RELEASE ORAL 2 TIMES DAILY
Qty: 180 CAPSULE | Refills: 3 | Status: SHIPPED | OUTPATIENT
Start: 2022-06-16 | End: 2023-03-16

## 2022-06-16 ASSESSMENT — ROUTINE ASSESSMENT OF PATIENT INDEX DATA (RAPID3)
FATIGUE SCORE: 6
PATIENT GLOBAL ASSESSMENT SCORE: 1.5
TOTAL RAPID3 SCORE: 1.22
PSYCHOLOGICAL DISTRESS SCORE: 0
MDHAQ FUNCTION SCORE: 0.2
AM STIFFNESS SCORE: 0, NO
PAIN SCORE: 1.5

## 2022-06-16 NOTE — PROGRESS NOTES
[facility] - NEUROLOGY 7TH [facility], [place]    Date: June 16, 2022   Patient Name: Kelly Dove   MRN: [id number]   PCP: Horacio Erazo  Referring Provider: No ref. provider found    Assessment:      This is Kelly Dove, 42 y.o. female with recent diagnosis of Tayakasu arteritis and diffuse paresthesias - MRI brain and Cspine 4/2022 normal and EMG 5/2022 without large fiber neuropathy, discussed punch biopsy but patient chooses to defer as would not alter treatment options     Plan:      -  PGB 25mg bid prn  -  Increase cymbalta to 60mg bid    Follow up 3 months       Greater than 30 minutes spent in chart review, documentation, independent review of imaging, and face to face time with patient    I discussed side effects of the medications. I asked the patient to stop the medication if she notices serious adverse effects as we discussed and to seek immediate medical attention at an ER.     Braden Palmer MD  [facility]   Department of Neurology    Subjective:     -  Partial improvement in paresthesias with increased cymbalta but still prominent enough to interfere with sleep, some ongoing difficulty with fine motor ability, improvement in balance       HPI 3/2022:   Ms. Kelly Dove is a 42 y.o. 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  She was started on MTX and prednisone taper which she has completed and started Humira earlier this months.    Shortly after discharge she developed diffuse paresthesias in the hands, feet, and face - these have not subsided thus far despite current treatment regiment of underlying disease.  She was prescribed GBP  which did not alleviate symptoms and led to development of LE edema.  She was started on cymbalta about two months ago which has not alleviated symptoms.      PAST MEDICAL HISTORY:  No past medical history on file.    PAST SURGICAL HISTORY:  Past Surgical History:   Procedure Laterality Date    ABDOMINAL SURGERY      BREAST BIOPSY      HERNIA REPAIR      HYSTERECTOMY      OOPHORECTOMY      TONSILLECTOMY         CURRENT MEDS:  Current Outpatient Medications   Medication Sig Dispense Refill    acetaminophen (TYLENOL) 500 MG tablet Take 2 tablets (1,000 mg total) by mouth every 8 (eight) hours as needed for Pain. 42 tablet 0    adalimumab (HUMIRA PEN) PnKt injection Inject 1 pen (40 mg total) into the skin every 14 (fourteen) days. 2 pen 11    aspirin 81 MG Chew Take 81 mg by mouth once daily.      calcium carbonate (TUMS) 200 mg calcium (500 mg) chewable tablet Take 2 tablets (1,000 mg total) by mouth once daily. 60 tablet 11    methotrexate 25 mg/mL injection Inject 1 mL (25 mg) into the skin weekly (Patient taking differently: Inject 1 mL (25 mg) into the skin weekly) 10 mL 1    promethazine (PHENERGAN) 25 MG tablet TAKE 1 TABLET(25 MG) BY MOUTH EVERY 4 HOURS. NO MORE THAN 4 TABLETS IN 24 HOURS 60 tablet 0    vitamin D (VITAMIN D3) 1000 units Tab Take 1 tablet (1,000 Units total) by mouth once daily. 30 tablet 2    DULoxetine (CYMBALTA) 60 MG capsule Take 1 capsule (60 mg total) by mouth 2 (two) times daily. 180 capsule 3    folic acid (FOLVITE) 1 MG tablet Take 1 tablet (1 mg total) by mouth 3 (three) times daily. 90 tablet 4    pregabalin (LYRICA) 25 MG capsule Take 1 capsule (25 mg total) by mouth 2 (two) times daily as needed (burning and tingling). 180 capsule 1     No current facility-administered medications for this visit.       ALLERGIES:  Review of patient's allergies indicates:  No Known Allergies    FAMILY HISTORY:  Family History   Problem Relation Age of Onset    Ovarian cancer Mother   "      SOCIAL HISTORY:  Social History     Tobacco Use    Smoking status: Current Every Day Smoker    Smokeless tobacco: Never Used   Substance Use Topics    Alcohol use: Yes     Comment: social    Drug use: Not Currently       Review of Systems:  12 review of systems is negative except for the symptoms mentioned in HPI.        Objective:     Vitals:    06/16/22 1043   BP: (!) 157/99   BP Location: Right arm   Patient Position: Sitting   BP Method: Medium (Automatic)   Pulse: 101   Weight: (!) 157 kg (346 lb 2 oz)   Height: 5' 6" (1.676 m)       General: NAD, well nourished   Eyes: no tearing, discharge, no erythema   ENT: moist mucous membranes of the oral cavity, nares patent    Neck: Supple, full range of motion  Cardiovascular: Warm and well perfused, pulses decreased in right versus left in upper more than lower extremity  Lungs: Normal work of breathing, normal chest wall excursions  Skin: No rash, lesions, or breakdown on exposed skin  Psychiatry: Mood and affect are appropriate   Abdomen: soft, non tender, non distended  Extremeties: No cyanosis, clubbing or edema.    Neurological   MENTAL STATUS: Alert and oriented to person, place, and time. Attention and concentration within normal limits. Speech without dysarthria, able to name and repeat without difficulty. Recent and remote memory within normal limits   CRANIAL NERVES: Visual fields intact. PERRL. EOMI. Facial sensation intact. Face symmetrical. Hearing grossly intact. Full shoulder shrug bilaterally. Tongue protrudes midline   SENSORY: Sensation is intact to light touch  MOTOR: Normal bulk and tone. No pronator drift.  Mild weakness in right hand intrinsics  REFLEXES: DTR quiet throughout  CEREBELLAR/COORDINATION/GAIT: Gait steady with normal arm swing and stride length.  Finger to nose intact. Normal rapid alternating movements.         "

## 2022-06-16 NOTE — PROGRESS NOTES
Rapid3 Question Responses and Scores 6/16/2022   MDHAQ Score 0.2   Psychologic Score 0   Pain Score 1.5   When you awakened in the morning OVER THE LAST WEEK, did you feel stiff? No   Fatigue Score 6   Global Health Score 1.5   RAPID3 Score 1.22     Answers for HPI/ROS submitted by the patient on 6/16/2022  fever: No  eye redness: No  mouth sores: No  headaches: Yes  shortness of breath: No  chest pain: Yes  trouble swallowing: No  diarrhea: No  constipation: No  unexpected weight change: No  genital sore: No  dysuria: No  During the last 3 days, have you had a skin rash?: No  Bruises or bleeds easily: Yes  cough: No

## 2022-06-16 NOTE — PROGRESS NOTES
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 The right median antidromic sensory and orthodromic motor evaluations are within normal limits.     Small fibre neuropathy- discussed by neurology- she defers to a later date    Neurology- increased cymbalta to 60 mg bid  lyrica 25 mg bid prn as needed offered    Vit b12 : 345  b1 nml  CHAR,SSA,SSB neg  TSH nml    BP- one arm 119/85 and the other arm 152/97    No gain in weight  She has lost 10 pounds and then she gained some back    Vascular- 3/2022   has seen her- surgery not an option  He thinks every 18 months follow up with PPGs,CAROTID AND SUBCLAVIAN US- will be sufficient    On aspirin 81 mg daily  Trying to quit smoking      2/2022    Whole body pain  covid feb 1rst- 9 days- sinus stuffiness, fatigue    2/10    CRP 13.3  ESR nml    We missed mtx for 10 days due to covid and the CRP might be high due to the covid and not being on mtx    On mtx 9 pills weekly  Folic acid daily    On prednisone 5 mg until she got covid  Now off it    BP today 161/104    Sleep study today  Neurology appointment pending    Many scans were done in 12/2021      VAS US arterial arms    Rt thumb doppler waveform - PPG:   minimally dampened   Rt index finger doppler waveform - PPG:    minimally dampened   Rt middle finger doppler waveform - PPG:   minimally dampened   Rt ring finger doppler waveform - PPG:  minimally dampened   Rt little finger doppler waveform - PPG:   minimally dampened   Lt thumb doppler waveform - PPG:   Within normal limits   Lt index finger doppler waveform - PPG:    Within normal limits   Lt middle finger doppler waveform - PPG:   Within normal limits   Lt ring finger doppler waveform - PPG: Within normal limits   Lt little finger doppler waveform - PPG:   Within normal limits     VAS US carotids b/l    RIGHT SIDE:   Normal - No evidence of plaque in the right internal carotid artery.   Antegrade flow in the right vertebral artery.   The right Subclavian artery is not visualized, suggestive of possible vessel occlusion. The Axillary and prox Brachial arteries appear   patent without a significant stenosis.     LEFT SIDE:   Normal - No evidence of plaque in the left internal carotid artery.   Antegrade flow in the left vertebral artery.   The left Subclavian and Axillary arteries appear patent without a significant stenosis.       VAS US CARLOS    Right Leg: Segmental pressures may be unreliable due to suspected medial calcinosis; however, PVR waveforms suggest no   evidence of significant peripheral arterial occlusive disease.   - Rt Great Toe: The PPG waveform as described above. - TBI of 1.2 with a toe pressure of 157 mmHg is noted.     Left Leg: Segmental pressures may be unreliable due to suspected medial calcinosis; however, PVR waveforms suggest minimal   femoral peripheral arterial occlusive disease.   - Lt Great Toe: The PPG waveform as described above. - TBI of 1.24 with a toe pressure of 162 mmHg is noted.     -Brachial pressures differ by greater than 20 mmHg.       12/2021    She has been calling me with the following complaints :     Left facial swelling  Left arm swelling  Left hand and wrist swelling  Left ankle swelling  She sleeps on both sides    The swelling is random    Left facial twitching   Numbness in the hands  Right toe tingles    On taper of prednisone,now on 10 mg-for 3  days now  Prior to that we did 40/30/20  Cramps in the legs    On mtx 7 tabs weekly,folic acid  Plan is to maximise mtx to 9 pills weekly /folic acid     She is very tired    ESR,CRP nml      41 year old white female presented with      Chest pains-mid September-went to the hospital  EKG abnormal-nonspecific ST segment in inferolateral leads  Admitted     Right subclavian artery occluded    Stress test-decreased LAD territory  ECHO : Gradient across aortic valve nml  EF nml  Grade 1 diastolic dysfunction    Heart fine,no blockages  Cholesterol normal  a1c normal  Triglycerides normal    Vascular surgeon -has evaluated her    ESR nml  CRP elevated 4.41    Blood cultures negative    CMP nml  CBC nml  TSH nml    Carotid artery -CTA-Right subclavian artery occluded   Aortic root shoot-showed occlusion of right subclavian artery after right coomon carotid artery origin    There is complete occlusion from the origin of the subclavian artery to the axilla     Symptoms     Fatigue  Night sweats  Feels feverish  Weight gain-40 pounds  Sob  Dizziness  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  Arms ache when she showers  When she gets up she becomes dizzy  Hands are hurting  Chronic headaches-now severe-not responding to ibuprofen    Infact she has 2 year h/o right hand numbness pain and swelling after combing hair and significant work using this hand       Right eye-blurry vision-waves bottom right field  No eye exam recently    Abdominal pain-new -with eating within 10 to 15 minutes-lower abdomen    She had abdominal infection s/p 3rd delivery  She had hysterectomy,saplingo-oophorectomy   3 hernia surgeries   Chronic diarrhea-with eating     Past history : none    Family history : cervical and ovarian cancer  Sister RA  Son JEFFREY  Son severe asthma,food allergies,eczema   Aunt federico dickenss  Sister-spontaneous coronary dissection s/p delivery     Social history : current smoker,not an alcoholic        Review of Systems    Constitutional: Positive for fever. Negative for unexpected weight change.   HENT: Negative for mouth sores and trouble swallowing.    Eyes: Negative for redness.   Respiratory: Positive for shortness of breath. Negative for cough.    Cardiovascular: Positive for chest pain.   Gastrointestinal: Negative for constipation and diarrhea.   Genitourinary: Negative for dysuria and genital sores.   Skin: Negative for rash.   Neurological: Negative for headaches.   Hematological: Does not bruise/bleed easily.            No skin rashes,malar rash,photosensitivity   No telangiectasias   No calcinosis   No psoriasis   No patchy alopecia   No oral and nasal ulcers   No dry eyes and dry mouth   No dysphagia,diplopia and dysphonia and muscle weakness   No n/v/c   No acid reflux+   No raynaud's+   No digital ulcers   No cytopenias   No renal issues   No blood clots   No weight loss and loss of appetite   1 miscarriage-molar pregnancy  No recurrent conjunctivitis or uveitis or scleritis or episcleritis   No chronic or bloody diarrhea with no u colitis or crohn's /inflammatory bowel disease   No vaginal or  urethral  d/c/STDs/no ulcers   No unexplained lymphadenopathy,parotitis   No seizures,strokes,psychosis  No sclerodactyly  No puffy hands  No perioral tightness       Physical Exam   Constitutional: She is oriented to person, place, and time. No distress.   HENT:   Head: Normocephalic.   Mouth/Throat: Oropharynx is clear and moist.   Eyes: Pupils are equal, round, and reactive to light. Conjunctivae are normal. Right eye exhibits no discharge. Left eye exhibits no discharge. No scleral icterus.   Neck: No thyromegaly present.   Cardiovascular: Normal rate, regular rhythm and normal heart sounds.   Pulmonary/Chest: Effort normal and breath sounds normal. No stridor.   Abdominal: Soft. Bowel sounds are normal.   Musculoskeletal:         General: Normal range of motion.      Cervical back: Normal range of motion.    Lymphadenopathy:     She has no cervical adenopathy.   Neurological: She is alert and oriented to person, place, and time.   Skin: Skin is warm. No rash noted. She is not diaphoretic.   Psychiatric: Affect and judgment normal.         Laboratory abnormalities    nml CBC,CMP  ESR nml  CRP 9.1  UA no blood or protein  nml troponins  nml BNP  nml hb11c  nml CMP  nml mag and phos  Elevated IL-6  Pre dmard panel neg    Initial imaging studies     CTA chest noncoronary    No pulmonary thromboembolism.  There is high-grade stenosis of the right subclavian artery approximately 2.5 cm from its origin in this patient with reported history of the same.  Correlation is advised.  No acute cardiopulmonary process.  Possible hepatic steatosis, correlation with LFTs recommended.      CTA abdomen and pelvis  Partially calcified splenic artery aneurysm at the level of the hilum. Otherwise, the abdominopelvic arteries are patent and normal in caliber.       CTA head and neck  1. No acute intracranial findings.  2. CTA head and neck without large vessel occlusion or high-grade stenosis.  3. Additional findings as above.    ECHO    · The left ventricle is normal in size with low normal systolic function. The estimated ejection fraction is 50%.  · Normal right ventricular size with normal right ventricular systolic function.  · Normal left ventricular diastolic function.  · Normal central venous pressure (3 mmHg).          Assessment     Patient is a 41 year old female with  Psvlvtqrsf-[place]if[place]- ancestry-  Comes in with 2 year h/o right arm claudication  Right arm goes numb when she does too much!!  This has progressed over the past 2 years and she now has very limited use of her right arm/cannot brush her hair/cannot shower using this arm for overhead activities    In addition she is     Short of breath  She cannot walk short distances    Has right eye I/M loss of vision right lower field    Postprandial abdominal  pain    Dizziness and orthostasis     Labs show elevated cardiac CRP 4.4  nml ESR  CTA-Complete occlusion of right subclavian artery    Other risk factors r./corin  Definitely long term smoking-can put her at high risk for vascular disease  But she doesn't seem to be a diabetic,hypertensive  HDL,LDL ok,triglycerides 210? 110? Discrepancy noted    I admitted her with the intent to get   rpt ESR,CRP  MRA-chest,abdomen,pelvis  Brain and neck  Eye exam  Vascular surgery to kindly look at her asap-assess need for revascularisation    Should we immunosuppress? Steroids +/- TNFs-depends on if there is activity as noted on MRI  Not much data on IL-6 inhibitors    GI -eval -is the postprandial pain from occlusion in an artery ? Or given the multiple surgeries are we dealing with some obstruction etc?    CT abdomen-pelvis in addition to MRA might help    It so happened that she had CTA chest and she has high grade stenosis of the R subclavian artery. Vascular surgery felt that the stenosis was chronic due to collaterals and signed off.   Optho also saw the patient and did not note any abnormalities on exam.   CTA of abdomen/pelvis showed partially calcified splenic artery aneurysm at the level of the hilum.   CTA head/neck had no without large vessel occlusion or high-grade stenosis.    She was started on 40 mg prednisone with PJP prophylaxis on 10/30/2021    We saw her in the clinic-11/15/2021  We suggested steroid tapering the prednisone   We started mtx 5 pills weekly with a slow titration    She then came to me on mtx 9 pills weekly,folic acid  Humira was not approved yet    BP was high  She had abnormal CARLOS-   She didn't feel food  She was hurting and also swollen    We then got humira approved  She switched mtx to 25 mg sc weekly injection     I wrote a note to      SINCE I R/CORIN the recent imaging   I only knew of her right subclavian artery stenosis   But I see that there is a  VAS-US- CARLOS showing  Right Leg:  Segmental pressures may be unreliable due to suspected medial calcinosis; however, PVR waveforms suggest no   evidence of significant peripheral arterial occlusive disease.   - Rt Great Toe: The PPG waveform as described above. - TBI of 1.2 with a toe pressure of 157 mmHg is noted.   Left Leg: Segmental pressures may be unreliable due to suspected medial calcinosis; however, PVR waveforms suggest minimal   femoral peripheral arterial occlusive disease.   - Lt Great Toe: The PPG waveform as described above. - TBI of 1.24 with a toe pressure of 162 mmHg is noted.   -Brachial pressures differ by greater than 20 mmHg.   I dont see another CARLOS on the system to compare this to  There is however a CTA abdomen and pelvis when she was first seen by us and admitted   Based on the data we have is there anyway we can tell if the lower extremity lesions are a progression of the disease? Or this might have been there all along but we didn't know    He wrote back to me :     The lower extremity perfusion is normally - luckily for her; those are normal ABIs and waveforms.     Great to add humira in future for better medical therapy and immunomdulation   No need for surgery now          6/2022    Overall she doesn't feel good when she does mtx+ humira at the same time  Off steroids completely     ALT 54  AST nml  CBC,CRP,ESR nml    She is taking some time off work- she feels much better  She thinks rest and staying away from work is helping    She has undergone extensive work up for hand and arm paresthesias and she has a normal MRI brain,C spine has deg arthritis,she has right carpal tunnel syndrome and skin biopsy for small fibre neuropathy has been postponed to a later date    Today BP- one arm 119/85 and the other arm 152/97  No gain in weight  She has lost 10 pounds and then she gained some back    Vascular- 3/2022   has seen her- surgery not an option  He thinks every 18 months follow up with PPGs,CAROTID AND  SUBCLAVIAN US- will be sufficient     On aspirin 81 mg daily  Trying to quit smoking        1. Takayasu's arteritis                Crystal was seen today for disease management.    Diagnoses and all orders for this visit:    Takayasu's arteritis  -     CBC Auto Differential; Future  -     Comprehensive Metabolic Panel; Future  -     Sedimentation rate; Future  -     C-Reactive Protein; Future    Other orders  -     methotrexate 25 mg/mL injection; Inject 0.8 ml weekly        Plan    humira to continue    mtx can be tapered to 0.8 ml weekly  Folic acid 3 tabs daily     Every 3 months- labs and taper mtx slowly    Sleep study-mild sleep apnea- CPAP-optional    On cymbalta 60 mg daily- will increase to 60 mg bid     Vaccines  covid vaccine x 2  Booster needed  Flu shot  Pneumonia shots    Labs and rtc in 3 months

## 2022-06-16 NOTE — PATIENT INSTRUCTIONS
INCREASE CYMBALTA TO 1 CAP (60MG) TWICE DAILY    TAKE LYRICA AS NEEDED, YOU WILL BE CONTACTED ONCE INSURANCE APPROVAL IS OBTAINED

## 2022-06-21 ENCOUNTER — DOCUMENTATION ONLY (OUTPATIENT)
Dept: FAMILY MEDICINE | Facility: CLINIC | Age: 42
End: 2022-06-21
Payer: COMMERCIAL

## 2022-06-22 ENCOUNTER — SPECIALTY PHARMACY (OUTPATIENT)
Dept: PHARMACY | Facility: CLINIC | Age: 42
End: 2022-06-22
Payer: COMMERCIAL

## 2022-06-22 DIAGNOSIS — R20.2 PARESTHESIAS: ICD-10-CM

## 2022-06-22 NOTE — TELEPHONE ENCOUNTER
Methotrexate vial    Called patient, left message, need to discuss dose decrease from 1 ml weekly to 0.8 ml weekly and also see if a refill is needed at this time.    No PA needed, copay $0 on test claim

## 2022-06-24 NOTE — TELEPHONE ENCOUNTER
Specialty Pharmacy - Clinical Intervention  Specialty Pharmacy - Refill Coordination    Specialty Medication Orders Linked to Encounter    Flowsheet Row Most Recent Value   Medication #1 methotrexate 25 mg/mL injection (Order#[id number], Rx#)              Current Outpatient Medications   Medication Sig    acetaminophen (TYLENOL) 500 MG tablet Take 2 tablets (1,000 mg total) by mouth every 8 (eight) hours as needed for Pain.    adalimumab (HUMIRA PEN) PnKt injection Inject 1 pen (40 mg total) into the skin every 14 (fourteen) days.    aspirin 81 MG Chew Take 81 mg by mouth once daily.    calcium carbonate (TUMS) 200 mg calcium (500 mg) chewable tablet Take 2 tablets (1,000 mg total) by mouth once daily.    DULoxetine (CYMBALTA) 60 MG capsule Take 1 capsule (60 mg total) by mouth 2 (two) times daily.    folic acid (FOLVITE) 1 MG tablet Take 1 tablet (1 mg total) by mouth 3 (three) times daily.    methotrexate 25 mg/mL injection Inject 0.8 ml weekly    pregabalin (LYRICA) 25 MG capsule Take 1 capsule (25 mg total) by mouth 2 (two) times daily as needed (burning and tingling). (Patient not taking: Reported on 6/16/2022)    promethazine (PHENERGAN) 25 MG tablet TAKE 1 TABLET(25 MG) BY MOUTH EVERY 4 HOURS. NO MORE THAN 4 TABLETS IN 24 HOURS (Patient not taking: Reported on 6/16/2022)    vitamin D (VITAMIN D3) 1000 units Tab Take 1 tablet (1,000 Units total) by mouth once daily.   Last reviewed on 6/24/2022  9:16 AM by Cesia Rowland PharmD    Review of patient's allergies indicates:  No Known Allergies Last reviewed on  6/16/2022 11:38 AM by Corinne Cundiff    Interventions added this encounter   Open: [facility] Patient Intervention - Patient educational resources: methotrexate 25 mg/mL injection     Tasks added this encounter   No tasks added.   Tasks due within next 3 months   7/22/2022 - Refill Call (Auto Added)     Cesia Rowland, PharmD  [facility]  [address] [place] [address]  [address]  Phone: [phone]  Fax: [phone]

## 2022-06-24 NOTE — TELEPHONE ENCOUNTER
Patient has enough medication for June and July, next dose due is august, a refill call is set up for 7/22, patient is aware of this and ok with this timeframe

## 2022-06-28 ENCOUNTER — PATIENT MESSAGE (OUTPATIENT)
Dept: FAMILY MEDICINE | Facility: CLINIC | Age: 42
End: 2022-06-28
Payer: COMMERCIAL

## 2022-06-28 NOTE — TELEPHONE ENCOUNTER
Spoke with patient. She would like to extend her leave to the full 12 weeks. She also needed the correct diagnosis ( auto-immune disease).

## 2022-07-04 ENCOUNTER — PATIENT MESSAGE (OUTPATIENT)
Dept: RHEUMATOLOGY | Facility: CLINIC | Age: 42
End: 2022-07-04
Payer: COMMERCIAL

## 2022-07-15 ENCOUNTER — TELEPHONE (OUTPATIENT)
Dept: RHEUMATOLOGY | Facility: CLINIC | Age: 42
End: 2022-07-15
Payer: COMMERCIAL

## 2022-07-15 NOTE — TELEPHONE ENCOUNTER
Called and confirmed receipt and faxed the paperwork to the disability office for processing    ----- Message from Greta Hernandez sent at 7/15/2022  2:18 PM CDT -----  Contact: [facility] [employer] requesting a callback to see if you received clinical letter         Confirmed contact below:  Contact Name:the [facility]  Phone Number: [phone]   Margarita

## 2022-07-18 ENCOUNTER — PATIENT MESSAGE (OUTPATIENT)
Dept: FAMILY MEDICINE | Facility: CLINIC | Age: 42
End: 2022-07-18
Payer: COMMERCIAL

## 2022-07-22 ENCOUNTER — PATIENT MESSAGE (OUTPATIENT)
Dept: PHARMACY | Facility: CLINIC | Age: 42
End: 2022-07-22
Payer: COMMERCIAL

## 2022-07-24 ENCOUNTER — PATIENT MESSAGE (OUTPATIENT)
Dept: RHEUMATOLOGY | Facility: CLINIC | Age: 42
End: 2022-07-24
Payer: COMMERCIAL

## 2022-07-25 ENCOUNTER — SPECIALTY PHARMACY (OUTPATIENT)
Dept: PHARMACY | Facility: CLINIC | Age: 42
End: 2022-07-25
Payer: COMMERCIAL

## 2022-07-25 NOTE — TELEPHONE ENCOUNTER
Incoming call from patient requesting refills of Humira and methotrexate. Patient reports that provider changed methotrexate dose to 0.9 mL weekly due to increased symptoms at dose of 0.8 mL weekly. Advised that current prescription does not reflect that dose - Rx is currently written for 0.8 mL weekly. Will reach out to provider to authorize updating the sig and will follow up with patient.     Secure chat message sent to Dr. Rea who approved updating the sig to reflect dose of 0.9 mL weekly. Rx updated in wamb.

## 2022-07-26 DIAGNOSIS — R20.2 PARESTHESIAS: ICD-10-CM

## 2022-07-26 RX ORDER — PREGABALIN 50 MG/1
50 CAPSULE ORAL 2 TIMES DAILY PRN
Qty: 60 CAPSULE | Refills: 5 | Status: SHIPPED | OUTPATIENT
Start: 2022-07-26 | End: 2022-10-17

## 2022-07-27 NOTE — TELEPHONE ENCOUNTER
Specialty Pharmacy - Refill Coordination  Specialty Pharmacy - Clinical Intervention    Specialty Medication Orders Linked to Encounter    Flowsheet Row Most Recent Value   Medication #1 adalimumab (HUMIRA PEN) PnKt injection (Order#[id number], Rx#[id number])   Medication #2 methotrexate 25 mg/mL injection (Order#[id number], Rx#[id number])          Refill Questions - Documented Responses    Flowsheet Row Most Recent Value   Patient Availability and HIPAA Verification    Does patient want to proceed with activity? Yes   HIPAA/medical authority confirmed? Yes   Relationship to patient of person spoken to? Self   Refill Screening Questions    Changes to allergies? No   Changes to medications? Yes  [increase in mtx dose and lyrica dose]   New conditions since last clinic visit? No   Unplanned office visit, urgent care, ED, or hospital admission in the last 4 weeks? No   How does patient/caregiver feel medication is working? Good   Financial problems or insurance changes? No   How many doses of your specialty medications were missed in the last 4 weeks? 0   Would patient like to speak to a pharmacist? No   When does the patient need to receive the medication? 07/29/22   Refill Delivery Questions    How will the patient receive the medication? Delivery Shanae   When does the patient need to receive the medication? 07/29/22   Shipping Address Home   Address in [facility] confirmed and updated if neccessary? Yes   Expected Copay ($) 0   Is the patient able to afford the medication copay? Yes   Payment Method zero copay   Days supply of Refill 28   Supplies needed? No supplies needed   Refill activity completed? Yes   Refill activity plan Refill scheduled   Shipment/Pickup Date: 07/27/22          Current Outpatient Medications   Medication Sig    acetaminophen (TYLENOL) 500 MG tablet Take 2 tablets (1,000 mg total) by mouth every 8 (eight) hours as needed for Pain.    adalimumab (HUMIRA PEN) PnKt injection Inject 1  pen (40 mg total) into the skin every 14 (fourteen) days.    aspirin 81 MG Chew Take 81 mg by mouth once daily.    calcium carbonate (TUMS) 200 mg calcium (500 mg) chewable tablet Take 2 tablets (1,000 mg total) by mouth once daily.    DULoxetine (CYMBALTA) 60 MG capsule Take 1 capsule (60 mg total) by mouth 2 (two) times daily.    folic acid (FOLVITE) 1 MG tablet Take 1 tablet (1 mg total) by mouth 3 (three) times daily.    methotrexate 25 mg/mL injection Inject 0.9 mL into the skin weekly    pregabalin (LYRICA) 50 MG capsule Take 1 capsule (50 mg total) by mouth 2 (two) times daily as needed (burning and tingling).    promethazine (PHENERGAN) 25 MG tablet TAKE 1 TABLET(25 MG) BY MOUTH EVERY 4 HOURS. NO MORE THAN 4 TABLETS IN 24 HOURS (Patient not taking: Reported on 6/16/2022)    vitamin D (VITAMIN D3) 1000 units Tab Take 1 tablet (1,000 Units total) by mouth once daily.   Last reviewed on 6/24/2022  9:21 AM by Cesia Rowland, PharmD    Review of patient's allergies indicates:  No Known Allergies Last reviewed on  7/26/2022 1:47 PM by Lavelle Rea      Tasks added this encounter   8/19/2022 - Refill Call (Auto Added)   Tasks due within next 3 months   No tasks due.     Ambar Elmore, PharmD  [facility]  [address] [place] [id number]  Phone: [phone]  Fax: [phone]

## 2022-07-29 ENCOUNTER — PATIENT MESSAGE (OUTPATIENT)
Dept: RHEUMATOLOGY | Facility: CLINIC | Age: 42
End: 2022-07-29
Payer: COMMERCIAL

## 2022-08-03 ENCOUNTER — PATIENT MESSAGE (OUTPATIENT)
Dept: RHEUMATOLOGY | Facility: CLINIC | Age: 42
End: 2022-08-03
Payer: COMMERCIAL

## 2022-08-19 ENCOUNTER — SPECIALTY PHARMACY (OUTPATIENT)
Dept: PHARMACY | Facility: CLINIC | Age: 42
End: 2022-08-19
Payer: COMMERCIAL

## 2022-08-19 NOTE — TELEPHONE ENCOUNTER
Benefits investigation   Spoke with Estefanía at [employer].     Payor: [employer]  Annual Deductible Amount: $0  Annual Out of Packet (OOP) Maximum: $4000  Estimated Copay: $13.34  Network Status: Yes     Pending for initial

## 2022-08-19 NOTE — TELEPHONE ENCOUNTER
Specialty Pharmacy - Refill Coordination    Specialty Medication Orders Linked to Encounter    Flowsheet Row Most Recent Value   Medication #1 adalimumab (HUMIRA PEN) PnKt injection (Order#[id number], Rx#[id number])   Medication #2 methotrexate 25 mg/mL injection (Order#[id number], Rx#[id number])          Refill Questions - Documented Responses    Flowsheet Row Most Recent Value   Patient Availability and HIPAA Verification    Does patient want to proceed with activity? Yes   HIPAA/medical authority confirmed? Yes   Relationship to patient of person spoken to? Self   Refill Screening Questions    Changes to allergies? No   Changes to medications? No   New conditions since last clinic visit? No   Unplanned office visit, urgent care, ED, or hospital admission in the last 4 weeks? No   How does patient/caregiver feel medication is working? Good   Financial problems or insurance changes? No   How many doses of your specialty medications were missed in the last 4 weeks? 0   Would patient like to speak to a pharmacist? No   When does the patient need to receive the medication? 08/26/22   Refill Delivery Questions    How will the patient receive the medication? Delivery Shanae   When does the patient need to receive the medication? 08/26/22   Shipping Address Home   Address in [facility] confirmed and updated if neccessary? Yes   Expected Copay ($) 0.34   Is the patient able to afford the medication copay? Yes   Payment Method CC on file   Days supply of Refill 28   Supplies needed? No supplies needed   Refill activity completed? Yes   Refill activity plan Refill scheduled   Shipment/Pickup Date: 08/19/22          Current Outpatient Medications   Medication Sig    acetaminophen (TYLENOL) 500 MG tablet Take 2 tablets (1,000 mg total) by mouth every 8 (eight) hours as needed for Pain.    adalimumab (HUMIRA PEN) PnKt injection Inject 1 pen (40 mg total) into the skin every 14 (fourteen) days.    aspirin 81 MG Chew  Take 81 mg by mouth once daily.    calcium carbonate (TUMS) 200 mg calcium (500 mg) chewable tablet Take 2 tablets (1,000 mg total) by mouth once daily.    DULoxetine (CYMBALTA) 60 MG capsule Take 1 capsule (60 mg total) by mouth 2 (two) times daily.    folic acid (FOLVITE) 1 MG tablet Take 1 tablet (1 mg total) by mouth 3 (three) times daily.    methotrexate 25 mg/mL injection Inject 0.9 mL into the skin weekly    pregabalin (LYRICA) 50 MG capsule Take 1 capsule (50 mg total) by mouth 2 (two) times daily as needed (burning and tingling).    promethazine (PHENERGAN) 25 MG tablet TAKE 1 TABLET(25 MG) BY MOUTH EVERY 4 HOURS. NO MORE THAN 4 TABLETS IN 24 HOURS (Patient not taking: Reported on 6/16/2022)    vitamin D (VITAMIN D3) 1000 units Tab Take 1 tablet (1,000 Units total) by mouth once daily.   Last reviewed on 6/24/2022  9:21 AM by Cesia Rowland PharmD    Review of patient's allergies indicates:  No Known Allergies Last reviewed on  7/26/2022 1:47 PM by Lavelel Rea      Tasks added this encounter   9/16/2022 - Refill Call (Auto Added)   Tasks due within next 3 months   No tasks due.     Batool Felder, PharmD  [facility]  [address] [place] [id number]  Phone: [phone]  Fax: [phone]

## 2022-08-21 ENCOUNTER — PATIENT MESSAGE (OUTPATIENT)
Dept: RHEUMATOLOGY | Facility: CLINIC | Age: 42
End: 2022-08-21
Payer: COMMERCIAL

## 2022-08-22 ENCOUNTER — SPECIALTY PHARMACY (OUTPATIENT)
Dept: PHARMACY | Facility: CLINIC | Age: 42
End: 2022-08-22
Payer: COMMERCIAL

## 2022-08-22 NOTE — TELEPHONE ENCOUNTER
Spoke with Mrs. Braun. Initial not required. Patient insurance changed from person code 1 to person code 2. Patient will receive medication today and scheduled to inject both MTX and Humira on Friday.

## 2022-08-23 ENCOUNTER — PATIENT MESSAGE (OUTPATIENT)
Dept: RHEUMATOLOGY | Facility: CLINIC | Age: 42
End: 2022-08-23
Payer: COMMERCIAL

## 2022-08-24 ENCOUNTER — E-VISIT (OUTPATIENT)
Dept: FAMILY MEDICINE | Facility: CLINIC | Age: 42
End: 2022-08-24
Payer: COMMERCIAL

## 2022-08-24 ENCOUNTER — PATIENT MESSAGE (OUTPATIENT)
Dept: FAMILY MEDICINE | Facility: CLINIC | Age: 42
End: 2022-08-24

## 2022-08-24 VITALS
HEART RATE: 92 BPM | BODY MASS INDEX: 56.58 KG/M2 | TEMPERATURE: 97 F | WEIGHT: 293 LBS | DIASTOLIC BLOOD PRESSURE: 86 MMHG | SYSTOLIC BLOOD PRESSURE: 126 MMHG

## 2022-08-24 DIAGNOSIS — J06.9 UPPER RESPIRATORY TRACT INFECTION, UNSPECIFIED TYPE: Primary | ICD-10-CM

## 2022-08-24 PROCEDURE — 99421 OL DIG E/M SVC 5-10 MIN: CPT | Mod: S$GLB,,, | Performed by: FAMILY MEDICINE

## 2022-08-24 PROCEDURE — 99421 PR E&M, ONLINE DIGIT, EST, < 7 DAYS, 5-10 MINS: ICD-10-PCS | Mod: S$GLB,,, | Performed by: FAMILY MEDICINE

## 2022-08-24 RX ORDER — FLUTICASONE PROPIONATE 50 MCG
1 SPRAY, SUSPENSION (ML) NASAL DAILY
Qty: 16 G | Refills: 3 | Status: SHIPPED | OUTPATIENT
Start: 2022-08-24

## 2022-08-24 RX ORDER — METHYLPREDNISOLONE 4 MG/1
TABLET ORAL
Qty: 21 EACH | Refills: 0 | Status: SHIPPED | OUTPATIENT
Start: 2022-08-24 | End: 2022-09-14

## 2022-08-24 NOTE — PROGRESS NOTES
Subjective:       Patient ID: Kelly Dove is a 42 y.o. female.    Chief Complaint: URI    Patient ID: Kelly Dove is a 42 y.o. female.    Chief Complaint: URI    The patient initiated a request through [employer] on 8/24/2022 for evaluation and management with a chief complaint of URI     I evaluated the questionnaire submission on 8/24/2022.        Active Problem List with Overview Notes    Other insomnia not due to a substance or known physiological condition      Subclavian arterial stenosis      PVD (peripheral vascular disease)      Blurred vision, right eye         Date Noted: 10/29/2021      Arteritis, Takayasu         Date Noted: 10/28/2021      Elevated C-reactive protein (CRP)         Date Noted: 10/28/2021      Severe obesity (BMI >= 40)         Date Noted: 10/28/2021      Smoking greater than 20 pack years         Date Noted: 10/28/2021      Chest pain         Date Noted: 10/28/2021      Abdominal pain         Date Noted: 10/28/2021       Recent Labs Obtained:  No visits with results within 7 Day(s) from this visit.  Latest known visit with results is:  Lab Visit on 06/02/2022  WBC                                           Date: 06/02/2022  Value: 4.48        Ref range: 3.90 - 12.70 K/uL  Status: Final  RBC                                           Date: 06/02/2022  Value: 4.78        Ref range: 4.00 - 5.40 M/uL   Status: Final  Hemoglobin                                    Date: 06/02/2022  Value: 14.3        Ref range: 12.0 - 16.0 g/dL   Status: Final  Hematocrit                                    Date: 06/02/2022  Value: 44.4        Ref range: 37.0 - 48.5 %      Status: Final  MCV                                           Date: 06/02/2022  Value: 93          Ref range: 82 - 98 fL         Status: Final  MCH                                           Date: 06/02/2022  Value: 29.9        Ref range: 27.0 - 31.0 pg     Status: Final  MCHC                                          Date:  06/02/2022  Value: 32.2        Ref range: 32.0 - 36.0 g/dL   Status: Final  RDW                                           Date: 06/02/2022  Value: 13.4        Ref range: 11.5 - 14.5 %      Status: Final  Platelets                                     Date: 06/02/2022  Value: 179         Ref range: 150 - 450 K/uL     Status: Final  MPV                                           Date: 06/02/2022  Value: 11.6        Ref range: 9.2 - 12.9 fL      Status: Final  Immature Granulocytes                         Date: 06/02/2022  Value: 0.2         Ref range: 0.0 - 0.5 %        Status: Final  Gran # (ANC)                                  Date: 06/02/2022  Value: 2.4         Ref range: 1.8 - 7.7 K/uL     Status: Final  Immature Grans (Abs)                          Date: 06/02/2022  Value: 0.01        Ref range: 0.00 - 0.04 K/uL   Status: Final                Comment: Mild elevation in immature granulocytes is non specific and can be seen in a variety of conditions including stress response, acute inflammation, trauma and pregnancy. Correlation with other laboratory and clinical findings is essential.  Lymph #                                       Date: 06/02/2022  Value: 1.5         Ref range: 1.0 - 4.8 K/uL     Status: Final  Mono #                                        Date: 06/02/2022  Value: 0.5         Ref range: 0.3 - 1.0 K/uL     Status: Final  Eos #                                         Date: 06/02/2022  Value: 0.1         Ref range: 0.0 - 0.5 K/uL     Status: Final  Baso #                                        Date: 06/02/2022  Value: 0.03        Ref range: 0.00 - 0.20 K/uL   Status: Final  nRBC                                          Date: 06/02/2022  Value: 0           Ref range: 0 /100 WBC         Status: Final  Gran %                                        Date: 06/02/2022  Value: 52.5        Ref range: 38.0 - 73.0 %      Status: Final  Lymph %                                       Date: 06/02/2022  Value: 32.8         Ref range: 18.0 - 48.0 %      Status: Final  Mono %                                        Date: 06/02/2022  Value: 10.7        Ref range: 4.0 - 15.0 %       Status: Final  Eosinophil %                                  Date: 06/02/2022  Value: 3.1         Ref range: 0.0 - 8.0 %        Status: Final  Basophil %                                    Date: 06/02/2022  Value: 0.7         Ref range: 0.0 - 1.9 %        Status: Final  Differential Method                           Date: 06/02/2022  Value: Automated     Status: Final  Sodium                                        Date: 06/02/2022  Value: 138         Ref range: 136 - 145 mmol/L   Status: Final  Potassium                                     Date: 06/02/2022  Value: 4.1         Ref range: 3.5 - 5.1 mmol/L   Status: Final  Chloride                                      Date: 06/02/2022  Value: 104         Ref range: 95 - 110 mmol/L    Status: Final  CO2                                           Date: 06/02/2022  Value: 24          Ref range: 23 - 29 mmol/L     Status: Final  Glucose                                       Date: 06/02/2022  Value: 92          Ref range: 70 - 110 mg/dL     Status: Final  BUN                                           Date: 06/02/2022  Value: 7           Ref range: 6 - 20 mg/dL       Status: Final  Creatinine                                    Date: 06/02/2022  Value: 0.6         Ref range: 0.5 - 1.4 mg/dL    Status: Final  Calcium                                       Date: 06/02/2022  Value: 9.5         Ref range: 8.7 - 10.5 mg/dL   Status: Final  Total Protein                                 Date: 06/02/2022  Value: 6.8         Ref range: 6.0 - 8.4 g/dL     Status: Final  Albumin                                       Date: 06/02/2022  Value: 4.3         Ref range: 3.5 - 5.2 g/dL     Status: Final  Total Bilirubin                               Date: 06/02/2022  Value: 0.6         Ref range: 0.1 - 1.0 mg/dL    Status: Final                 Comment: For infants and newborns, interpretation of results should be basedon gestational age, weight and in agreement with clinicalobservations.Premature Infant recommended reference ranges:Up to 24 hours.............<8.0 mg/dLUp to 48 hours............<12.0 mg/dL3-5 days..................<15.0 mg/dL6-29 days.................<15.0 mg/dL  Alkaline Phosphatase                          Date: 06/02/2022  Value: 73          Ref range: 55 - 135 U/L       Status: Final  AST                                           Date: 06/02/2022  Value: 28          Ref range: 10 - 40 U/L        Status: Final  ALT                                           Date: 06/02/2022  Value: 54 (A)      Ref range: 10 - 44 U/L        Status: Final  Anion Gap                                     Date: 06/02/2022  Value: 10          Ref range: 8 - 16 mmol/L      Status: Final  eGFR if                       Date: 06/02/2022  Value: >60.0       Ref range: >60 mL/min/1.73 *  Status: Final  eGFR if non                   Date: 06/02/2022  Value: >60.0       Ref range: >60 mL/min/1.73 *  Status: Final                Comment: Calculation used to obtain the estimated glomerular filtrationrate (eGFR) is the CKD-EPI equation.   Sed Rate                                      Date: 06/02/2022  Value: 3           Ref range: 0 - 20 mm/Hr       Status: Final  CRP                                           Date: 06/02/2022  Value: 4.5         Ref range: 0.0 - 8.2 mg/L     Status: Final  Cholesterol                                   Date: 06/02/2022  Value: 169         Ref range: 120 - 199 mg/dL    Status: Final                Comment: The National Cholesterol Education Program (NCEP) has set thefollowing guidelines (reference ranges) for Cholesterol:Optimal.....................<200 mg/dLBorderline High.............200-239 mg/dLHigh........................> or = 240 mg/dL  Triglycerides                                 Date:  06/02/2022  Value: 97          Ref range: 30 - 150 mg/dL     Status: Final                Comment: The National Cholesterol Education Program (NCEP) has set thefollowing guidelines (reference values) for triglycerides:Normal......................<150 mg/dLBorderline High.............150-199 mg/dLHigh........................200-499 mg/dL  HDL                                           Date: 06/02/2022  Value: 48          Ref range: 40 - 75 mg/dL      Status: Final                Comment: The National Cholesterol Education Program (NCEP) has set thefollowing guidelines (reference values) for HDL Cholesterol:Low...............<40 mg/dLOptimal...........>60 mg/dL  LDL Cholesterol                               Date: 06/02/2022  Value: 101.6       Ref range: 63.0 - 159.0 mg/*  Status: Final                Comment: The National Cholesterol Education Program (NCEP) has set thefollowing guidelines (reference values) for LDL Cholesterol:Optimal.......................<130 mg/dLBorderline High...............130-159 mg/dLHigh..........................160-189 mg/dLVery High.....................>190 mg/dL  HDL/Cholesterol Ratio                         Date: 06/02/2022  Value: 28.4        Ref range: 20.0 - 50.0 %      Status: Final  Total Cholesterol/HDL Ratio                   Date: 06/02/2022  Value: 3.5         Ref range: 2.0 - 5.0          Status: Final  Non-HDL Cholesterol                           Date: 06/02/2022  Value: 121         Ref range: mg/dL              Status: Final                Comment: Risk category and Non-HDL cholesterol goals:Coronary heart disease (CHD)or equivalent (10-year risk of CHD >20%):Non-HDL cholesterol goal     <130 mg/dLTwo or more CHD risk factors and 10-year risk of CHD <= 20%:Non-HDL cholesterol goal     <160 mg/dL0 to 1 CHD risk factor:Non-HDL cholesterol goal     <190 mg/dL  ------------    Upper respiratory tract infection, unspecified type  (primary encounter diagnosis)     I would like her  to get a COVID test done.  Will treat with steroids at this time.    Orders Placed This Encounter      POCT COVID-19 Rapid Screening          Standing Status: Future          Standing Expiration Date: 10/23/2023          Order Specific Question: Is the patient symptomatic?          Answer: Yes     Orders Placed This Encounter      fluticasone propionate (FLONASE) 50 mcg/actuation nasal spray          Si spray (50 mcg total) by Each Nostril route once daily.          Dispense:  16 g          Refill:  3      methylPREDNISolone (MEDROL DOSEPACK) 4 mg tablet          Sig: use as directed          Dispense:  21 each          Refill:  0       E-Visit Time Tracking:    Day 1 Time (in minutes): 7     Total Time (in minutes): 7           URI       Review of Systems    Objective:      Vitals:    22 1416   BP: 126/86   Pulse: 92   Temp: 97.2 °F (36.2 °C)   Weight: (!) 154.2 kg (340 lb)      Physical Exam    Results for orders placed or performed in visit on 22   CBC Auto Differential   Result Value Ref Range    WBC 4.48 3.90 - 12.70 K/uL    RBC 4.78 4.00 - 5.40 M/uL    Hemoglobin 14.3 12.0 - 16.0 g/dL    Hematocrit 44.4 37.0 - 48.5 %    MCV 93 82 - 98 fL    MCH 29.9 27.0 - 31.0 pg    MCHC 32.2 32.0 - 36.0 g/dL    RDW 13.4 11.5 - 14.5 %    Platelets 179 150 - 450 K/uL    MPV 11.6 9.2 - 12.9 fL    Immature Granulocytes 0.2 0.0 - 0.5 %    Gran # (ANC) 2.4 1.8 - 7.7 K/uL    Immature Grans (Abs) 0.01 0.00 - 0.04 K/uL    Lymph # 1.5 1.0 - 4.8 K/uL    Mono # 0.5 0.3 - 1.0 K/uL    Eos # 0.1 0.0 - 0.5 K/uL    Baso # 0.03 0.00 - 0.20 K/uL    nRBC 0 0 /100 WBC    Gran % 52.5 38.0 - 73.0 %    Lymph % 32.8 18.0 - 48.0 %    Mono % 10.7 4.0 - 15.0 %    Eosinophil % 3.1 0.0 - 8.0 %    Basophil % 0.7 0.0 - 1.9 %    Differential Method Automated    Comprehensive Metabolic Panel   Result Value Ref Range    Sodium 138 136 - 145 mmol/L    Potassium 4.1 3.5 - 5.1 mmol/L    Chloride 104 95 - 110 mmol/L    CO2 24 23 - 29 mmol/L     Glucose 92 70 - 110 mg/dL    BUN 7 6 - 20 mg/dL    Creatinine 0.6 0.5 - 1.4 mg/dL    Calcium 9.5 8.7 - 10.5 mg/dL    Total Protein 6.8 6.0 - 8.4 g/dL    Albumin 4.3 3.5 - 5.2 g/dL    Total Bilirubin 0.6 0.1 - 1.0 mg/dL    Alkaline Phosphatase 73 55 - 135 U/L    AST 28 10 - 40 U/L    ALT 54 (H) 10 - 44 U/L    Anion Gap 10 8 - 16 mmol/L    eGFR if African American >60.0 >60 mL/min/1.73 m^2    eGFR if non African American >60.0 >60 mL/min/1.73 m^2   Sedimentation rate   Result Value Ref Range    Sed Rate 3 0 - 20 mm/Hr   C-Reactive Protein   Result Value Ref Range    CRP 4.5 0.0 - 8.2 mg/L   Lipid Panel   Result Value Ref Range    Cholesterol 169 120 - 199 mg/dL    Triglycerides 97 30 - 150 mg/dL    HDL 48 40 - 75 mg/dL    LDL Cholesterol 101.6 63.0 - 159.0 mg/dL    HDL/Cholesterol Ratio 28.4 20.0 - 50.0 %    Total Cholesterol/HDL Ratio 3.5 2.0 - 5.0    Non-HDL Cholesterol 121 mg/dL      Assessment:       1. Upper respiratory tract infection, unspecified type        Plan:       Upper respiratory tract infection, unspecified type  -     POCT COVID-19 Rapid Screening; Future; Expected date: 08/24/2022  -     fluticasone propionate (FLONASE) 50 mcg/actuation nasal spray; 1 spray (50 mcg total) by Each Nostril route once daily.  Dispense: 16 g; Refill: 3  -     methylPREDNISolone (MEDROL DOSEPACK) 4 mg tablet; use as directed  Dispense: 21 each; Refill: 0          Medication List with Changes/Refills   New Medications    FLUTICASONE PROPIONATE (FLONASE) 50 MCG/ACTUATION NASAL SPRAY    1 spray (50 mcg total) by Each Nostril route once daily.    METHYLPREDNISOLONE (MEDROL DOSEPACK) 4 MG TABLET    use as directed   Current Medications    ACETAMINOPHEN (TYLENOL) 500 MG TABLET    Take 2 tablets (1,000 mg total) by mouth every 8 (eight) hours as needed for Pain.    ADALIMUMAB (HUMIRA PEN) PNKT INJECTION    Inject 1 pen (40 mg total) into the skin every 14 (fourteen) days.    ASPIRIN 81 MG CHEW    Take 81 mg by mouth once daily.     CALCIUM CARBONATE (TUMS) 200 MG CALCIUM (500 MG) CHEWABLE TABLET    Take 2 tablets (1,000 mg total) by mouth once daily.    DULOXETINE (CYMBALTA) 60 MG CAPSULE    Take 1 capsule (60 mg total) by mouth 2 (two) times daily.    FOLIC ACID (FOLVITE) 1 MG TABLET    Take 1 tablet (1 mg total) by mouth 3 (three) times daily.    METHOTREXATE 25 MG/ML INJECTION    Inject 0.9 mL into the skin weekly    PREGABALIN (LYRICA) 50 MG CAPSULE    Take 1 capsule (50 mg total) by mouth 2 (two) times daily as needed (burning and tingling).    PROMETHAZINE (PHENERGAN) 25 MG TABLET    TAKE 1 TABLET(25 MG) BY MOUTH EVERY 4 HOURS. NO MORE THAN 4 TABLETS IN 24 HOURS    VITAMIN D (VITAMIN D3) 1000 UNITS TAB    Take 1 tablet (1,000 Units total) by mouth once daily.

## 2022-09-07 ENCOUNTER — PATIENT MESSAGE (OUTPATIENT)
Dept: FAMILY MEDICINE | Facility: CLINIC | Age: 42
End: 2022-09-07
Payer: COMMERCIAL

## 2022-09-12 ENCOUNTER — PATIENT MESSAGE (OUTPATIENT)
Dept: RHEUMATOLOGY | Facility: CLINIC | Age: 42
End: 2022-09-12
Payer: COMMERCIAL

## 2022-09-16 ENCOUNTER — SPECIALTY PHARMACY (OUTPATIENT)
Dept: PHARMACY | Facility: CLINIC | Age: 42
End: 2022-09-16
Payer: COMMERCIAL

## 2022-09-16 NOTE — TELEPHONE ENCOUNTER
Specialty Pharmacy - Refill Coordination    Specialty Medication Orders Linked to Encounter      Flowsheet Row Most Recent Value   Medication #1 adalimumab (HUMIRA PEN) PnKt injection (Order#[id number], Rx#[id number])   Medication #2 methotrexate 25 mg/mL injection (Order#[id number], Rx#[id number])            Refill Questions - Documented Responses      Flowsheet Row Most Recent Value   Patient Availability and HIPAA Verification    Does patient want to proceed with activity? Yes   HIPAA/medical authority confirmed? Yes   Relationship to patient of person spoken to? Self   Refill Screening Questions    Changes to allergies? No   Changes to medications? No   New conditions since last clinic visit? No   Unplanned office visit, urgent care, ED, or hospital admission in the last 4 weeks? No   How does patient/caregiver feel medication is working? Excellent   Financial problems or insurance changes? No   How many doses of your specialty medications were missed in the last 4 weeks? 0   Would patient like to speak to a pharmacist? No   When does the patient need to receive the medication? 09/30/22   Refill Delivery Questions    How will the patient receive the medication? Delivery Shanae   When does the patient need to receive the medication? 09/30/22   Shipping Address Home   Address in [facility] confirmed and updated if neccessary? Yes   Expected Copay ($) 18.34   Is the patient able to afford the medication copay? Yes   Payment Method CC on file   Days supply of Refill 28   Supplies needed? No supplies needed   Refill activity completed? Yes   Refill activity plan Refill scheduled   Shipment/Pickup Date: 09/20/22            Current Outpatient Medications   Medication Sig    acetaminophen (TYLENOL) 500 MG tablet Take 2 tablets (1,000 mg total) by mouth every 8 (eight) hours as needed for Pain.    adalimumab (HUMIRA PEN) PnKt injection Inject 1 pen (40 mg total) into the skin every 14 (fourteen) days.    aspirin 81  MG Chew Take 81 mg by mouth once daily.    calcium carbonate (TUMS) 200 mg calcium (500 mg) chewable tablet Take 2 tablets (1,000 mg total) by mouth once daily.    DULoxetine (CYMBALTA) 60 MG capsule Take 1 capsule (60 mg total) by mouth 2 (two) times daily.    fluticasone propionate (FLONASE) 50 mcg/actuation nasal spray 1 spray (50 mcg total) by Each Nostril route once daily.    folic acid (FOLVITE) 1 MG tablet Take 1 tablet (1 mg total) by mouth 3 (three) times daily.    methotrexate 25 mg/mL injection Inject 0.9 mL into the skin weekly    pregabalin (LYRICA) 50 MG capsule Take 1 capsule (50 mg total) by mouth 2 (two) times daily as needed (burning and tingling).    promethazine (PHENERGAN) 25 MG tablet TAKE 1 TABLET(25 MG) BY MOUTH EVERY 4 HOURS. NO MORE THAN 4 TABLETS IN 24 HOURS (Patient not taking: Reported on 6/16/2022)    vitamin D (VITAMIN D3) 1000 units Tab Take 1 tablet (1,000 Units total) by mouth once daily.   Last reviewed on 8/24/2022  2:18 PM by Horacio Erazo MD    Review of patient's allergies indicates:  No Known Allergies Last reviewed on  8/24/2022 2:18 PM by Horacio Erazo      Tasks added this encounter   No tasks added.   Tasks due within next 3 months   11/28/2022 - Clinical - Follow Up Assesement (Annual)  9/16/2022 - Refill Call (Auto Added)     [facility]  [address] [place] [id number]  Phone: [phone]  Fax: [phone]

## 2022-09-19 ENCOUNTER — OFFICE VISIT (OUTPATIENT)
Dept: RHEUMATOLOGY | Facility: CLINIC | Age: 42
End: 2022-09-19
Payer: COMMERCIAL

## 2022-09-19 ENCOUNTER — LAB VISIT (OUTPATIENT)
Dept: LAB | Facility: HOSPITAL | Age: 42
End: 2022-09-19
Attending: INTERNAL MEDICINE
Payer: COMMERCIAL

## 2022-09-19 ENCOUNTER — PATIENT MESSAGE (OUTPATIENT)
Dept: RHEUMATOLOGY | Facility: CLINIC | Age: 42
End: 2022-09-19

## 2022-09-19 DIAGNOSIS — M31.4 ARTERITIS, TAKAYASU: Primary | ICD-10-CM

## 2022-09-19 DIAGNOSIS — M31.4 TAKAYASU'S ARTERITIS: ICD-10-CM

## 2022-09-19 LAB
ALBUMIN SERPL BCP-MCNC: 4.2 G/DL (ref 3.5–5.2)
ALP SERPL-CCNC: 77 U/L (ref 55–135)
ALT SERPL W/O P-5'-P-CCNC: 59 U/L (ref 10–44)
ANION GAP SERPL CALC-SCNC: 9 MMOL/L (ref 8–16)
AST SERPL-CCNC: 26 U/L (ref 10–40)
BASOPHILS # BLD AUTO: 0.03 K/UL (ref 0–0.2)
BASOPHILS NFR BLD: 0.5 % (ref 0–1.9)
BILIRUB SERPL-MCNC: 0.6 MG/DL (ref 0.1–1)
BUN SERPL-MCNC: 7 MG/DL (ref 6–20)
CALCIUM SERPL-MCNC: 9.5 MG/DL (ref 8.7–10.5)
CHLORIDE SERPL-SCNC: 103 MMOL/L (ref 95–110)
CO2 SERPL-SCNC: 26 MMOL/L (ref 23–29)
CREAT SERPL-MCNC: 0.6 MG/DL (ref 0.5–1.4)
CRP SERPL-MCNC: 6 MG/L (ref 0–8.2)
DIFFERENTIAL METHOD: ABNORMAL
EOSINOPHIL # BLD AUTO: 0.2 K/UL (ref 0–0.5)
EOSINOPHIL NFR BLD: 3.2 % (ref 0–8)
ERYTHROCYTE [DISTWIDTH] IN BLOOD BY AUTOMATED COUNT: 13.4 % (ref 11.5–14.5)
ERYTHROCYTE [SEDIMENTATION RATE] IN BLOOD BY WESTERGREN METHOD: 5 MM/HR (ref 0–20)
EST. GFR  (NO RACE VARIABLE): >60 ML/MIN/1.73 M^2
GLUCOSE SERPL-MCNC: 94 MG/DL (ref 70–110)
HCT VFR BLD AUTO: 45.3 % (ref 37–48.5)
HGB BLD-MCNC: 14.9 G/DL (ref 12–16)
IMM GRANULOCYTES # BLD AUTO: 0.02 K/UL (ref 0–0.04)
IMM GRANULOCYTES NFR BLD AUTO: 0.3 % (ref 0–0.5)
LYMPHOCYTES # BLD AUTO: 1.9 K/UL (ref 1–4.8)
LYMPHOCYTES NFR BLD: 30.2 % (ref 18–48)
MCH RBC QN AUTO: 31.6 PG (ref 27–31)
MCHC RBC AUTO-ENTMCNC: 32.9 G/DL (ref 32–36)
MCV RBC AUTO: 96 FL (ref 82–98)
MONOCYTES # BLD AUTO: 0.5 K/UL (ref 0.3–1)
MONOCYTES NFR BLD: 8.2 % (ref 4–15)
NEUTROPHILS # BLD AUTO: 3.6 K/UL (ref 1.8–7.7)
NEUTROPHILS NFR BLD: 57.6 % (ref 38–73)
NRBC BLD-RTO: 0 /100 WBC
PLATELET # BLD AUTO: 210 K/UL (ref 150–450)
PMV BLD AUTO: 11 FL (ref 9.2–12.9)
POTASSIUM SERPL-SCNC: 4.6 MMOL/L (ref 3.5–5.1)
PROT SERPL-MCNC: 6.7 G/DL (ref 6–8.4)
RBC # BLD AUTO: 4.72 M/UL (ref 4–5.4)
SODIUM SERPL-SCNC: 138 MMOL/L (ref 136–145)
WBC # BLD AUTO: 6.32 K/UL (ref 3.9–12.7)

## 2022-09-19 PROCEDURE — 85651 RBC SED RATE NONAUTOMATED: CPT | Mod: PO | Performed by: INTERNAL MEDICINE

## 2022-09-19 PROCEDURE — 80053 COMPREHEN METABOLIC PANEL: CPT | Performed by: INTERNAL MEDICINE

## 2022-09-19 PROCEDURE — 99214 PR OFFICE/OUTPT VISIT, EST, LEVL IV, 30-39 MIN: ICD-10-PCS | Mod: 95,,, | Performed by: INTERNAL MEDICINE

## 2022-09-19 PROCEDURE — 86140 C-REACTIVE PROTEIN: CPT | Performed by: INTERNAL MEDICINE

## 2022-09-19 PROCEDURE — 36415 COLL VENOUS BLD VENIPUNCTURE: CPT | Mod: PO | Performed by: INTERNAL MEDICINE

## 2022-09-19 PROCEDURE — 1159F MED LIST DOCD IN RCRD: CPT | Mod: CPTII,95,, | Performed by: INTERNAL MEDICINE

## 2022-09-19 PROCEDURE — 85025 COMPLETE CBC W/AUTO DIFF WBC: CPT | Performed by: INTERNAL MEDICINE

## 2022-09-19 PROCEDURE — 1159F PR MEDICATION LIST DOCUMENTED IN MEDICAL RECORD: ICD-10-PCS | Mod: CPTII,95,, | Performed by: INTERNAL MEDICINE

## 2022-09-19 PROCEDURE — 99214 OFFICE O/P EST MOD 30 MIN: CPT | Mod: 95,,, | Performed by: INTERNAL MEDICINE

## 2022-09-19 RX ORDER — PROMETHAZINE HYDROCHLORIDE AND DEXTROMETHORPHAN HYDROBROMIDE 6.25; 15 MG/5ML; MG/5ML
SYRUP ORAL
COMMUNITY
Start: 2022-09-08 | End: 2022-10-17

## 2022-09-19 ASSESSMENT — ROUTINE ASSESSMENT OF PATIENT INDEX DATA (RAPID3)
PSYCHOLOGICAL DISTRESS SCORE: 3.3
AM STIFFNESS SCORE: 0, NO
MDHAQ FUNCTION SCORE: 1.4
FATIGUE SCORE: 10
TOTAL RAPID3 SCORE: 6.72
PAIN SCORE: 7
PATIENT GLOBAL ASSESSMENT SCORE: 8.5

## 2022-09-19 NOTE — PROGRESS NOTES
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    The patient location is: home  The chief complaint leading to consultation is: 20 minutes    Visit type: audiovisual    Face to Face time with patient: 20   minutes of total time spent on the encounter, which includes face to face time and non-face to face time preparing to see the patient (eg, review of tests), Obtaining and/or reviewing separately obtained history, Documenting clinical information in the electronic or other health record, Independently interpreting results (not separately reported) and communicating results to the patient/family/caregiver, or Care coordination (not separately reported).         Each patient to whom he or she provides medical services by telemedicine is:  (1) informed of the relationship between the physician and patient and the respective role of any other health care provider with respect to management of the patient; and (2) notified that he or she may decline to receive medical services by telemedicine and may withdraw from such care at any time.    Notes:         Notes:       9/2022    Today we will review paper work     6/2022    Overall she doesn't feel good when she does mtx+ humira at the same time  Off steroids completely     ALT 54  AST nml  CBC,CRP,ESR nml    She is taking some time off work- she feels much better  She thinks rest and staying away from work is helping    MRI brain nml  C spine deg changes     EMG/NCS  A mild right carpal tunnel syndrome (median neuropathy at the wrist) based upon the abnormal transcarpal comparison evaluation. The right median antidromic sensory and orthodromic motor evaluations are within normal limits.     Small fibre neuropathy- discussed by neurology- she defers to a later date    Neurology- increased cymbalta to 60 mg bid  lyrica 25 mg bid prn as needed  offered    Vit b12 : 345  b1 nml  CHAR,SSA,SSB neg  TSH nml    BP- one arm 119/85 and the other arm 152/97    No gain in weight  She has lost 10 pounds and then she gained some back    Vascular- 3/2022   has seen her- surgery not an option  He thinks every 18 months follow up with PPGs,CAROTID AND SUBCLAVIAN US- will be sufficient    On aspirin 81 mg daily  Trying to quit smoking      2/2022    Whole body pain  covid feb 1rst- 9 days- sinus stuffiness, fatigue    2/10    CRP 13.3  ESR nml    We missed mtx for 10 days due to covid and the CRP might be high due to the covid and not being on mtx    On mtx 9 pills weekly  Folic acid daily    On prednisone 5 mg until she got covid  Now off it    BP today 161/104    Sleep study today  Neurology appointment pending    Many scans were done in 12/2021      VAS US arterial arms    Rt thumb doppler waveform - PPG:   minimally dampened   Rt index finger doppler waveform - PPG:    minimally dampened   Rt middle finger doppler waveform - PPG:   minimally dampened   Rt ring finger doppler waveform - PPG: minimally dampened   Rt little finger doppler waveform - PPG:   minimally dampened   Lt thumb doppler waveform - PPG:   Within normal limits   Lt index finger doppler waveform - PPG:    Within normal limits   Lt middle finger doppler waveform - PPG:   Within normal limits   Lt ring finger doppler waveform - PPG: Within normal limits   Lt little finger doppler waveform - PPG:   Within normal limits     VAS US carotids b/l    RIGHT SIDE:   Normal - No evidence of plaque in the right internal carotid artery.   Antegrade flow in the right vertebral artery.   The right Subclavian artery is not visualized, suggestive of possible vessel occlusion. The Axillary and prox Brachial arteries appear   patent without a significant stenosis.     LEFT SIDE:   Normal - No evidence of plaque in the left internal carotid artery.   Antegrade flow in the left vertebral artery.   The left  Subclavian and Axillary arteries appear patent without a significant stenosis.       VAS US CARLOS    Right Leg: Segmental pressures may be unreliable due to suspected medial calcinosis; however, PVR waveforms suggest no   evidence of significant peripheral arterial occlusive disease.   - Rt Great Toe: The PPG waveform as described above. - TBI of 1.2 with a toe pressure of 157 mmHg is noted.     Left Leg: Segmental pressures may be unreliable due to suspected medial calcinosis; however, PVR waveforms suggest minimal   femoral peripheral arterial occlusive disease.   - Lt Great Toe: The PPG waveform as described above. - TBI of 1.24 with a toe pressure of 162 mmHg is noted.     -Brachial pressures differ by greater than 20 mmHg.       12/2021    She has been calling me with the following complaints :     Left facial swelling  Left arm swelling  Left hand and wrist swelling  Left ankle swelling  She sleeps on both sides    The swelling is random    Left facial twitching   Numbness in the hands  Right toe tingles    On taper of prednisone,now on 10 mg-for 3 days now  Prior to that we did 40/30/20  Cramps in the legs    On mtx 7 tabs weekly,folic acid  Plan is to maximise mtx to 9 pills weekly /folic acid     She is very tired    ESR,CRP nml      41 year old white female presented with      Chest pains-mid September-went to the hospital  EKG abnormal-nonspecific ST segment in inferolateral leads  Admitted     Right subclavian artery occluded    Stress test-decreased LAD territory  ECHO : Gradient across aortic valve nml  EF nml  Grade 1 diastolic dysfunction    Heart fine,no blockages  Cholesterol normal  a1c normal  Triglycerides normal    Vascular surgeon -has evaluated her    ESR nml  CRP elevated 4.41    Blood cultures negative    CMP nml  CBC nml  TSH nml    Carotid artery -CTA-Right subclavian artery occluded   Aortic root shoot-showed occlusion of right subclavian artery after right coomon carotid artery  origin    There is complete occlusion from the origin of the subclavian artery to the axilla     Symptoms     Fatigue  Night sweats  Feels feverish  Weight gain-40 pounds  Sob  Dizziness  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  Arms ache when she showers  When she gets up she becomes dizzy  Hands are hurting  Chronic headaches-now severe-not responding to ibuprofen    Infact she has 2 year h/o right hand numbness pain and swelling after combing hair and significant work using this hand       Right eye-blurry vision-waves bottom right field  No eye exam recently    Abdominal pain-new -with eating within 10 to 15 minutes-lower abdomen    She had abdominal infection s/p 3rd delivery  She had hysterectomy,saplingo-oophorectomy   3 hernia surgeries   Chronic diarrhea-with eating     Past history : none    Family history : cervical and ovarian cancer  Sister RA  Son JEFFREY  Son severe asthma,food allergies,eczema   Aunt caballero johnsons  Sister-spontaneous coronary dissection s/p delivery     Social history : current smoker,not an alcoholic        Review of Systems   Constitutional:  Positive for fever. Negative for unexpected weight change.   HENT:  Negative for mouth sores and trouble swallowing.    Eyes:  Negative for redness.   Respiratory:  Positive for shortness of breath. Negative for cough.    Cardiovascular:  Positive for chest pain.   Gastrointestinal:  Negative for constipation and diarrhea.   Genitourinary:  Negative for dysuria and genital sores.   Skin:  Negative for rash.   Neurological:  Negative for headaches.   Hematological:  Does not bruise/bleed easily.          No skin rashes,malar rash,photosensitivity   No telangiectasias   No calcinosis   No psoriasis   No patchy alopecia   No oral and nasal ulcers   No dry eyes and dry mouth   No dysphagia,diplopia and dysphonia and muscle weakness   No n/v/c   No acid reflux+   No raynaud's+   No digital ulcers   No cytopenias   No renal issues   No blood clots    No weight loss and loss of appetite   1 miscarriage-molar pregnancy  No recurrent conjunctivitis or uveitis or scleritis or episcleritis   No chronic or bloody diarrhea with no u colitis or crohn's /inflammatory bowel disease   No vaginal or  urethral  d/c/STDs/no ulcers   No unexplained lymphadenopathy,parotitis   No seizures,strokes,psychosis  No sclerodactyly  No puffy hands  No perioral tightness       Physical Exam   Constitutional: She is oriented to person, place, and time. No distress.   HENT:   Head: Normocephalic.   Mouth/Throat: Oropharynx is clear and moist.   Eyes: Pupils are equal, round, and reactive to light. Conjunctivae are normal. Right eye exhibits no discharge. Left eye exhibits no discharge. No scleral icterus.   Neck: No thyromegaly present.   Cardiovascular: Normal rate, regular rhythm and normal heart sounds.   Pulmonary/Chest: Effort normal and breath sounds normal. No stridor.   Abdominal: Soft. Bowel sounds are normal.   Musculoskeletal:         General: Normal range of motion.      Cervical back: Normal range of motion.   Lymphadenopathy:     She has no cervical adenopathy.   Neurological: She is alert and oriented to person, place, and time.   Skin: Skin is warm. No rash noted. She is not diaphoretic.   Psychiatric: Affect and judgment normal.       Laboratory abnormalities    nml CBC,CMP  ESR nml  CRP 9.1  UA no blood or protein  nml troponins  nml BNP  nml hb11c  nml CMP  nml mag and phos  Elevated IL-6  Pre dmard panel neg    Initial imaging studies     CTA chest noncoronary    No pulmonary thromboembolism.  There is high-grade stenosis of the right subclavian artery approximately 2.5 cm from its origin in this patient with reported history of the same.  Correlation is advised.  No acute cardiopulmonary process.  Possible hepatic steatosis, correlation with LFTs recommended.      CTA abdomen and pelvis  Partially calcified splenic artery aneurysm at the level of the hilum.  Otherwise, the abdominopelvic arteries are patent and normal in caliber.       CTA head and neck  1. No acute intracranial findings.  2. CTA head and neck without large vessel occlusion or high-grade stenosis.  3. Additional findings as above.    ECHO    The left ventricle is normal in size with low normal systolic function. The estimated ejection fraction is 50%.  Normal right ventricular size with normal right ventricular systolic function.  Normal left ventricular diastolic function.  Normal central venous pressure (3 mmHg).          Assessment     Patient is a 41 year old female with  [place]sck-Hajmxyol-Flmdc- ancestry-  Comes in with 2 year h/o right arm claudication  Right arm goes numb when she does too much!!  This has progressed over the past 2 years and she now has very limited use of her right arm/cannot brush her hair/cannot shower using this arm for overhead activities    In addition she is     Short of breath  She cannot walk short distances    Has right eye I/M loss of vision right lower field    Postprandial abdominal pain    Dizziness and orthostasis     Labs show elevated cardiac CRP 4.4  nml ESR  CTA-Complete occlusion of right subclavian artery    Other risk factors r./corin  Definitely long term smoking-can put her at high risk for vascular disease  But she doesn't seem to be a diabetic,hypertensive  HDL,LDL ok,triglycerides 210? 110? Discrepancy noted    I admitted her with the intent to get   rpt ESR,CRP  MRA-chest,abdomen,pelvis  Brain and neck  Eye exam  Vascular surgery to kindly look at her asap-assess need for revascularisation    Should we immunosuppress? Steroids +/- TNFs-depends on if there is activity as noted on MRI  Not much data on IL-6 inhibitors    GI -eval -is the postprandial pain from occlusion in an artery ? Or given the multiple surgeries are we dealing with some obstruction etc?    CT abdomen-pelvis in addition to MRA might help    It so happened that she had  CTA chest and she has high grade stenosis of the R subclavian artery. Vascular surgery felt that the stenosis was chronic due to collaterals and signed off.   Optho also saw the patient and did not note any abnormalities on exam.   CTA of abdomen/pelvis showed partially calcified splenic artery aneurysm at the level of the hilum.   CTA head/neck had no without large vessel occlusion or high-grade stenosis.    She was started on 40 mg prednisone with PJP prophylaxis on 10/30/2021    We saw her in the clinic-11/15/2021  We suggested steroid tapering the prednisone   We started mtx 5 pills weekly with a slow titration    She then came to me on mtx 9 pills weekly,folic acid  Humira was not approved yet    BP was high  She had abnormal CARLOS-   She didn't feel food  She was hurting and also swollen    We then got humira approved  She switched mtx to 25 mg sc weekly injection     I wrote a note to      SINCE I R/LYUBOV the recent imaging   I only knew of her right subclavian artery stenosis   But I see that there is a  VAS-US- CARLOS showing  Right Leg: Segmental pressures may be unreliable due to suspected medial calcinosis; however, PVR waveforms suggest no   evidence of significant peripheral arterial occlusive disease.   - Rt Great Toe: The PPG waveform as described above. - TBI of 1.2 with a toe pressure of 157 mmHg is noted.   Left Leg: Segmental pressures may be unreliable due to suspected medial calcinosis; however, PVR waveforms suggest minimal   femoral peripheral arterial occlusive disease.   - Lt Great Toe: The PPG waveform as described above. - TBI of 1.24 with a toe pressure of 162 mmHg is noted.   -Brachial pressures differ by greater than 20 mmHg.   I dont see another CARLOS on the system to compare this to  There is however a CTA abdomen and pelvis when she was first seen by us and admitted   Based on the data we have is there anyway we can tell if the lower extremity lesions are a progression of the  disease? Or this might have been there all along but we didn't know    He wrote back to me :     The lower extremity perfusion is normally - luckily for her; those are normal ABIs and waveforms.     Great to add humira in future for better medical therapy and immunomdulation   No need for surgery now          6/2022    Overall she doesn't feel good when she does mtx+ humira at the same time  Off steroids completely     ALT 54  AST nml  CBC,CRP,ESR nml    She is taking some time off work- she feels much better  She thinks rest and staying away from work is helping    She has undergone extensive work up for hand and arm paresthesias and she has a normal MRI brain,C spine has deg arthritis,she has right carpal tunnel syndrome and skin biopsy for small fibre neuropathy has been postponed to a later date    Today BP- one arm 119/85 and the other arm 152/97  No gain in weight  She has lost 10 pounds and then she gained some back    Vascular- 3/2022   has seen her- surgery not an option  He thinks every 18 months follow up with PPGs,CAROTID AND SUBCLAVIAN US- will be sufficient     On aspirin 81 mg daily  Trying to quit smoking    9/2022    Paper work review      No diagnosis found.              There are no diagnoses linked to this encounter.      Plan      Paper work r/v    humira to continue    mtx can be tapered to 0.8 ml weekly  Folic acid 3 tabs daily     Every 3 months- labs and taper mtx slowly    Sleep study-mild sleep apnea- CPAP-optional    On cymbalta 60 mg daily- will increase to 60 mg bid     Vaccines  covid vaccine x 2  Booster needed  Flu shot  Pneumonia shots    Labs and rtc in 3 months

## 2022-09-19 NOTE — PROGRESS NOTES
Rapid3 Question Responses and Scores 9/19/2022   MDHAQ Score 1.4   Psychologic Score 3.3   Pain Score 7   When you awakened in the morning OVER THE LAST WEEK, did you feel stiff? No   Fatigue Score 10   Global Health Score 8.5   RAPID3 Score 6.72       Answers submitted by the patient for this visit:  Rheumatology Questionnaire (Submitted on 9/19/2022)  fever: No  eye redness: No  mouth sores: No  headaches: Yes  shortness of breath: No  chest pain: Yes  trouble swallowing: No  diarrhea: Yes  constipation: No  unexpected weight change: No  genital sore: No  dysuria: No  During the last 3 days, have you had a skin rash?: No  Bruises or bleeds easily: Yes  cough: Yes

## 2022-09-22 ENCOUNTER — PATIENT MESSAGE (OUTPATIENT)
Dept: RHEUMATOLOGY | Facility: CLINIC | Age: 42
End: 2022-09-22
Payer: COMMERCIAL

## 2022-09-23 ENCOUNTER — PATIENT MESSAGE (OUTPATIENT)
Dept: RHEUMATOLOGY | Facility: CLINIC | Age: 42
End: 2022-09-23
Payer: COMMERCIAL

## 2022-09-28 ENCOUNTER — HOSPITAL ENCOUNTER (OUTPATIENT)
Dept: RADIOLOGY | Facility: HOSPITAL | Age: 42
Discharge: HOME | End: 2022-09-28
Attending: FAMILY MEDICINE
Payer: COMMERCIAL

## 2022-09-28 ENCOUNTER — OFFICE VISIT (OUTPATIENT)
Dept: FAMILY MEDICINE | Facility: CLINIC | Age: 42
End: 2022-09-28
Payer: COMMERCIAL

## 2022-09-28 VITALS
OXYGEN SATURATION: 99 % | BODY MASS INDEX: 47.09 KG/M2 | HEIGHT: 66 IN | DIASTOLIC BLOOD PRESSURE: 68 MMHG | HEART RATE: 114 BPM | SYSTOLIC BLOOD PRESSURE: 132 MMHG | WEIGHT: 293 LBS

## 2022-09-28 DIAGNOSIS — E66.01 SEVERE OBESITY (BMI >= 40): ICD-10-CM

## 2022-09-28 DIAGNOSIS — D84.821 DRUG-INDUCED IMMUNODEFICIENCY: ICD-10-CM

## 2022-09-28 DIAGNOSIS — M31.4 ARTERITIS, TAKAYASU: ICD-10-CM

## 2022-09-28 DIAGNOSIS — I73.9 PERIPHERAL VASCULAR DISEASE, UNSPECIFIED: ICD-10-CM

## 2022-09-28 DIAGNOSIS — J18.9 COMMUNITY ACQUIRED PNEUMONIA, UNSPECIFIED LATERALITY: ICD-10-CM

## 2022-09-28 DIAGNOSIS — I77.1 SUBCLAVIAN ARTERIAL STENOSIS: ICD-10-CM

## 2022-09-28 DIAGNOSIS — J18.9 COMMUNITY ACQUIRED PNEUMONIA, UNSPECIFIED LATERALITY: Primary | ICD-10-CM

## 2022-09-28 DIAGNOSIS — Z79.899 DRUG-INDUCED IMMUNODEFICIENCY: ICD-10-CM

## 2022-09-28 PROCEDURE — 99214 PR OFFICE/OUTPT VISIT, EST, LEVL IV, 30-39 MIN: ICD-10-PCS | Mod: S$GLB,,, | Performed by: FAMILY MEDICINE

## 2022-09-28 PROCEDURE — 99999 PR PBB SHADOW E&M-EST. PATIENT-LVL III: ICD-10-PCS | Mod: PBBFAC,,, | Performed by: FAMILY MEDICINE

## 2022-09-28 PROCEDURE — 71046 XR CHEST PA AND LATERAL: ICD-10-PCS | Mod: 26,,, | Performed by: RADIOLOGY

## 2022-09-28 PROCEDURE — 71046 X-RAY EXAM CHEST 2 VIEWS: CPT | Mod: 26,,, | Performed by: RADIOLOGY

## 2022-09-28 PROCEDURE — 99214 OFFICE O/P EST MOD 30 MIN: CPT | Mod: S$GLB,,, | Performed by: FAMILY MEDICINE

## 2022-09-28 PROCEDURE — 71046 X-RAY EXAM CHEST 2 VIEWS: CPT | Mod: TC,FY,PO

## 2022-09-28 PROCEDURE — 99999 PR PBB SHADOW E&M-EST. PATIENT-LVL III: CPT | Mod: PBBFAC,,, | Performed by: FAMILY MEDICINE

## 2022-09-28 RX ORDER — DULOXETIN HYDROCHLORIDE 60 MG/1
60 CAPSULE, DELAYED RELEASE ORAL
COMMUNITY
Start: 2022-06-16 | End: 2022-09-28

## 2022-09-28 RX ORDER — PANTOPRAZOLE SODIUM 40 MG/1
40 TABLET, DELAYED RELEASE ORAL DAILY
COMMUNITY
Start: 2022-09-15

## 2022-09-28 NOTE — PROGRESS NOTES
"Subjective:       Patient ID: Kelly Dove is a 42 y.o. female.    Chief Complaint: Follow-up (ER- pneumonia )    Here today to f/u from recent ER visit.  She presented with chest pain and SOB.  She was found to have pneumonia.  She was discharged on Ceftin and Azithromycin.  She has completed the Zithromax at this time and is still on the Ceftin.  She has vasculitis and subclavian artery stenosis and is on immunosuppressant therapy.  She is holding her Methotrexate and Humira.  Overall she is starting to feel better, but having a bad day today.    Follow-up  Associated symptoms include arthralgias, chest pain, fatigue (sig for the past year), headaches and weakness. Pertinent negatives include no joint swelling, neck pain or vomiting.   Review of Systems   Constitutional:  Positive for activity change and fatigue (sig for the past year). Negative for unexpected weight change.   HENT:  Negative for hearing loss, rhinorrhea and trouble swallowing.    Eyes:  Negative for discharge and visual disturbance.   Respiratory:  Positive for chest tightness. Negative for wheezing.    Cardiovascular:  Positive for chest pain and palpitations.   Gastrointestinal:  Negative for blood in stool, constipation, diarrhea and vomiting.   Endocrine: Negative for polydipsia and polyuria.   Genitourinary:  Negative for difficulty urinating, dysuria, hematuria and menstrual problem.   Musculoskeletal:  Positive for arthralgias. Negative for joint swelling and neck pain.   Neurological:  Positive for weakness and headaches.   Psychiatric/Behavioral:  Negative for confusion and dysphoric mood.      Objective:      Vitals:    09/28/22 1454   BP: 132/68   Pulse: (!) 114   SpO2: 99%   Weight: (!) 158.3 kg (348 lb 15.8 oz)   Height: 5' 6" (1.676 m)      Physical Exam  Constitutional:       General: She is not in acute distress.     Appearance: She is obese.   Cardiovascular:      Rate and Rhythm: Normal rate and regular rhythm.      Heart " sounds: Normal heart sounds. No murmur heard.  Pulmonary:      Effort: Pulmonary effort is normal. No respiratory distress.      Breath sounds: Normal breath sounds. No wheezing, rhonchi or rales.   Skin:     General: Skin is warm and dry.   Neurological:      General: No focal deficit present.      Mental Status: She is alert.   Psychiatric:         Mood and Affect: Mood normal.         Behavior: Behavior normal.         Thought Content: Thought content normal.       Results for orders placed or performed during the hospital encounter of 09/21/22   POCT Troponin   Result Value Ref Range    POC Troponin I <0.01 0.00 - 0.08 ng/mL   POCT CMP   Result Value Ref Range    POC Sodium 144 128 - 145 mmol/L    POC Potassium 4.2 3.6 - 5.1 mmol/L    POC Chloride 104 98 - 108 mmol/L    POC CO2 28 18 - 33 mmol/L    POC Glucose 98 73 - 118 mg/dL    POC BUN 8 7 - 22 mg/dL    POC Creatinine 0.6 0.6 - 1.2 mg/dL    Calcium, POC 9.7 8.0 - 10.3 mg/dL    Albumin, POC 3.8 3.3 - 5.5 g/dL    POC ALT 47 10 - 47 U/L    POC AST 28 11 - 38 U/L    Alkaline Phosphatase, POC 78 42 - 141 U/L    POC TOTAL PROTEIN 7.1 6.4 - 8.1 g/dL    POC TOTAL BILIRUBIN 0.6 0.2 - 1.6 mg/dL    POC eGFR >60 61 - 2,000 mL/min    POC Hemolysis 0     Specimen Type BLNK    POCT CBC   Result Value Ref Range    POC WBC 8.3 3.9 - 12.7 K/uL    POC GRA% 68.8 38.0 - 73.0 %    POC MID% 6.4 4.0 - 15.0 %    POC LYM% 24.8 18.0 - 48.0 %    POC Gran# 5.7 1.8 - 7.7 K/uL    POC MID# 0.6 0.3 - 1.0 K/uL    POC LYM# 2.0 1.0 - 4.8 K/uL    POC RBC 4.73 4.00 - 5.40 M/uL    POC HGB 14.6 12.0 - 16.0 g/dL    POC MCV 89.5 82.0 - 98.0 fl    POC HCT 42.4 37.0 - 48.5 %    POC MCH 30.9 27.0 - 31.0 pg    POC MCHC 34.5 32.0 - 36.0 g/dL    POC RDW% 12.6 11.5 - 14.5 %    POC  150 - 450 K/uL    POC MPV 9.3 9.2 - 12.9 fl   POCT D Dimer   Result Value Ref Range    POC D-DI <100 0 - 599 ng/mL   POCT Protime-INR   Result Value Ref Range    POC PTINR 1.0 0.8 - 1.2   POCT Troponin in 2 Hours   Result  Value Ref Range    POC Troponin I <0.01 0.00 - 0.08 ng/mL      Assessment:       1. Community acquired pneumonia, unspecified laterality    2. Arteritis, Takayasu    3. Drug-induced immunodeficiency    4. Peripheral vascular disease, unspecified    5. Subclavian arterial stenosis    6. Severe obesity (BMI >= 40)        Plan:       Community acquired pneumonia, unspecified laterality  -     X-Ray Chest PA And Lateral; Future; Expected date: 09/28/2022    Arteritis, Takayasu    Drug-induced immunodeficiency    Peripheral vascular disease, unspecified    Subclavian arterial stenosis    Severe obesity (BMI >= 40)      Improving overall.  Lungs are clear today.  Finish antibiotics as discussed.  Recheck X-ray today.      Medication List with Changes/Refills   Current Medications    ACETAMINOPHEN (TYLENOL) 500 MG TABLET    Take 2 tablets (1,000 mg total) by mouth every 8 (eight) hours as needed for Pain.    ADALIMUMAB (HUMIRA PEN) PNKT INJECTION    Inject 1 pen (40 mg total) into the skin every 14 (fourteen) days.    ASPIRIN 81 MG CHEW    Take 81 mg by mouth once daily.    CALCIUM CARBONATE (TUMS) 200 MG CALCIUM (500 MG) CHEWABLE TABLET    Take 2 tablets (1,000 mg total) by mouth once daily.    CEFUROXIME (CEFTIN) 500 MG TABLET    Take 1 tablet (500 mg total) by mouth every 12 (twelve) hours. for 10 days    CYANOCOBALAMIN, VITAMIN B-12, (VITAMIN B-12 ORAL)    Take by mouth.    DULOXETINE (CYMBALTA) 60 MG CAPSULE    Take 1 capsule (60 mg total) by mouth 2 (two) times daily.    FLUTICASONE PROPIONATE (FLONASE) 50 MCG/ACTUATION NASAL SPRAY    1 spray (50 mcg total) by Each Nostril route once daily.    FOLIC ACID (FOLVITE) 1 MG TABLET    Take 1 tablet (1 mg total) by mouth 3 (three) times daily.    METHOTREXATE 25 MG/ML INJECTION    Inject 0.9 mL into the skin weekly    PANTOPRAZOLE (PROTONIX) 40 MG TABLET    Take 40 mg by mouth once daily.    PREGABALIN (LYRICA) 50 MG CAPSULE    Take 1 capsule (50 mg total) by mouth 2 (two)  times daily as needed (burning and tingling).    PROMETHAZINE (PHENERGAN) 25 MG TABLET    TAKE 1 TABLET(25 MG) BY MOUTH EVERY 4 HOURS. NO MORE THAN 4 TABLETS IN 24 HOURS    PROMETHAZINE-DEXTROMETHORPHAN (PROMETHAZINE-DM) 6.25-15 MG/5 ML SYRP    Take by mouth.    VITAMIN D (VITAMIN D3) 1000 UNITS TAB    Take 1 tablet (1,000 Units total) by mouth once daily.   Discontinued Medications    AZITHROMYCIN (Z-TOMI) 250 MG TABLET    Take 2 tablets by mouth on day 1; Take 1 tablet by mouth on days 2-5    DULOXETINE (CYMBALTA) 60 MG CAPSULE    Take 60 mg by mouth.

## 2022-10-17 ENCOUNTER — OFFICE VISIT (OUTPATIENT)
Dept: NEUROLOGY | Facility: CLINIC | Age: 42
End: 2022-10-17
Payer: COMMERCIAL

## 2022-10-17 ENCOUNTER — OFFICE VISIT (OUTPATIENT)
Dept: FAMILY MEDICINE | Facility: CLINIC | Age: 42
End: 2022-10-17
Payer: COMMERCIAL

## 2022-10-17 VITALS
SYSTOLIC BLOOD PRESSURE: 124 MMHG | WEIGHT: 293 LBS | HEIGHT: 66 IN | BODY MASS INDEX: 47.09 KG/M2 | OXYGEN SATURATION: 95 % | DIASTOLIC BLOOD PRESSURE: 78 MMHG | HEART RATE: 117 BPM

## 2022-10-17 VITALS — BODY MASS INDEX: 47.09 KG/M2 | HEIGHT: 66 IN | WEIGHT: 293 LBS

## 2022-10-17 DIAGNOSIS — R51.9 NONINTRACTABLE HEADACHE, UNSPECIFIED CHRONICITY PATTERN, UNSPECIFIED HEADACHE TYPE: ICD-10-CM

## 2022-10-17 DIAGNOSIS — G57.30 PERONEAL NEUROPATHY, UNSPECIFIED LATERALITY: ICD-10-CM

## 2022-10-17 DIAGNOSIS — M31.4 ARTERITIS, TAKAYASU: ICD-10-CM

## 2022-10-17 DIAGNOSIS — Z12.31 SCREENING MAMMOGRAM FOR BREAST CANCER: ICD-10-CM

## 2022-10-17 DIAGNOSIS — E66.01 SEVERE OBESITY (BMI >= 40): ICD-10-CM

## 2022-10-17 DIAGNOSIS — I10 BENIGN ESSENTIAL HTN: Primary | ICD-10-CM

## 2022-10-17 DIAGNOSIS — Z23 NEED FOR VACCINATION: ICD-10-CM

## 2022-10-17 DIAGNOSIS — G62.9 NEUROPATHY: Primary | ICD-10-CM

## 2022-10-17 DIAGNOSIS — I77.1 SUBCLAVIAN ARTERIAL STENOSIS: ICD-10-CM

## 2022-10-17 DIAGNOSIS — G47.33 OSA (OBSTRUCTIVE SLEEP APNEA): ICD-10-CM

## 2022-10-17 PROCEDURE — 0124A COVID-19, MRNA, LNP-S, BIVALENT BOOSTER, PF, 30 MCG/0.3 ML DOSE: CPT | Mod: PBBFAC | Performed by: FAMILY MEDICINE

## 2022-10-17 PROCEDURE — 90471 IMMUNIZATION ADMIN: CPT | Mod: S$GLB,,, | Performed by: FAMILY MEDICINE

## 2022-10-17 PROCEDURE — 0124A PR IMMUNIZ ADMIN, SARS-COV- 2 COVID-19 VACCINE, 30MCG/0.3ML, BIVALENT, BOOSTER DOSE: CPT | Mod: PBBFAC | Performed by: FAMILY MEDICINE

## 2022-10-17 PROCEDURE — 91312: CPT | Mod: S$GLB,,, | Performed by: FAMILY MEDICINE

## 2022-10-17 PROCEDURE — 99999 PR PBB SHADOW E&M-EST. PATIENT-LVL IV: CPT | Mod: PBBFAC,,, | Performed by: FAMILY MEDICINE

## 2022-10-17 PROCEDURE — 90471 FLU VACCINE (QUAD) GREATER THAN OR EQUAL TO 3YO PRESERVATIVE FREE IM: ICD-10-PCS | Mod: S$GLB,,, | Performed by: FAMILY MEDICINE

## 2022-10-17 PROCEDURE — 99214 OFFICE O/P EST MOD 30 MIN: CPT | Mod: PBBFAC,27 | Performed by: PSYCHIATRY & NEUROLOGY

## 2022-10-17 PROCEDURE — 99999 PR PBB SHADOW E&M-EST. PATIENT-LVL IV: ICD-10-PCS | Mod: PBBFAC,,, | Performed by: FAMILY MEDICINE

## 2022-10-17 PROCEDURE — 90686 FLU VACCINE (QUAD) GREATER THAN OR EQUAL TO 3YO PRESERVATIVE FREE IM: ICD-10-PCS | Mod: S$GLB,,, | Performed by: FAMILY MEDICINE

## 2022-10-17 PROCEDURE — 99999 PR PBB SHADOW E&M-EST. PATIENT-LVL IV: CPT | Mod: PBBFAC,,, | Performed by: PSYCHIATRY & NEUROLOGY

## 2022-10-17 PROCEDURE — 90686 IIV4 VACC NO PRSV 0.5 ML IM: CPT | Mod: PBBFAC,PO | Performed by: FAMILY MEDICINE

## 2022-10-17 PROCEDURE — 99214 PR OFFICE/OUTPT VISIT, EST, LEVL IV, 30-39 MIN: ICD-10-PCS | Mod: S$GLB,,, | Performed by: PSYCHIATRY & NEUROLOGY

## 2022-10-17 PROCEDURE — 99999 PR PBB SHADOW E&M-EST. PATIENT-LVL IV: ICD-10-PCS | Mod: PBBFAC,,, | Performed by: PSYCHIATRY & NEUROLOGY

## 2022-10-17 PROCEDURE — 99214 OFFICE O/P EST MOD 30 MIN: CPT | Mod: S$GLB,,, | Performed by: PSYCHIATRY & NEUROLOGY

## 2022-10-17 PROCEDURE — 91312: ICD-10-PCS | Mod: S$GLB,,, | Performed by: FAMILY MEDICINE

## 2022-10-17 PROCEDURE — 99214 OFFICE O/P EST MOD 30 MIN: CPT | Mod: PBBFAC,PO | Performed by: FAMILY MEDICINE

## 2022-10-17 PROCEDURE — 99214 PR OFFICE/OUTPT VISIT, EST, LEVL IV, 30-39 MIN: ICD-10-PCS | Mod: 25,S$GLB,, | Performed by: FAMILY MEDICINE

## 2022-10-17 PROCEDURE — 91312 COVID-19, MRNA, LNP-S, BIVALENT BOOSTER, PF, 30 MCG/0.3 ML DOSE: CPT | Mod: PBBFAC,PO | Performed by: FAMILY MEDICINE

## 2022-10-17 PROCEDURE — 99214 OFFICE O/P EST MOD 30 MIN: CPT | Mod: 25,S$GLB,, | Performed by: FAMILY MEDICINE

## 2022-10-17 RX ORDER — BUTALBITAL, ACETAMINOPHEN AND CAFFEINE 50; 325; 40 MG/1; MG/1; MG/1
1 TABLET ORAL EVERY 6 HOURS PRN
Qty: 10 TABLET | Refills: 2 | Status: SHIPPED | OUTPATIENT
Start: 2022-10-17 | End: 2022-11-16

## 2022-10-17 RX ORDER — PREGABALIN 100 MG/1
100 CAPSULE ORAL 2 TIMES DAILY
Qty: 180 CAPSULE | Refills: 1 | Status: SHIPPED | OUTPATIENT
Start: 2022-10-17 | End: 2023-01-24

## 2022-10-17 NOTE — PROGRESS NOTES
[facility] - NEUROLOGY 7TH FL [facility], [place]    Date: October 17, 2022   Patient Name: Kelly Dove   MRN: [id number]   PCP: Horacio Erazo  Referring Provider: No ref. provider found    Assessment:      This is Kelly Dove, 42 y.o. female with recent diagnosis of Tayakasu arteritis and diffuse paresthesias - MRI brain and Cspine 4/2022 normal and EMG 5/2022 without large fiber neuropathy     Plan:      -  Punch biopsy  -  Increase PGB 50->100mg bid prn  -  Increase cymbalta to 60mg bid  -  Fioricet prn, Ubrelvy if no effect, cannot take triptan due to arteritis    Follow up 6 months       Greater than 30 minutes spent in chart review, documentation, independent review of imaging, and face to face time with patient    I discussed side effects of the medications. I asked the patient to stop the medication if she notices serious adverse effects as we discussed and to seek immediate medical attention at an ER.     Braden Palmer MD  [facility]   Department of Neurology    Subjective:     -  Improvement in paresthesias with increase cymbalta to 60mg bid and PGB to 50mg bid, symptoms no longer interfere with sleep but sometimes problematic  -  Severe HA without associated features lasting at least several hours about once a month, prior history of HA but now more severe and more frequent    6/2022  -  Partial improvement in paresthesias with increased cymbalta but still prominent enough to interfere with sleep, some ongoing difficulty with fine motor ability, improvement in balance       HPI 3/2022:   Ms. Kelly Dove is a 42 y.o. female who presents with a chief complaint of paresthesias    Patient believes she began experiencing some right arm clumsiness early to mid 2021 but did not seek specific evaluation until she developed chest pain prompting hospitalization 10/2021 with CTA showing severe stenosis of right subclavian artery leading to diagnosis of  Takayasu arteritis.  She was started on MTX and prednisone taper which she has completed and started Humira earlier this months.    Shortly after discharge she developed diffuse paresthesias in the hands, feet, and face - these have not subsided thus far despite current treatment regiment of underlying disease.  She was prescribed GBP which did not alleviate symptoms and led to development of LE edema.  She was started on cymbalta about two months ago which has not alleviated symptoms.      PAST MEDICAL HISTORY:  Past Medical History:   Diagnosis Date    Takayasu's arteritis        PAST SURGICAL HISTORY:  Past Surgical History:   Procedure Laterality Date    ABDOMINAL SURGERY      BREAST BIOPSY      HERNIA REPAIR      HYSTERECTOMY      OOPHORECTOMY      TONSILLECTOMY         CURRENT MEDS:  Current Outpatient Medications   Medication Sig Dispense Refill    acetaminophen (TYLENOL) 500 MG tablet Take 2 tablets (1,000 mg total) by mouth every 8 (eight) hours as needed for Pain. 42 tablet 0    adalimumab (HUMIRA PEN) PnKt injection Inject 1 pen (40 mg total) into the skin every 14 (fourteen) days. 2 pen 11    aspirin 81 MG Chew Take 81 mg by mouth once daily.      calcium carbonate (TUMS) 200 mg calcium (500 mg) chewable tablet Take 2 tablets (1,000 mg total) by mouth once daily. 60 tablet 11    cyanocobalamin, vitamin B-12, (VITAMIN B-12 ORAL) Take by mouth.      DULoxetine (CYMBALTA) 60 MG capsule Take 1 capsule (60 mg total) by mouth 2 (two) times daily. 180 capsule 3    fluticasone propionate (FLONASE) 50 mcg/actuation nasal spray 1 spray (50 mcg total) by Each Nostril route once daily. 16 g 3    folic acid (FOLVITE) 1 MG tablet Take 1 tablet (1 mg total) by mouth 3 (three) times daily. 90 tablet 4    gabapentin (NEURONTIN) 100 MG capsule TAKE 1 CAPSULE(100 MG) BY MOUTH THREE TIMES DAILY 90 capsule 3    methotrexate 25 mg/mL injection Inject 0.9 mL into the skin weekly 10 mL 1    pantoprazole (PROTONIX) 40 MG tablet  "Take 40 mg by mouth once daily.      pregabalin (LYRICA) 50 MG capsule Take 1 capsule (50 mg total) by mouth 2 (two) times daily as needed (burning and tingling). 60 capsule 5    promethazine (PHENERGAN) 25 MG tablet TAKE 1 TABLET(25 MG) BY MOUTH EVERY 4 HOURS. NO MORE THAN 4 TABLETS IN 24 HOURS 60 tablet 0    promethazine-dextromethorphan (PROMETHAZINE-DM) 6.25-15 mg/5 mL Syrp Take by mouth.      vitamin D (VITAMIN D3) 1000 units Tab Take 1 tablet (1,000 Units total) by mouth once daily. 30 tablet 2     No current facility-administered medications for this visit.       ALLERGIES:  Review of patient's allergies indicates:  No Known Allergies    FAMILY HISTORY:  Family History   Problem Relation Age of Onset    Ovarian cancer Mother        SOCIAL HISTORY:  Social History     Tobacco Use    Smoking status: Former     Packs/day: 0.75     Types: Cigarettes     Quit date: 2022     Years since quittin.0    Smokeless tobacco: Never   Substance Use Topics    Alcohol use: Yes     Comment: social    Drug use: Not Currently       Review of Systems:  12 review of systems is negative except for the symptoms mentioned in HPI.        Objective:     Vitals:    10/17/22 1051   Weight: (!) 158.3 kg (348 lb 15.8 oz)   Height: 5' 6" (1.676 m)       General: NAD, well nourished   Eyes: no tearing, discharge, no erythema   ENT: moist mucous membranes of the oral cavity, nares patent    Neck: Supple, full range of motion  Cardiovascular: Warm and well perfused, pulses decreased in right versus left in upper more than lower extremity  Lungs: Normal work of breathing, normal chest wall excursions  Skin: No rash, lesions, or breakdown on exposed skin  Psychiatry: Mood and affect are appropriate   Abdomen: soft, non tender, non distended  Extremeties: No cyanosis, clubbing or edema.    Neurological   MENTAL STATUS: Alert and oriented to person, place, and time. Attention and concentration within normal limits. Speech without " dysarthria, able to name and repeat without difficulty. Recent and remote memory within normal limits   CRANIAL NERVES: Visual fields intact. PERRL. EOMI. Facial sensation intact. Face symmetrical. Hearing grossly intact. Full shoulder shrug bilaterally. Tongue protrudes midline   SENSORY: Sensation is intact to light touch  MOTOR: Normal bulk and tone. No pronator drift.  Mild weakness in right hand intrinsics  REFLEXES: DTR quiet throughout  CEREBELLAR/COORDINATION/GAIT: Gait steady with normal arm swing and stride length.  Finger to nose intact. Normal rapid alternating movements.

## 2022-10-17 NOTE — PATIENT INSTRUCTIONS
INCREASE LYRICA TO 100MG TWICE DAILY    TAKE FIORICET AS NEEDED FOR HEADACHE, CALL OR MESSAGE IF THIS IS NOT HELPFUL    FOLLOW UP FOR PUNCH BIOPSY

## 2022-10-17 NOTE — PROGRESS NOTES
"Subjective:       Patient ID: Kelly Dove is a 42 y.o. female.    Chief Complaint: Follow-up (Hypertension ) and Fatigue    Here today to follow up on chronic medical conditions.     HTN:  She is diet controlled and her BP has been controlled.  Her HR is elevated today.  Perpherial Neuropathy:  Saw neurology today.  Lyrica increased to 100 mg BID to be taken with her Cymbalta 120 mg.    Of note, she quit smoking.      Follow-up  Associated symptoms include fatigue. Pertinent negatives include no abdominal pain, chest pain, coughing, fever, headaches, nausea or rash.   Fatigue  Associated symptoms include fatigue. Pertinent negatives include no abdominal pain, chest pain, coughing, fever, headaches, nausea or rash.   Review of Systems   Constitutional:  Positive for fatigue. Negative for activity change, appetite change and fever.   Respiratory:  Negative for cough, shortness of breath and wheezing.    Cardiovascular:  Negative for chest pain and palpitations.   Gastrointestinal:  Negative for abdominal pain, constipation, diarrhea and nausea.   Skin:  Negative for pallor and rash.   Neurological:  Negative for dizziness, syncope, light-headedness and headaches.   Psychiatric/Behavioral:  The patient is not nervous/anxious.      Objective:      Vitals:    10/17/22 1327   BP: 124/78   Pulse: (!) 117   SpO2: 95%   Weight: (!) 159.3 kg (351 lb 3.1 oz)   Height: 5' 6" (1.676 m)      Physical Exam  Constitutional:       General: She is not in acute distress.     Appearance: She is obese.   Cardiovascular:      Rate and Rhythm: Normal rate and regular rhythm.      Heart sounds: Normal heart sounds. No murmur heard.  Pulmonary:      Effort: Pulmonary effort is normal. No respiratory distress.      Breath sounds: Normal breath sounds. No wheezing, rhonchi or rales.   Musculoskeletal:         General: No swelling.   Skin:     General: Skin is warm and dry.   Neurological:      General: No focal deficit present.      " Mental Status: She is alert.   Psychiatric:         Mood and Affect: Mood normal.         Behavior: Behavior normal.         Thought Content: Thought content normal.          Assessment:       1. Benign essential HTN    2. Arteritis, Takayasu    3. Severe obesity (BMI >= 40)    4. Subclavian arterial stenosis    5. FUNMILAYO (obstructive sleep apnea)    6. Peroneal neuropathy, unspecified laterality    7. Screening mammogram for breast cancer    8. Need for vaccination        Plan:       Benign essential HTN    Arteritis, Takayasu    Severe obesity (BMI >= 40)    Subclavian arterial stenosis    FUNMILAYO (obstructive sleep apnea)    Peroneal neuropathy, unspecified laterality    Screening mammogram for breast cancer  -     Mammo Digital Screening Bilat w/ Ale[facility]andro; Future; Expected date: 12/14/2022    Need for vaccination  -     COVID-19-MRNA-(PF)(Pfizer Omicron) Vaccine    Other orders  -     Influenza - Quadrivalent (PF)     Flu and COVID today.  Orders for MMG to be done in Dec.  She had recent lab work, so will not repeat today.  She has an appt with Cardiology and will address her elevated HR.  We discussed possible causes for her fatigue and medication may be one of them.  Her Lyrica is helping but may be the cause.   Medication List with Changes/Refills   Current Medications    ACETAMINOPHEN (TYLENOL) 500 MG TABLET    Take 2 tablets (1,000 mg total) by mouth every 8 (eight) hours as needed for Pain.    ADALIMUMAB (HUMIRA PEN) PNKT INJECTION    Inject 1 pen (40 mg total) into the skin every 14 (fourteen) days.    ASPIRIN 81 MG CHEW    Take 81 mg by mouth once daily.    BUTALBITAL-ACETAMINOPHEN-CAFFEINE -40 MG (FIORICET, ESGIC) -40 MG PER TABLET    Take 1 tablet by mouth every 6 (six) hours as needed for Headaches.    CALCIUM CARBONATE (TUMS) 200 MG CALCIUM (500 MG) CHEWABLE TABLET    Take 2 tablets (1,000 mg total) by mouth once daily.    CYANOCOBALAMIN, VITAMIN B-12, (VITAMIN B-12 ORAL)    Take by mouth.     DULOXETINE (CYMBALTA) 60 MG CAPSULE    Take 1 capsule (60 mg total) by mouth 2 (two) times daily.    FLUTICASONE PROPIONATE (FLONASE) 50 MCG/ACTUATION NASAL SPRAY    1 spray (50 mcg total) by Each Nostril route once daily.    FOLIC ACID (FOLVITE) 1 MG TABLET    Take 1 tablet (1 mg total) by mouth 3 (three) times daily.    METHOTREXATE 25 MG/ML INJECTION    Inject 0.9 mL into the skin weekly    PANTOPRAZOLE (PROTONIX) 40 MG TABLET    Take 40 mg by mouth once daily.    PREGABALIN (LYRICA) 100 MG CAPSULE    Take 1 capsule (100 mg total) by mouth 2 (two) times daily.    PROMETHAZINE (PHENERGAN) 25 MG TABLET    TAKE 1 TABLET(25 MG) BY MOUTH EVERY 4 HOURS. NO MORE THAN 4 TABLETS IN 24 HOURS    VITAMIN D (VITAMIN D3) 1000 UNITS TAB    Take 1 tablet (1,000 Units total) by mouth once daily.   Discontinued Medications    GABAPENTIN (NEURONTIN) 100 MG CAPSULE    TAKE 1 CAPSULE(100 MG) BY MOUTH THREE TIMES DAILY    PROMETHAZINE-DEXTROMETHORPHAN (PROMETHAZINE-DM) 6.25-15 MG/5 ML SYRP    Take by mouth.

## 2022-10-18 ENCOUNTER — TELEPHONE (OUTPATIENT)
Dept: NEUROLOGY | Facility: CLINIC | Age: 42
End: 2022-10-18
Payer: COMMERCIAL

## 2022-10-18 NOTE — TELEPHONE ENCOUNTER
Called patient left a  detailed message letting her  know that she is scheduled for  11/1/2022 at  1pm for  a punch biopsy and to give the  office a called back if she had any questions

## 2022-10-19 ENCOUNTER — OFFICE VISIT (OUTPATIENT)
Dept: CARDIOLOGY | Facility: CLINIC | Age: 42
End: 2022-10-19
Payer: COMMERCIAL

## 2022-10-19 VITALS
WEIGHT: 293 LBS | BODY MASS INDEX: 47.09 KG/M2 | DIASTOLIC BLOOD PRESSURE: 98 MMHG | SYSTOLIC BLOOD PRESSURE: 156 MMHG | HEIGHT: 66 IN | HEART RATE: 106 BPM

## 2022-10-19 DIAGNOSIS — I10 BENIGN ESSENTIAL HTN: ICD-10-CM

## 2022-10-19 DIAGNOSIS — M31.4 ARTERITIS, TAKAYASU: ICD-10-CM

## 2022-10-19 DIAGNOSIS — I73.9 PVD (PERIPHERAL VASCULAR DISEASE): Primary | ICD-10-CM

## 2022-10-19 PROCEDURE — 1159F MED LIST DOCD IN RCRD: CPT | Mod: CPTII,S$GLB,, | Performed by: INTERNAL MEDICINE

## 2022-10-19 PROCEDURE — 99204 OFFICE O/P NEW MOD 45 MIN: CPT | Mod: S$GLB,,, | Performed by: INTERNAL MEDICINE

## 2022-10-19 PROCEDURE — 3080F PR MOST RECENT DIASTOLIC BLOOD PRESSURE >= 90 MM HG: ICD-10-PCS | Mod: CPTII,S$GLB,, | Performed by: INTERNAL MEDICINE

## 2022-10-19 PROCEDURE — 3080F DIAST BP >= 90 MM HG: CPT | Mod: CPTII,S$GLB,, | Performed by: INTERNAL MEDICINE

## 2022-10-19 PROCEDURE — 1160F RVW MEDS BY RX/DR IN RCRD: CPT | Mod: CPTII,S$GLB,, | Performed by: INTERNAL MEDICINE

## 2022-10-19 PROCEDURE — 99213 OFFICE O/P EST LOW 20 MIN: CPT | Mod: PBBFAC,PO | Performed by: INTERNAL MEDICINE

## 2022-10-19 PROCEDURE — 99999 PR PBB SHADOW E&M-EST. PATIENT-LVL III: ICD-10-PCS | Mod: PBBFAC,,, | Performed by: INTERNAL MEDICINE

## 2022-10-19 PROCEDURE — 99204 PR OFFICE/OUTPT VISIT, NEW, LEVL IV, 45-59 MIN: ICD-10-PCS | Mod: S$GLB,,, | Performed by: INTERNAL MEDICINE

## 2022-10-19 PROCEDURE — 3077F PR MOST RECENT SYSTOLIC BLOOD PRESSURE >= 140 MM HG: ICD-10-PCS | Mod: CPTII,S$GLB,, | Performed by: INTERNAL MEDICINE

## 2022-10-19 PROCEDURE — 1159F PR MEDICATION LIST DOCUMENTED IN MEDICAL RECORD: ICD-10-PCS | Mod: CPTII,S$GLB,, | Performed by: INTERNAL MEDICINE

## 2022-10-19 PROCEDURE — 99999 PR PBB SHADOW E&M-EST. PATIENT-LVL III: CPT | Mod: PBBFAC,,, | Performed by: INTERNAL MEDICINE

## 2022-10-19 PROCEDURE — 1160F PR REVIEW ALL MEDS BY PRESCRIBER/CLIN PHARMACIST DOCUMENTED: ICD-10-PCS | Mod: CPTII,S$GLB,, | Performed by: INTERNAL MEDICINE

## 2022-10-19 PROCEDURE — 3077F SYST BP >= 140 MM HG: CPT | Mod: CPTII,S$GLB,, | Performed by: INTERNAL MEDICINE

## 2022-10-19 NOTE — PROGRESS NOTES
Subjective:    Patient ID:  Kelly Dove is a 42 y.o. female who presents for evaluation of Abnormal ECG and Palpitations      HPI  She was diagnosed with takayasu's disease last year due to occlusion of the right subclavian artery.  She comes in now with episodes of palpitations and rapid heartbeat as well as shortness of breath when trying to do some exercise.  No chest pain as such.  No syncope.  On methotrexate for the takayasu's.  Not on steroids at this time.  Quit smoking month ago    Review of Systems   Constitutional: Negative for decreased appetite, malaise/fatigue, weight gain and weight loss.   Cardiovascular:  Negative for chest pain, dyspnea on exertion, leg swelling, palpitations and syncope.   Respiratory:  Negative for cough and shortness of breath.    Gastrointestinal: Negative.    Neurological:  Negative for weakness.   All other systems reviewed and are negative.     Objective:      Physical Exam  Vitals and nursing note reviewed.   Constitutional:       Appearance: Normal appearance. She is well-developed.   HENT:      Head: Normocephalic.   Eyes:      Pupils: Pupils are equal, round, and reactive to light.   Neck:      Thyroid: No thyromegaly.      Vascular: No carotid bruit or JVD.   Cardiovascular:      Rate and Rhythm: Normal rate and regular rhythm.      Chest Wall: PMI is not displaced.      Pulses: Normal pulses and intact distal pulses.      Heart sounds: Normal heart sounds. No murmur heard.    No gallop.   Pulmonary:      Effort: Pulmonary effort is normal.      Breath sounds: Normal breath sounds.   Abdominal:      Palpations: Abdomen is soft. There is no mass.      Tenderness: There is no abdominal tenderness.   Musculoskeletal:         General: Normal range of motion.      Cervical back: Normal range of motion and neck supple.   Skin:     General: Skin is warm.   Neurological:      Mental Status: She is alert and oriented to person, place, and time.      Sensory: No sensory  deficit.      Deep Tendon Reflexes: Reflexes are normal and symmetric.         Assessment:       1. PVD (peripheral vascular disease)    2. Benign essential HTN    3. Arteritis, Takayasu         Plan:     Echocardiogram.  Call with results.  Will try low-dose Cardizem if echocardiogram is normal  Follow-up in 3 months

## 2022-10-21 ENCOUNTER — SPECIALTY PHARMACY (OUTPATIENT)
Dept: PHARMACY | Facility: CLINIC | Age: 42
End: 2022-10-21
Payer: COMMERCIAL

## 2022-10-21 NOTE — TELEPHONE ENCOUNTER
Patient had pneumonia, therefore is one month ahead on both her Humira and methotrexate. She does not need any meds until 11/21/22. Will pend both refill calls out to 11/14/22.

## 2022-10-24 ENCOUNTER — OFFICE VISIT (OUTPATIENT)
Dept: RHEUMATOLOGY | Facility: CLINIC | Age: 42
End: 2022-10-24
Payer: COMMERCIAL

## 2022-10-24 ENCOUNTER — LAB VISIT (OUTPATIENT)
Dept: LAB | Facility: HOSPITAL | Age: 42
End: 2022-10-24
Attending: INTERNAL MEDICINE
Payer: COMMERCIAL

## 2022-10-24 VITALS
SYSTOLIC BLOOD PRESSURE: 154 MMHG | WEIGHT: 293 LBS | TEMPERATURE: 98 F | BODY MASS INDEX: 47.09 KG/M2 | DIASTOLIC BLOOD PRESSURE: 99 MMHG | HEIGHT: 66 IN | HEART RATE: 105 BPM

## 2022-10-24 DIAGNOSIS — M31.4 ARTERITIS, TAKAYASU: Primary | ICD-10-CM

## 2022-10-24 DIAGNOSIS — R11.0 NAUSEA: ICD-10-CM

## 2022-10-24 DIAGNOSIS — M31.4 ARTERITIS, TAKAYASU: ICD-10-CM

## 2022-10-24 LAB
ALBUMIN SERPL BCP-MCNC: 4 G/DL (ref 3.5–5.2)
ALP SERPL-CCNC: 71 U/L (ref 55–135)
ALT SERPL W/O P-5'-P-CCNC: 71 U/L (ref 10–44)
ANION GAP SERPL CALC-SCNC: 8 MMOL/L (ref 8–16)
AST SERPL-CCNC: 36 U/L (ref 10–40)
BASOPHILS # BLD AUTO: 0.03 K/UL (ref 0–0.2)
BASOPHILS NFR BLD: 0.6 % (ref 0–1.9)
BILIRUB SERPL-MCNC: 0.7 MG/DL (ref 0.1–1)
BUN SERPL-MCNC: 8 MG/DL (ref 6–20)
CALCIUM SERPL-MCNC: 9.6 MG/DL (ref 8.7–10.5)
CHLORIDE SERPL-SCNC: 104 MMOL/L (ref 95–110)
CO2 SERPL-SCNC: 27 MMOL/L (ref 23–29)
CREAT SERPL-MCNC: 0.6 MG/DL (ref 0.5–1.4)
CRP SERPL-MCNC: 2.8 MG/L (ref 0–8.2)
DIFFERENTIAL METHOD: NORMAL
EOSINOPHIL # BLD AUTO: 0.2 K/UL (ref 0–0.5)
EOSINOPHIL NFR BLD: 3.8 % (ref 0–8)
ERYTHROCYTE [DISTWIDTH] IN BLOOD BY AUTOMATED COUNT: 12.6 % (ref 11.5–14.5)
ERYTHROCYTE [SEDIMENTATION RATE] IN BLOOD BY PHOTOMETRIC METHOD: 16 MM/HR (ref 0–36)
EST. GFR  (NO RACE VARIABLE): >60 ML/MIN/1.73 M^2
GLUCOSE SERPL-MCNC: 97 MG/DL (ref 70–110)
HCT VFR BLD AUTO: 44.7 % (ref 37–48.5)
HGB BLD-MCNC: 14.5 G/DL (ref 12–16)
IMM GRANULOCYTES # BLD AUTO: 0.01 K/UL (ref 0–0.04)
IMM GRANULOCYTES NFR BLD AUTO: 0.2 % (ref 0–0.5)
LYMPHOCYTES # BLD AUTO: 2 K/UL (ref 1–4.8)
LYMPHOCYTES NFR BLD: 42.3 % (ref 18–48)
MCH RBC QN AUTO: 30.8 PG (ref 27–31)
MCHC RBC AUTO-ENTMCNC: 32.4 G/DL (ref 32–36)
MCV RBC AUTO: 95 FL (ref 82–98)
MONOCYTES # BLD AUTO: 0.4 K/UL (ref 0.3–1)
MONOCYTES NFR BLD: 8.1 % (ref 4–15)
NEUTROPHILS # BLD AUTO: 2.1 K/UL (ref 1.8–7.7)
NEUTROPHILS NFR BLD: 45 % (ref 38–73)
NRBC BLD-RTO: 0 /100 WBC
PLATELET # BLD AUTO: 201 K/UL (ref 150–450)
PMV BLD AUTO: 11.1 FL (ref 9.2–12.9)
POTASSIUM SERPL-SCNC: 4.2 MMOL/L (ref 3.5–5.1)
PROT SERPL-MCNC: 7 G/DL (ref 6–8.4)
RBC # BLD AUTO: 4.71 M/UL (ref 4–5.4)
SODIUM SERPL-SCNC: 139 MMOL/L (ref 136–145)
WBC # BLD AUTO: 4.68 K/UL (ref 3.9–12.7)

## 2022-10-24 PROCEDURE — 1160F PR REVIEW ALL MEDS BY PRESCRIBER/CLIN PHARMACIST DOCUMENTED: ICD-10-PCS | Mod: CPTII,S$GLB,, | Performed by: INTERNAL MEDICINE

## 2022-10-24 PROCEDURE — 86140 C-REACTIVE PROTEIN: CPT | Performed by: INTERNAL MEDICINE

## 2022-10-24 PROCEDURE — 3077F SYST BP >= 140 MM HG: CPT | Mod: CPTII,S$GLB,, | Performed by: INTERNAL MEDICINE

## 2022-10-24 PROCEDURE — 1160F RVW MEDS BY RX/DR IN RCRD: CPT | Mod: CPTII,S$GLB,, | Performed by: INTERNAL MEDICINE

## 2022-10-24 PROCEDURE — 85652 RBC SED RATE AUTOMATED: CPT | Performed by: INTERNAL MEDICINE

## 2022-10-24 PROCEDURE — 80053 COMPREHEN METABOLIC PANEL: CPT | Performed by: INTERNAL MEDICINE

## 2022-10-24 PROCEDURE — 85025 COMPLETE CBC W/AUTO DIFF WBC: CPT | Performed by: INTERNAL MEDICINE

## 2022-10-24 PROCEDURE — 36415 COLL VENOUS BLD VENIPUNCTURE: CPT | Performed by: INTERNAL MEDICINE

## 2022-10-24 PROCEDURE — 99214 OFFICE O/P EST MOD 30 MIN: CPT | Mod: S$GLB,,, | Performed by: INTERNAL MEDICINE

## 2022-10-24 PROCEDURE — 99999 PR PBB SHADOW E&M-EST. PATIENT-LVL IV: ICD-10-PCS | Mod: PBBFAC,,, | Performed by: INTERNAL MEDICINE

## 2022-10-24 PROCEDURE — 3080F PR MOST RECENT DIASTOLIC BLOOD PRESSURE >= 90 MM HG: ICD-10-PCS | Mod: CPTII,S$GLB,, | Performed by: INTERNAL MEDICINE

## 2022-10-24 PROCEDURE — 1159F MED LIST DOCD IN RCRD: CPT | Mod: CPTII,S$GLB,, | Performed by: INTERNAL MEDICINE

## 2022-10-24 PROCEDURE — 99999 PR PBB SHADOW E&M-EST. PATIENT-LVL IV: CPT | Mod: PBBFAC,,, | Performed by: INTERNAL MEDICINE

## 2022-10-24 PROCEDURE — 3080F DIAST BP >= 90 MM HG: CPT | Mod: CPTII,S$GLB,, | Performed by: INTERNAL MEDICINE

## 2022-10-24 PROCEDURE — 99214 PR OFFICE/OUTPT VISIT, EST, LEVL IV, 30-39 MIN: ICD-10-PCS | Mod: S$GLB,,, | Performed by: INTERNAL MEDICINE

## 2022-10-24 PROCEDURE — 1159F PR MEDICATION LIST DOCUMENTED IN MEDICAL RECORD: ICD-10-PCS | Mod: CPTII,S$GLB,, | Performed by: INTERNAL MEDICINE

## 2022-10-24 PROCEDURE — 3077F PR MOST RECENT SYSTOLIC BLOOD PRESSURE >= 140 MM HG: ICD-10-PCS | Mod: CPTII,S$GLB,, | Performed by: INTERNAL MEDICINE

## 2022-10-24 RX ORDER — METHOTREXATE 25 MG/ML
INJECTION, SOLUTION INTRA-ARTERIAL; INTRAMUSCULAR; INTRAVENOUS
Qty: 10 ML | Refills: 1 | Status: SHIPPED | OUTPATIENT
Start: 2022-10-24

## 2022-10-24 RX ORDER — PROMETHAZINE HYDROCHLORIDE 25 MG/1
TABLET ORAL
Qty: 20 TABLET | Refills: 0 | Status: SHIPPED | OUTPATIENT
Start: 2022-10-24 | End: 2023-03-22

## 2022-10-24 ASSESSMENT — ROUTINE ASSESSMENT OF PATIENT INDEX DATA (RAPID3)
PATIENT GLOBAL ASSESSMENT SCORE: 6
MDHAQ FUNCTION SCORE: 1
FATIGUE SCORE: 7.5
PAIN SCORE: 5
TOTAL RAPID3 SCORE: 4.78
PSYCHOLOGICAL DISTRESS SCORE: 1.1
AM STIFFNESS SCORE: 0, NO

## 2022-10-24 NOTE — PROGRESS NOTES
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 The right median antidromic sensory and orthodromic motor evaluations are within normal limits.     Small fibre neuropathy- discussed by neurology- she defers to a later date    Neurology- increased cymbalta to 60 mg bid  lyrica 25 mg bid prn as needed offered    Vit b12 : 345  b1 nml  CHAR,SSA,SSB neg  TSH nml    BP- one arm 119/85 and the other arm 152/97    No gain in weight  She has lost 10 pounds and then she gained some back    Vascular- 3/2022   has seen her- surgery not an option  He thinks every 18 months follow up with PPGs,CAROTID AND SUBCLAVIAN US- will be sufficient    On aspirin 81 mg daily  Trying to quit smoking      2/2022    Whole body pain  covid feb 1rst- 9 days- sinus stuffiness, fatigue    2/10    CRP 13.3  ESR nml    We missed mtx for 10 days due to covid and the CRP might be high due to the covid and not being on mtx    On mtx 9 pills weekly  Folic acid daily    On prednisone 5 mg until she got covid  Now off it    BP today 161/104    Sleep study today  Neurology appointment pending    Many scans were done in 12/2021      VAS US arterial arms    Rt thumb doppler waveform - PPG:   minimally dampened   Rt index finger doppler waveform - PPG:    minimally dampened   Rt  middle finger doppler waveform - PPG:   minimally dampened   Rt ring finger doppler waveform - PPG: minimally dampened   Rt little finger doppler waveform - PPG:   minimally dampened   Lt thumb doppler waveform - PPG:   Within normal limits   Lt index finger doppler waveform - PPG:    Within normal limits   Lt middle finger doppler waveform - PPG:   Within normal limits   Lt ring finger doppler waveform - PPG: Within normal limits   Lt little finger doppler waveform - PPG:   Within normal limits     VAS US carotids b/l    RIGHT SIDE:   Normal - No evidence of plaque in the right internal carotid artery.   Antegrade flow in the right vertebral artery.   The right Subclavian artery is not visualized, suggestive of possible vessel occlusion. The Axillary and prox Brachial arteries appear   patent without a significant stenosis.     LEFT SIDE:   Normal - No evidence of plaque in the left internal carotid artery.   Antegrade flow in the left vertebral artery.   The left Subclavian and Axillary arteries appear patent without a significant stenosis.       VAS US CARLOS    Right Leg: Segmental pressures may be unreliable due to suspected medial calcinosis; however, PVR waveforms suggest no   evidence of significant peripheral arterial occlusive disease.   - Rt Great Toe: The PPG waveform as described above. - TBI of 1.2 with a toe pressure of 157 mmHg is noted.     Left Leg: Segmental pressures may be unreliable due to suspected medial calcinosis; however, PVR waveforms suggest minimal   femoral peripheral arterial occlusive disease.   - Lt Great Toe: The PPG waveform as described above. - TBI of 1.24 with a toe pressure of 162 mmHg is noted.     -Brachial pressures differ by greater than 20 mmHg.       12/2021    She has been calling me with the following complaints :     Left facial swelling  Left arm swelling  Left hand and wrist swelling  Left ankle swelling  She sleeps on both sides    The swelling is random    Left  facial twitching   Numbness in the hands  Right toe tingles    On taper of prednisone,now on 10 mg-for 3 days now  Prior to that we did 40/30/20  Cramps in the legs    On mtx 7 tabs weekly,folic acid  Plan is to maximise mtx to 9 pills weekly /folic acid     She is very tired    ESR,CRP nml      41 year old white female presented with      Chest pains-mid September-went to the hospital  EKG abnormal-nonspecific ST segment in inferolateral leads  Admitted     Right subclavian artery occluded    Stress test-decreased LAD territory  ECHO : Gradient across aortic valve nml  EF nml  Grade 1 diastolic dysfunction    Heart fine,no blockages  Cholesterol normal  a1c normal  Triglycerides normal    Vascular surgeon -has evaluated her    ESR nml  CRP elevated 4.41    Blood cultures negative    CMP nml  CBC nml  TSH nml    Carotid artery -CTA-Right subclavian artery occluded   Aortic root shoot-showed occlusion of right subclavian artery after right coomon carotid artery origin    There is complete occlusion from the origin of the subclavian artery to the axilla     Symptoms     Fatigue  Night sweats  Feels feverish  Weight gain-40 pounds  Sob  Dizziness  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  Arms ache when she showers  When she gets up she becomes dizzy  Hands are hurting  Chronic headaches-now severe-not responding to ibuprofen    Infact she has 2 year h/o right hand numbness pain and swelling after combing hair and significant work using this hand       Right eye-blurry vision-waves bottom right field  No eye exam recently    Abdominal pain-new -with eating within 10 to 15 minutes-lower abdomen    She had abdominal infection s/p 3rd delivery  She had hysterectomy,saplingo-oophorectomy   3 hernia surgeries   Chronic diarrhea-with eating     Past history : none    Family history : cervical and ovarian cancer  Sister RA  Son JEFFREY  Son severe asthma,food allergies,eczema   Aunt federico dickenss  Sister-spontaneous coronary  dissection s/p delivery     Social history : current smoker,not an alcoholic        Review of Systems   Constitutional:  Positive for fever. Negative for unexpected weight change.   HENT:  Negative for mouth sores and trouble swallowing.    Eyes:  Negative for redness.   Respiratory:  Positive for shortness of breath. Negative for cough.    Cardiovascular:  Positive for chest pain.   Gastrointestinal:  Negative for constipation and diarrhea.   Genitourinary:  Negative for dysuria and genital sores.   Skin:  Negative for rash.   Neurological:  Negative for headaches.   Hematological:  Does not bruise/bleed easily.          No skin rashes,malar rash,photosensitivity   No telangiectasias   No calcinosis   No psoriasis   No patchy alopecia   No oral and nasal ulcers   No dry eyes and dry mouth   No dysphagia,diplopia and dysphonia and muscle weakness   No n/v/c   No acid reflux+   No raynaud's+   No digital ulcers   No cytopenias   No renal issues   No blood clots   No weight loss and loss of appetite   1 miscarriage-molar pregnancy  No recurrent conjunctivitis or uveitis or scleritis or episcleritis   No chronic or bloody diarrhea with no u colitis or crohn's /inflammatory bowel disease   No vaginal or  urethral  d/c/STDs/no ulcers   No unexplained lymphadenopathy,parotitis   No seizures,strokes,psychosis  No sclerodactyly  No puffy hands  No perioral tightness       Physical Exam   Constitutional: She is oriented to person, place, and time. No distress.   HENT:   Head: Normocephalic.   Mouth/Throat: Oropharynx is clear and moist.   Eyes: Pupils are equal, round, and reactive to light. Conjunctivae are normal. Right eye exhibits no discharge. Left eye exhibits no discharge. No scleral icterus.   Neck: No thyromegaly present.   Cardiovascular: Normal rate, regular rhythm and normal heart sounds.   Pulmonary/Chest: Effort normal and breath sounds normal. No stridor.   Abdominal: Soft. Bowel sounds are normal.    Musculoskeletal:         General: Normal range of motion.      Cervical back: Normal range of motion.   Lymphadenopathy:     She has no cervical adenopathy.   Neurological: She is alert and oriented to person, place, and time.   Skin: Skin is warm. No rash noted. She is not diaphoretic.   Psychiatric: Affect and judgment normal.       Laboratory abnormalities    nml CBC,CMP  ESR nml  CRP 9.1  UA no blood or protein  nml troponins  nml BNP  nml hb11c  nml CMP  nml mag and phos  Elevated IL-6  Pre dmard panel neg    Initial imaging studies     CTA chest noncoronary    No pulmonary thromboembolism.  There is high-grade stenosis of the right subclavian artery approximately 2.5 cm from its origin in this patient with reported history of the same.  Correlation is advised.  No acute cardiopulmonary process.  Possible hepatic steatosis, correlation with LFTs recommended.      CTA abdomen and pelvis  Partially calcified splenic artery aneurysm at the level of the hilum. Otherwise, the abdominopelvic arteries are patent and normal in caliber.       CTA head and neck  1. No acute intracranial findings.  2. CTA head and neck without large vessel occlusion or high-grade stenosis.  3. Additional findings as above.    ECHO    The left ventricle is normal in size with low normal systolic function. The estimated ejection fraction is 50%.  Normal right ventricular size with normal right ventricular systolic function.  Normal left ventricular diastolic function.  Normal central venous pressure (3 mmHg).          Assessment     Patient is a 41 year old female with  [place]- ancestry-  Comes in with 2 year h/o right arm claudication  Right arm goes numb when she does too much!!  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 110? Discrepancy noted    I admitted her with the intent to get   rpt ESR,CRP  MRA-chest,abdomen,pelvis  Brain and neck  Eye exam  Vascular surgery to kindly look at her asap-assess need for revascularisation    Should we immunosuppress? Steroids +/- TNFs-depends on if there is activity as noted on MRI  Not much data on IL-6 inhibitors    GI -eval -is the postprandial pain from occlusion in an artery ? Or given the multiple surgeries are we dealing with some obstruction etc?    CT abdomen-pelvis in addition to MRA might help    It so happened that she had CTA chest and she has high grade stenosis of the R subclavian artery. Vascular surgery felt that the stenosis was chronic due to collaterals and signed off.   Optho also saw the patient and did not note any abnormalities on exam.   CTA of abdomen/pelvis showed partially calcified splenic artery aneurysm at the level of the hilum.   CTA head/neck had no without large vessel occlusion or high-grade stenosis.    She was started on 40 mg prednisone with PJP prophylaxis on 10/30/2021    We saw her in the clinic-11/15/2021  We suggested steroid tapering the prednisone   We started mtx 5 pills weekly with a slow titration    She then came to me on mtx 9 pills weekly,folic acid  Humira was not approved yet    BP was high  She had abnormal CARLOS-   She didn't feel food  She was hurting and also swollen    We then got humira approved  She switched mtx to 25 mg sc weekly injection     I wrote a note to      SINCE I R/CORIN the recent imaging   I only knew of her  right subclavian artery stenosis   But I see that there is a  VAS-US- CARLOS showing  Right Leg: Segmental pressures may be unreliable due to suspected medial calcinosis; however, PVR waveforms suggest no   evidence of significant peripheral arterial occlusive disease.   - Rt Great Toe: The PPG waveform as described above. - TBI of 1.2 with a toe pressure of 157 mmHg is noted.   Left Leg: Segmental pressures may be unreliable due to suspected medial calcinosis; however, PVR waveforms suggest minimal   femoral peripheral arterial occlusive disease.   - Lt Great Toe: The PPG waveform as described above. - TBI of 1.24 with a toe pressure of 162 mmHg is noted.   -Brachial pressures differ by greater than 20 mmHg.   I dont see another CARLOS on the system to compare this to  There is however a CTA abdomen and pelvis when she was first seen by us and admitted   Based on the data we have is there anyway we can tell if the lower extremity lesions are a progression of the disease? Or this might have been there all along but we didn't know    He wrote back to me :     The lower extremity perfusion is normally - luckily for her; those are normal ABIs and waveforms.     Great to add humira in future for better medical therapy and immunomdulation   No need for surgery now          6/2022    Overall she doesn't feel good when she does mtx+ humira at the same time  Off steroids completely     ALT 54  AST nml  CBC,CRP,ESR nml    She is taking some time off work- she feels much better  She thinks rest and staying away from work is helping    She has undergone extensive work up for hand and arm paresthesias and she has a normal MRI brain,C spine has deg arthritis,she has right carpal tunnel syndrome and skin biopsy for small fibre neuropathy has been postponed to a later date    Today BP- one arm 119/85 and the other arm 152/97  No gain in weight  She has lost 10 pounds and then she gained some back    Vascular- 3/2022   has seen  her- surgery not an option  He thinks every 18 months follow up with PPGs,CAROTID AND SUBCLAVIAN US- will be sufficient     On aspirin 81 mg daily  Trying to quit smoking    9/2022    Paper work review    10/2022    Doing well  9/2023 we will rpt scans    We tried to taper mtx to 0.8 ml- discoloration of the hand with use of the right hand that got better with increasing mtx back to 0.9 ml weekly  Lyrica and cymbalta have helped her a lot with hand paresthesias  Now on lyrica 100 mg bid,cymbalta 60 mg bid    ALT -stays at 59  AST nml  ESR,CRP nml  Nml CBC         1. Arteritis, Takayasu    2. Nausea                  Crystal was seen today for disease management.    Diagnoses and all orders for this visit:    Arteritis, Takayasu  -     CBC Auto Differential; Future  -     Comprehensive Metabolic Panel; Future  -     C-Reactive Protein; Future  -     Sedimentation rate; Future    Nausea  -     promethazine (PHENERGAN) 25 MG tablet; 25 MG WITH EVERY MTX WHICH IS ONCE A WEEK    Other orders  -     methotrexate 25 mg/mL injection; Inject 0.8 mL into the skin weekly        Plan    Lfts rpt today    humira to continue    mtx - 0.9 ml weekly-try tapering again to 0.8 ml weekly  Folic acid 3 tabs daily   Phenergan    Every 3 months- labs and taper mtx slowly    Sleep study-mild sleep apnea- CPAP-optional    On cymbalta 60 mg bid   Lyrica 100 mg bid     Vaccines  covid vaccine x 2  Booster needed  Flu shot  Pneumonia shots    Labs and rtc in 3 months

## 2022-10-24 NOTE — PROGRESS NOTES
Rapid3 Question Responses and Scores 10/24/2022   MDHAQ Score 1   Psychologic Score 1.1   Pain Score 5   When you awakened in the morning OVER THE LAST WEEK, did you feel stiff? No   Fatigue Score 7.5   Global Health Score 6   RAPID3 Score 4.78     Answers submitted by the patient for this visit:  Rheumatology Questionnaire (Submitted on 10/24/2022)  fever: No  eye redness: No  mouth sores: No  headaches: Yes  shortness of breath: No  chest pain: Yes  trouble swallowing: No  diarrhea: No  constipation: No  unexpected weight change: No  genital sore: No  dysuria: No  During the last 3 days, have you had a skin rash?: No  Bruises or bleeds easily: Yes  cough: Yes

## 2022-10-28 ENCOUNTER — PATIENT MESSAGE (OUTPATIENT)
Dept: ADMINISTRATIVE | Facility: HOSPITAL | Age: 42
End: 2022-10-28
Payer: COMMERCIAL

## 2022-11-01 ENCOUNTER — PROCEDURE VISIT (OUTPATIENT)
Dept: NEUROLOGY | Facility: CLINIC | Age: 42
End: 2022-11-01
Payer: COMMERCIAL

## 2022-11-01 ENCOUNTER — TELEPHONE (OUTPATIENT)
Dept: NEUROLOGY | Facility: CLINIC | Age: 42
End: 2022-11-01
Payer: COMMERCIAL

## 2022-11-01 VITALS
DIASTOLIC BLOOD PRESSURE: 44 MMHG | BODY MASS INDEX: 47.09 KG/M2 | WEIGHT: 293 LBS | HEART RATE: 111 BPM | HEIGHT: 66 IN | SYSTOLIC BLOOD PRESSURE: 141 MMHG

## 2022-11-01 DIAGNOSIS — G62.9 POLYNEUROPATHY: ICD-10-CM

## 2022-11-01 DIAGNOSIS — Z98.890 S/P SKIN BIOPSY: Primary | ICD-10-CM

## 2022-11-01 DIAGNOSIS — G57.30 PERONEAL NEUROPATHY, UNSPECIFIED LATERALITY: ICD-10-CM

## 2022-11-01 PROCEDURE — 11105 PUNCH BX SKIN EA SEP/ADDL: CPT | Mod: S$GLB,,, | Performed by: PHYSICIAN ASSISTANT

## 2022-11-01 PROCEDURE — 11104 PUNCH BX SKIN SINGLE LESION: CPT | Mod: S$GLB,,, | Performed by: PHYSICIAN ASSISTANT

## 2022-11-01 PROCEDURE — 11104 PR PUNCH BIOPSY, SKIN, SINGLE LESION: ICD-10-PCS | Mod: S$GLB,,, | Performed by: PHYSICIAN ASSISTANT

## 2022-11-01 PROCEDURE — 11105 PR PUNCH BIOPSY, SKIN, EA ADDTL LESION: ICD-10-PCS | Mod: S$GLB,,, | Performed by: PHYSICIAN ASSISTANT

## 2022-11-01 RX ORDER — MUPIROCIN 20 MG/G
OINTMENT TOPICAL 2 TIMES DAILY
Qty: 2 G | Refills: 0 | Status: SHIPPED | OUTPATIENT
Start: 2022-11-01 | End: 2023-01-03

## 2022-11-01 NOTE — TELEPHONE ENCOUNTER
MTX dose change to 0.8 mL weekly.  No PA needed.  $11.75 copay. [facility] in network.     Spoke with pt- she is aware of dose decrease to 0.8 mL weekly and knows how to draw up 0.8 mL into the syringe.     She has MTX and Humira on hand and wants refill call in 2 weeks.

## 2022-11-01 NOTE — PROCEDURES
Procedures    Skin Biopsy for Evaluation of Peripheral Nerve Disease    A 3mm punch biopsy used to take a sample from 8cm proximal to the lateral malleolus for the distal site and 10cm distal from the iliac crest for the proximal site.  The sites were marked and then sterilized with alcohol prep pad and 1% lidocaine HCl (10mg/mL) was injected in both proximal and distal locations. The samples were then placed into Zamboni solution which was marked to designate proximal and distal sites.    Risks of the procedure including local bleeding and risk of infection were both explained to the patient and she expressed verbal consent to the procedure.     Patient was given wound care instructions and prescription of mupirocin topical to apply TIDONALD Wild PA-C

## 2022-11-01 NOTE — TELEPHONE ENCOUNTER
Spoke with Nivia at [facility] to arrange specimen pick-up today. Confirmation # [id number]. Specimen delivered to 1st floor [employer] ([facility]IR) drop off by [facility].

## 2022-11-02 ENCOUNTER — CLINICAL SUPPORT (OUTPATIENT)
Dept: CARDIOLOGY | Facility: HOSPITAL | Age: 42
End: 2022-11-02
Attending: INTERNAL MEDICINE
Payer: COMMERCIAL

## 2022-11-02 VITALS — BODY MASS INDEX: 47.09 KG/M2 | HEIGHT: 66 IN | WEIGHT: 293 LBS | HEART RATE: 76 BPM

## 2022-11-02 DIAGNOSIS — M31.4 ARTERITIS, TAKAYASU: ICD-10-CM

## 2022-11-02 DIAGNOSIS — I10 BENIGN ESSENTIAL HTN: ICD-10-CM

## 2022-11-02 DIAGNOSIS — I73.9 PVD (PERIPHERAL VASCULAR DISEASE): ICD-10-CM

## 2022-11-02 LAB
ASCENDING AORTA: 2.64 CM
AV INDEX (PROSTH): 1.09
AV MEAN GRADIENT: 4 MMHG
AV PEAK GRADIENT: 6 MMHG
AV REGURGITATION PRESSURE HALF TIME: 592.3 MS
AV VALVE AREA: 3.29 CM2
AV VELOCITY RATIO: 0.78
BSA FOR ECHO PROCEDURE: 2.71 M2
CV ECHO LV RWT: 0.34 CM
DOP CALC AO PEAK VEL: 1.25 M/S
DOP CALC AO VTI: 26.2 CM
DOP CALC LVOT AREA: 3 CM2
DOP CALC LVOT DIAMETER: 1.96 CM
DOP CALC LVOT PEAK VEL: 0.98 M/S
DOP CALC LVOT STROKE VOLUME: 86.25 CM3
DOP CALCLVOT PEAK VEL VTI: 28.6 CM
E WAVE DECELERATION TIME: 145.94 MSEC
E/A RATIO: 0.76
E/E' RATIO: 11.07 M/S
ECHO LV POSTERIOR WALL: 0.98 CM (ref 0.6–1.1)
EJECTION FRACTION: 35 %
FRACTIONAL SHORTENING: 24 % (ref 28–44)
INTERVENTRICULAR SEPTUM: 1.24 CM (ref 0.6–1.1)
IVRT: 139.87 MSEC
LA MAJOR: 4.76 CM
LA MINOR: 4.76 CM
LA WIDTH: 3.6 CM
LEFT ATRIUM SIZE: 4 CM
LEFT ATRIUM VOLUME INDEX: 23 ML/M2
LEFT ATRIUM VOLUME: 58.26 CM3
LEFT INTERNAL DIMENSION IN SYSTOLE: 4.41 CM (ref 2.1–4)
LEFT VENTRICLE DIASTOLIC VOLUME INDEX: 65.03 ML/M2
LEFT VENTRICLE DIASTOLIC VOLUME: 164.53 ML
LEFT VENTRICLE MASS INDEX: 105 G/M2
LEFT VENTRICLE SYSTOLIC VOLUME INDEX: 34.8 ML/M2
LEFT VENTRICLE SYSTOLIC VOLUME: 88.15 ML
LEFT VENTRICULAR INTERNAL DIMENSION IN DIASTOLE: 5.77 CM (ref 3.5–6)
LEFT VENTRICULAR MASS: 265.16 G
LV LATERAL E/E' RATIO: 10.38 M/S
LV SEPTAL E/E' RATIO: 11.86 M/S
LVOT MG: 2.36 MMHG
LVOT MV: 0.73 CM/S
MV PEAK A VEL: 1.09 M/S
MV PEAK E VEL: 0.83 M/S
MV STENOSIS PRESSURE HALF TIME: 42.32 MS
MV VALVE AREA P 1/2 METHOD: 5.2 CM2
PISA AR MAX VEL: 4.18 M/S
PISA MRMAX VEL: 5.35 M/S
PULM VEIN S/D RATIO: 1.81
PV PEAK D VEL: 0.31 M/S
PV PEAK S VEL: 0.56 M/S
RA MAJOR: 5.13 CM
RA PRESSURE: 8 MMHG
RA WIDTH: 2.8 CM
RIGHT VENTRICULAR END-DIASTOLIC DIMENSION: 2.87 CM
RIGHT VENTRICULAR LENGTH IN DIASTOLE (APICAL 4-CHAMBER VIEW): 4.17 CM
RV MID DIAMA: 2.26 CM
RV TISSUE DOPPLER FREE WALL SYSTOLIC VELOCITY 1 (APICAL 4 CHAMBER VIEW): 0.01 CM/S
SINUS: 3.24 CM
STJ: 2.66 CM
TDI LATERAL: 0.08 M/S
TDI SEPTAL: 0.07 M/S
TDI: 0.08 M/S
TRICUSPID ANNULAR PLANE SYSTOLIC EXCURSION: 1.59 CM

## 2022-11-02 PROCEDURE — 93306 TTE W/DOPPLER COMPLETE: CPT | Mod: PO

## 2022-11-02 PROCEDURE — 93306 ECHO (CUPID ONLY): ICD-10-PCS | Mod: 26,,, | Performed by: INTERNAL MEDICINE

## 2022-11-02 PROCEDURE — 93306 TTE W/DOPPLER COMPLETE: CPT | Mod: 26,,, | Performed by: INTERNAL MEDICINE

## 2022-11-03 NOTE — PROGRESS NOTES
Echocardiogram is abnormal, showing moderately depressed heart function.    Please make an appointment with me or Fernanda Muir to discuss these findings and further testing

## 2022-11-11 ENCOUNTER — PATIENT MESSAGE (OUTPATIENT)
Dept: CARDIOLOGY | Facility: CLINIC | Age: 42
End: 2022-11-11
Payer: COMMERCIAL

## 2022-11-11 ENCOUNTER — TELEPHONE (OUTPATIENT)
Dept: CARDIOLOGY | Facility: CLINIC | Age: 42
End: 2022-11-11
Payer: COMMERCIAL

## 2022-11-11 NOTE — TELEPHONE ENCOUNTER
----- Message from Teofilo Trimble MD sent at 11/3/2022 12:58 PM CDT -----  Echocardiogram is abnormal, showing moderately depressed heart function.    Please make an appointment with me or Fernanda Muir to discuss these findings and further testing

## 2022-11-15 ENCOUNTER — PATIENT MESSAGE (OUTPATIENT)
Dept: PHARMACY | Facility: CLINIC | Age: 42
End: 2022-11-15
Payer: COMMERCIAL

## 2022-11-15 NOTE — TELEPHONE ENCOUNTER
Specialty Pharmacy - Refill Coordination  Specialty Pharmacy - Medication/Referral Authorization  Specialty Pharmacy - Clinical Intervention    Specialty Medication Orders Linked to Encounter      Flowsheet Row Most Recent Value   Medication #1 adalimumab (HUMIRA PEN) PnKt injection (Order#[id number], Rx#[id number])   Medication #2 methotrexate 25 mg/mL injection (Order#[id number], Rx#[id number])            Refill Questions - Documented Responses      Flowsheet Row Most Recent Value   Patient Availability and HIPAA Verification    Does patient want to proceed with activity? Yes   HIPAA/medical authority confirmed? Yes   Relationship to patient of person spoken to? Self   Refill Screening Questions    Changes to allergies? No   Changes to medications? No   New conditions since last clinic visit? No   Unplanned office visit, urgent care, ED, or hospital admission in the last 4 weeks? No   How does patient/caregiver feel medication is working? Good   Financial problems or insurance changes? No   How many doses of your specialty medications were missed in the last 4 weeks? 0   Would patient like to speak to a pharmacist? No   When does the patient need to receive the medication? 11/18/22   Refill Delivery Questions    How will the patient receive the medication? [facility]   When does the patient need to receive the medication? 11/18/22   Shipping Address Home   Address in [facility] confirmed and updated if neccessary? Yes   Expected Copay ($) 16.75   Is the patient able to afford the medication copay? Yes   Payment Method CC on file   Days supply of Refill 28   Supplies needed? No supplies needed   Refill activity completed? Yes   Refill activity plan Refill scheduled   Shipment/Pickup Date: 11/17/22            Current Outpatient Medications   Medication Sig    acetaminophen (TYLENOL) 500 MG tablet Take 2 tablets (1,000 mg total) by mouth every 8 (eight) hours as needed for Pain. (Patient not taking: Reported on  10/24/2022)    adalimumab (HUMIRA PEN) PnKt injection Inject 1 pen (40 mg total) into the skin every 14 (fourteen) days.    aspirin 81 MG Chew Take 81 mg by mouth once daily.    butalbital-acetaminophen-caffeine -40 mg (FIORICET, ESGIC) -40 mg per tablet Take 1 tablet by mouth every 6 (six) hours as needed for Headaches. (Patient not taking: Reported on 10/24/2022)    calcium carbonate (TUMS) 200 mg calcium (500 mg) chewable tablet Take 2 tablets (1,000 mg total) by mouth once daily. (Patient not taking: Reported on 10/24/2022)    cyanocobalamin, vitamin B-12, (VITAMIN B-12 ORAL) Take by mouth.    DULoxetine (CYMBALTA) 60 MG capsule Take 1 capsule (60 mg total) by mouth 2 (two) times daily.    fluticasone propionate (FLONASE) 50 mcg/actuation nasal spray 1 spray (50 mcg total) by Each Nostril route once daily.    folic acid (FOLVITE) 1 MG tablet Take 1 tablet (1 mg total) by mouth 3 (three) times daily.    methotrexate 25 mg/mL injection Inject 0.8 mL into the skin weekly    mupirocin (BACTROBAN) 2 % ointment Apply topically 2 (two) times daily.    pantoprazole (PROTONIX) 40 MG tablet Take 40 mg by mouth once daily.    pregabalin (LYRICA) 100 MG capsule Take 1 capsule (100 mg total) by mouth 2 (two) times daily.    promethazine (PHENERGAN) 25 MG tablet 25 MG WITH EVERY MTX WHICH IS ONCE A WEEK    vitamin D (VITAMIN D3) 1000 units Tab Take 1 tablet (1,000 Units total) by mouth once daily.   Last reviewed on 11/1/2022  1:45 PM by Rosmery Wild PA-C    Review of patient's allergies indicates:  No Known Allergies Last reviewed on  11/1/2022 1:45 PM by Rosmery Wild    Interventions added this encounter   Closed: Documentation: adalimumab (HUMIRA PEN) PnKt injection  Closed: [facility] Patient Intervention - Drug therapy effectiveness: methotrexate 25 mg/mL injection     Tasks added this encounter   No tasks added.   Tasks due within next 3 months   11/28/2022 - Clinical - Follow Up Assesement  (Annual)  11/14/2022 - Refill Call (Auto Added)     Derik Dominguez, PharmD  [facility]  [address]  [facility] [address]  Phone: [phone]  Fax: [phone]

## 2022-11-21 ENCOUNTER — OFFICE VISIT (OUTPATIENT)
Dept: CARDIOLOGY | Facility: CLINIC | Age: 42
End: 2022-11-21
Payer: COMMERCIAL

## 2022-11-21 VITALS
BODY MASS INDEX: 47.09 KG/M2 | DIASTOLIC BLOOD PRESSURE: 84 MMHG | HEART RATE: 103 BPM | WEIGHT: 293 LBS | HEIGHT: 66 IN | SYSTOLIC BLOOD PRESSURE: 153 MMHG

## 2022-11-21 DIAGNOSIS — I77.1 SUBCLAVIAN ARTERIAL STENOSIS: ICD-10-CM

## 2022-11-21 DIAGNOSIS — I73.9 PVD (PERIPHERAL VASCULAR DISEASE): ICD-10-CM

## 2022-11-21 DIAGNOSIS — I77.1 SUBCLAVIAN ARTERIAL STENOSIS: Primary | ICD-10-CM

## 2022-11-21 DIAGNOSIS — M31.4 ARTERITIS, TAKAYASU: ICD-10-CM

## 2022-11-21 DIAGNOSIS — M31.4 ARTERITIS, TAKAYASU: Primary | ICD-10-CM

## 2022-11-21 PROCEDURE — 99999 PR PBB SHADOW E&M-EST. PATIENT-LVL IV: ICD-10-PCS | Mod: PBBFAC,,, | Performed by: INTERNAL MEDICINE

## 2022-11-21 PROCEDURE — 3008F BODY MASS INDEX DOCD: CPT | Mod: CPTII,S$GLB,, | Performed by: INTERNAL MEDICINE

## 2022-11-21 PROCEDURE — 3008F PR BODY MASS INDEX (BMI) DOCUMENTED: ICD-10-PCS | Mod: CPTII,S$GLB,, | Performed by: INTERNAL MEDICINE

## 2022-11-21 PROCEDURE — 3077F SYST BP >= 140 MM HG: CPT | Mod: CPTII,S$GLB,, | Performed by: INTERNAL MEDICINE

## 2022-11-21 PROCEDURE — 3079F DIAST BP 80-89 MM HG: CPT | Mod: CPTII,S$GLB,, | Performed by: INTERNAL MEDICINE

## 2022-11-21 PROCEDURE — 99999 PR PBB SHADOW E&M-EST. PATIENT-LVL IV: CPT | Mod: PBBFAC,,, | Performed by: INTERNAL MEDICINE

## 2022-11-21 PROCEDURE — 1160F RVW MEDS BY RX/DR IN RCRD: CPT | Mod: CPTII,S$GLB,, | Performed by: INTERNAL MEDICINE

## 2022-11-21 PROCEDURE — 99214 OFFICE O/P EST MOD 30 MIN: CPT | Mod: S$GLB,,, | Performed by: INTERNAL MEDICINE

## 2022-11-21 PROCEDURE — 99214 PR OFFICE/OUTPT VISIT, EST, LEVL IV, 30-39 MIN: ICD-10-PCS | Mod: S$GLB,,, | Performed by: INTERNAL MEDICINE

## 2022-11-21 PROCEDURE — 3079F PR MOST RECENT DIASTOLIC BLOOD PRESSURE 80-89 MM HG: ICD-10-PCS | Mod: CPTII,S$GLB,, | Performed by: INTERNAL MEDICINE

## 2022-11-21 PROCEDURE — 1159F MED LIST DOCD IN RCRD: CPT | Mod: CPTII,S$GLB,, | Performed by: INTERNAL MEDICINE

## 2022-11-21 PROCEDURE — 3077F PR MOST RECENT SYSTOLIC BLOOD PRESSURE >= 140 MM HG: ICD-10-PCS | Mod: CPTII,S$GLB,, | Performed by: INTERNAL MEDICINE

## 2022-11-21 PROCEDURE — 1159F PR MEDICATION LIST DOCUMENTED IN MEDICAL RECORD: ICD-10-PCS | Mod: CPTII,S$GLB,, | Performed by: INTERNAL MEDICINE

## 2022-11-21 PROCEDURE — 1160F PR REVIEW ALL MEDS BY PRESCRIBER/CLIN PHARMACIST DOCUMENTED: ICD-10-PCS | Mod: CPTII,S$GLB,, | Performed by: INTERNAL MEDICINE

## 2022-11-21 NOTE — PROGRESS NOTES
Subjective:    Patient ID:  Kelly Dove is a 42 y.o. female who presents for follow-up of result of recent test    HPI  She comes with persistent chest pain on and off, with and without activity.  She has also complained of shortness of breath, tiredness and fatigue.  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  She ended up getting a cardiac catheterization coming from the groin and showed normal coronary arteries.  She is been followed by a rheumatologist for her Takayasu's disease.    Review of Systems   Constitutional: Negative for decreased appetite, malaise/fatigue, weight gain and weight loss.   Cardiovascular:  Negative for chest pain, dyspnea on exertion, leg swelling, palpitations and syncope.   Respiratory:  Negative for cough and shortness of breath.    Gastrointestinal: Negative.    Neurological:  Negative for weakness.   All other systems reviewed and are negative.     Objective:      Physical Exam  Vitals and nursing note reviewed.   Constitutional:       Appearance: Normal appearance. She is well-developed.   HENT:      Head: Normocephalic.   Eyes:      Pupils: Pupils are equal, round, and reactive to light.   Neck:      Thyroid: No thyromegaly.      Vascular: No carotid bruit or JVD.   Cardiovascular:      Rate and Rhythm: Normal rate and regular rhythm.      Chest Wall: PMI is not displaced.      Pulses: Normal pulses and intact distal pulses.      Heart sounds: Normal heart sounds. No murmur heard.    No gallop.   Pulmonary:      Effort: Pulmonary effort is normal.      Breath sounds: Normal breath sounds.   Abdominal:      Palpations: Abdomen is soft. There is no mass.      Tenderness: There is no abdominal tenderness.   Musculoskeletal:         General: Normal range of motion.      Cervical back: Normal range of motion and neck supple.   Skin:     General: Skin is  warm.   Neurological:      Mental Status: She is alert and oriented to person, place, and time.      Sensory: No sensory deficit.      Deep Tendon Reflexes: Reflexes are normal and symmetric.     Recent echocardiogram reveals moderately depressed LV systolic function with an ejection fraction of 35%.      Assessment:       1. Arteritis, Takayasu    2. PVD (peripheral vascular disease)    3. Subclavian arterial stenosis         Plan:     Will refer to heart failure specialist for further evaluation and assessment of her cardiomyopathy  Follow-up in 3 months

## 2022-11-22 ENCOUNTER — TELEPHONE (OUTPATIENT)
Dept: TRANSPLANT | Facility: CLINIC | Age: 42
End: 2022-11-22
Payer: COMMERCIAL

## 2022-11-22 DIAGNOSIS — I73.9 PVD (PERIPHERAL VASCULAR DISEASE): Primary | ICD-10-CM

## 2022-11-29 ENCOUNTER — TELEPHONE (OUTPATIENT)
Dept: NEUROLOGY | Facility: CLINIC | Age: 42
End: 2022-11-29
Payer: COMMERCIAL

## 2022-11-30 ENCOUNTER — LAB VISIT (OUTPATIENT)
Dept: LAB | Facility: HOSPITAL | Age: 42
End: 2022-11-30
Attending: INTERNAL MEDICINE
Payer: COMMERCIAL

## 2022-11-30 ENCOUNTER — OFFICE VISIT (OUTPATIENT)
Dept: TRANSPLANT | Facility: CLINIC | Age: 42
End: 2022-11-30
Payer: COMMERCIAL

## 2022-11-30 VITALS
WEIGHT: 293 LBS | SYSTOLIC BLOOD PRESSURE: 137 MMHG | DIASTOLIC BLOOD PRESSURE: 93 MMHG | BODY MASS INDEX: 47.09 KG/M2 | OXYGEN SATURATION: 97 % | HEIGHT: 66 IN | HEART RATE: 110 BPM

## 2022-11-30 DIAGNOSIS — G47.33 OSA (OBSTRUCTIVE SLEEP APNEA): ICD-10-CM

## 2022-11-30 DIAGNOSIS — I10 BENIGN ESSENTIAL HTN: ICD-10-CM

## 2022-11-30 DIAGNOSIS — E66.01 SEVERE OBESITY (BMI >= 40): ICD-10-CM

## 2022-11-30 DIAGNOSIS — M31.4 TAKAYASU'S ARTERITIS: ICD-10-CM

## 2022-11-30 DIAGNOSIS — I50.42 CHRONIC COMBINED SYSTOLIC AND DIASTOLIC HEART FAILURE: Primary | ICD-10-CM

## 2022-11-30 DIAGNOSIS — I73.9 PVD (PERIPHERAL VASCULAR DISEASE): ICD-10-CM

## 2022-11-30 DIAGNOSIS — I77.1 SUBCLAVIAN ARTERIAL STENOSIS: ICD-10-CM

## 2022-11-30 DIAGNOSIS — F17.210 SMOKING GREATER THAN 20 PACK YEARS: ICD-10-CM

## 2022-11-30 LAB
ALBUMIN SERPL BCP-MCNC: 4.2 G/DL (ref 3.5–5.2)
ALP SERPL-CCNC: 81 U/L (ref 55–135)
ALT SERPL W/O P-5'-P-CCNC: 65 U/L (ref 10–44)
ANION GAP SERPL CALC-SCNC: 9 MMOL/L (ref 8–16)
AST SERPL-CCNC: 35 U/L (ref 10–40)
BILIRUB SERPL-MCNC: 0.6 MG/DL (ref 0.1–1)
BNP SERPL-MCNC: <10 PG/ML (ref 0–99)
BUN SERPL-MCNC: 10 MG/DL (ref 6–20)
CALCIUM SERPL-MCNC: 9.5 MG/DL (ref 8.7–10.5)
CHLORIDE SERPL-SCNC: 104 MMOL/L (ref 95–110)
CO2 SERPL-SCNC: 27 MMOL/L (ref 23–29)
CREAT SERPL-MCNC: 0.7 MG/DL (ref 0.5–1.4)
EST. GFR  (NO RACE VARIABLE): >60 ML/MIN/1.73 M^2
GLUCOSE SERPL-MCNC: 96 MG/DL (ref 70–110)
MAGNESIUM SERPL-MCNC: 1.9 MG/DL (ref 1.6–2.6)
POTASSIUM SERPL-SCNC: 4.6 MMOL/L (ref 3.5–5.1)
PROT SERPL-MCNC: 7.3 G/DL (ref 6–8.4)
SODIUM SERPL-SCNC: 140 MMOL/L (ref 136–145)
TSH SERPL DL<=0.005 MIU/L-ACNC: 2.4 UIU/ML (ref 0.4–4)

## 2022-11-30 PROCEDURE — 99999 PR PBB SHADOW E&M-EST. PATIENT-LVL III: ICD-10-PCS | Mod: PBBFAC,,, | Performed by: INTERNAL MEDICINE

## 2022-11-30 PROCEDURE — 3008F PR BODY MASS INDEX (BMI) DOCUMENTED: ICD-10-PCS | Mod: CPTII,S$GLB,, | Performed by: INTERNAL MEDICINE

## 2022-11-30 PROCEDURE — 3080F DIAST BP >= 90 MM HG: CPT | Mod: CPTII,S$GLB,, | Performed by: INTERNAL MEDICINE

## 2022-11-30 PROCEDURE — 84443 ASSAY THYROID STIM HORMONE: CPT | Performed by: INTERNAL MEDICINE

## 2022-11-30 PROCEDURE — 3008F BODY MASS INDEX DOCD: CPT | Mod: CPTII,S$GLB,, | Performed by: INTERNAL MEDICINE

## 2022-11-30 PROCEDURE — 3075F PR MOST RECENT SYSTOLIC BLOOD PRESS GE 130-139MM HG: ICD-10-PCS | Mod: CPTII,S$GLB,, | Performed by: INTERNAL MEDICINE

## 2022-11-30 PROCEDURE — 36415 COLL VENOUS BLD VENIPUNCTURE: CPT | Performed by: INTERNAL MEDICINE

## 2022-11-30 PROCEDURE — 80053 COMPREHEN METABOLIC PANEL: CPT | Performed by: INTERNAL MEDICINE

## 2022-11-30 PROCEDURE — 83735 ASSAY OF MAGNESIUM: CPT | Performed by: INTERNAL MEDICINE

## 2022-11-30 PROCEDURE — 99205 PR OFFICE/OUTPT VISIT, NEW, LEVL V, 60-74 MIN: ICD-10-PCS | Mod: S$GLB,,, | Performed by: INTERNAL MEDICINE

## 2022-11-30 PROCEDURE — 4010F PR ACE/ARB THEARPY RXD/TAKEN: ICD-10-PCS | Mod: CPTII,S$GLB,, | Performed by: INTERNAL MEDICINE

## 2022-11-30 PROCEDURE — 4010F ACE/ARB THERAPY RXD/TAKEN: CPT | Mod: CPTII,S$GLB,, | Performed by: INTERNAL MEDICINE

## 2022-11-30 PROCEDURE — 3080F PR MOST RECENT DIASTOLIC BLOOD PRESSURE >= 90 MM HG: ICD-10-PCS | Mod: CPTII,S$GLB,, | Performed by: INTERNAL MEDICINE

## 2022-11-30 PROCEDURE — 3075F SYST BP GE 130 - 139MM HG: CPT | Mod: CPTII,S$GLB,, | Performed by: INTERNAL MEDICINE

## 2022-11-30 PROCEDURE — 99999 PR PBB SHADOW E&M-EST. PATIENT-LVL III: CPT | Mod: PBBFAC,,, | Performed by: INTERNAL MEDICINE

## 2022-11-30 PROCEDURE — 83880 ASSAY OF NATRIURETIC PEPTIDE: CPT | Performed by: INTERNAL MEDICINE

## 2022-11-30 PROCEDURE — 99205 OFFICE O/P NEW HI 60 MIN: CPT | Mod: S$GLB,,, | Performed by: INTERNAL MEDICINE

## 2022-11-30 RX ORDER — SACUBITRIL AND VALSARTAN 24; 26 MG/1; MG/1
1 TABLET, FILM COATED ORAL 2 TIMES DAILY
Qty: 90 TABLET | Refills: 3 | Status: SHIPPED | OUTPATIENT
Start: 2022-11-30 | End: 2023-06-13

## 2022-11-30 RX ORDER — METOPROLOL SUCCINATE 25 MG/1
25 TABLET, EXTENDED RELEASE ORAL NIGHTLY
Qty: 30 TABLET | Refills: 11 | Status: SHIPPED | OUTPATIENT
Start: 2022-11-30 | End: 2023-01-03 | Stop reason: SDUPTHER

## 2022-11-30 NOTE — PROGRESS NOTES
Subjective:       HPI:  Ms. Dove is a very pleasant  42 y.o. year old white female with Takayasu's arteritis, severe obesity, HTN who is referred by our colleague Dr. Teofilo Trimble for evaluation and management of newly diagnosed CMP. Patient was diagnosed with Takayasu's arteriris over a year ago (followed by Dr. Rea (Rheumatology) and remains stable on Humira and methotrexate). She underwent a coronary angiogram that showed normal coronary arteries but had a right subclavian artery stenosis. Clinically reports NYHA class II-III symptoms. Occasional PND. Of note her most recent echo showed a dropped in her LV EF (from 55% to 35-40% by my read). She is not on HF GDMT. Her BP today is elevated and does report high BP at home. Labs reviewed today and are WNL.     2D Echo with CFD done on 2022  The left ventricle is mildly enlarged with mild eccentric hypertrophy and moderately decreased systolic function.  The estimated ejection fraction is 35%.  Grade I left ventricular diastolic dysfunction.  Normal right ventricular size with normal right ventricular systolic function.  Mild aortic regurgitation.  Intermediate central venous pressure (8 mmHg).     Past Medical History:   Diagnosis Date    Takayasu's arteritis      Past Surgical History:   Procedure Laterality Date    ABDOMINAL SURGERY      BREAST BIOPSY      HERNIA REPAIR      HYSTERECTOMY      OOPHORECTOMY      TONSILLECTOMY       OB History          3    Para   3    Term   3       0    AB   0    Living             SAB   0    IAB   0    Ectopic   0    Multiple        Live Births                   Review of Systems   Constitutional: Positive for weight gain. Negative for chills, decreased appetite, diaphoresis, fever, malaise/fatigue, night sweats and weight loss.   Eyes: Negative.    Cardiovascular:  Positive for dyspnea on exertion. Negative for chest pain, claudication, cyanosis, irregular heartbeat, leg swelling,  "near-syncope, orthopnea, palpitations, paroxysmal nocturnal dyspnea and syncope.   Respiratory:  Negative for cough, hemoptysis and shortness of breath.    Endocrine: Negative.    Hematologic/Lymphatic: Negative.    Skin:  Negative for color change, dry skin and nail changes.   Musculoskeletal: Negative.    Gastrointestinal: Negative.    Genitourinary: Negative.    Neurological:  Negative for weakness.     Objective:   Blood pressure (!) 137/93, pulse 110, height 5' 6" (1.676 m), weight (!) 160.4 kg (353 lb 9.9 oz), SpO2 97 %.body mass index is 57.08 kg/m².  Physical Exam  Vitals and nursing note reviewed.   Constitutional:       Appearance: She is well-developed.   HENT:      Head: Normocephalic.   Eyes:      Pupils: Pupils are equal, round, and reactive to light.   Neck:      Vascular: No JVD.   Cardiovascular:      Rate and Rhythm: Normal rate and regular rhythm.      Chest Wall: PMI is displaced.      Pulses: Intact distal pulses.      Heart sounds: Normal heart sounds. No murmur heard.    No friction rub. No gallop.   Pulmonary:      Effort: Pulmonary effort is normal.      Breath sounds: Normal breath sounds. No wheezing or rales.   Abdominal:      General: Bowel sounds are normal.      Palpations: Abdomen is soft.   Musculoskeletal:      Cervical back: Neck supple.   Neurological:      Mental Status: She is alert and oriented to person, place, and time.       Labs:    Chemistry        Component Value Date/Time     11/30/2022 0908    K 4.6 11/30/2022 0908     11/30/2022 0908    CO2 27 11/30/2022 0908    BUN 10 11/30/2022 0908    CREATININE 0.7 11/30/2022 0908    GLU 96 11/30/2022 0908        Component Value Date/Time    CALCIUM 9.5 11/30/2022 0908    ALKPHOS 81 11/30/2022 0908    AST 35 11/30/2022 0908    ALT 65 (H) 11/30/2022 0908    BILITOT 0.6 11/30/2022 0908    ESTGFRAFRICA >60.0 06/02/2022 0859    EGFRNONAA >60.0 06/02/2022 0859          Magnesium   Date Value Ref Range Status   11/30/2022 1.9 " 1.6 - 2.6 mg/dL Final     Lab Results   Component Value Date    WBC 4.68 10/24/2022    HGB 14.5 10/24/2022    HCT 44.7 10/24/2022     10/24/2022     No results found for: INR, PROTIME  BNP   Date Value Ref Range Status   10/28/2021 <10 0 - 99 pg/mL Final     Comment:     Values of less than 100 pg/ml are consistent with non-CHF populations.       Assessment:      1. Chronic combined systolic and diastolic heart failure    2. Takayasu's arteritis    3. Subclavian arterial stenosis    4. Severe obesity (BMI >= 40)    5. FUNMILAYO (obstructive sleep apnea)    6. Smoking greater than 20 pack years    7. Benign essential HTN        Plan:   In summary, Ms. Dove is a very pleasant 43 yo female with Takayasu's arteritis (stable on current Tx) who is referred for evaluation and management of newly diagnosed HFrEF. NYHA class II symptoms. Clinically appears euvolemic. In my opinion her CMP is likely related to her uncontrolled HTN (rather than Humira or progression of takayasu's arteritis). I recommend the following;  Starting GDMT. Start Entresto 24/26 mg BID with plan to uptitrate as tolerated. BMP next week.   Start metoprolol succinate 25 mg daily (take at night)  We discussed the role of GDMT in reverse LV remodeling and the importance to get her BP under control.   Recommend 2 gram sodium restriction and 1500cc fluid restriction.  Encourage physical activity with graded exercise program.  Requested patient to weigh themselves daily, and to notify us if their weight increases by more than 3 lbs in 1 day or 5 lbs in 1 week.   FUNMILAYO using CPAP  RTC in 1 month with labs (will add spironolactone+/- SGLT2i at next visit)  Thank you for allowing me to participate in the care of this very pleasant patient. Do not hesitate to contact me should you have any questions.     Kristopher Holguin MD

## 2022-11-30 NOTE — TELEPHONE ENCOUNTER
----- Message from Greta Hernandez sent at 11/29/2022  1:48 PM CST -----  Contact: pt  Inform Diagnoses requesting a callback to get insurance info on pt             Confirmed contact below:  Contact Name:faith   Phone Number: [phone]

## 2022-12-07 ENCOUNTER — SPECIALTY PHARMACY (OUTPATIENT)
Dept: PHARMACY | Facility: CLINIC | Age: 42
End: 2022-12-07
Payer: COMMERCIAL

## 2022-12-07 NOTE — TELEPHONE ENCOUNTER
Specialty Pharmacy - Clinical Reassessment    Specialty Medication Orders Linked to Encounter      Flowsheet Row Most Recent Value   Medication #1 adalimumab (HUMIRA PEN) PnKt injection (Order#[id number], Rx#[id number])   Medication #2 methotrexate 25 mg/mL injection (Order#[id number], Rx#[id number])          Patient Diagnosis   M31.4 - Takayasu's arteritis    Kelly Dove is a 42 y.o. female, who is followed by the specialty pharmacy service for management and education of her Takayasu's arteritis.  She has been on therapy with Humira and MTX for 10 months.  I have reviewed her electronic medical record and current medication list and determined that specialty medication adjustment Is not needed at this time.    Patient has experienced adverse events such as nausea and tiredness, and is managed in the following way(s): Promethazine PRN and sleep hygiene.  She Is adherent reporting 0 missed doses since last review.  Adherence has been encouraged with the following mechanism(s): proactive refill calls  She is meeting goals of therapy and will continue treatment.        11/15/2022 9/16/2022 8/19/2022 7/27/2022 5/30/2022 4/25/2022 3/22/2022   Follow Up Review   # of missed doses 0    0 0 0 0 0 0 0   New Medications? No    No No No Yes       increase in mtx dose and lyrica dose No No No   New Conditions? No    No No No No No No No   New Allergies? No    No No No No No No No   Med Effective? Good    Good Excellent Good Good Good Good Too soon to tell   Urgent Care? No    No No No No No No No   Requested Pharmacist? No    No No No No No No No       Multiple values from one day are sorted in reverse-chronological order         Therapy is appropriate to continue.    Therapy is effective: Yes  On scale of 1 to 10, how does patient rank quality of life? (10 - Best): 5  Recommendations: Continue Promethazine PRN for nausea associated with MTX. Pt is already on injectable MTX which has less GI SE than PO MTX.  Report if  nausea is not well controlled by Promethazine PRN. Per last office visit, plan is to slowly taper MTX.  Patient's nausea should subside/ resolve upon medication taper/discontinuation once appropriate.  Patient reports that she feels tired on weeks that she takes both MTX and Humira.  Encouraged sleep hygiene and get as much rest as possible.  Encouraged patient that she may feel less tired once MTX is eventually tapered off.   Review Method: Patient Contact    Tasks added this encounter   No tasks added.   Tasks due within next 3 months   11/28/2022 - Clinical - Follow Up Assesement (Annual)  12/12/2022 - Refill Call (Auto Added)     Jackie Leon, PharmD  [facility]  [address]  [facility] [address]  Phone: [phone]  Fax: [phone]

## 2022-12-12 ENCOUNTER — LAB VISIT (OUTPATIENT)
Dept: LAB | Facility: HOSPITAL | Age: 42
End: 2022-12-12
Attending: INTERNAL MEDICINE
Payer: COMMERCIAL

## 2022-12-12 ENCOUNTER — PATIENT MESSAGE (OUTPATIENT)
Dept: TRANSPLANT | Facility: CLINIC | Age: 42
End: 2022-12-12
Payer: COMMERCIAL

## 2022-12-12 DIAGNOSIS — I50.42 CHRONIC COMBINED SYSTOLIC AND DIASTOLIC HEART FAILURE: ICD-10-CM

## 2022-12-12 LAB
ANION GAP SERPL CALC-SCNC: 7 MMOL/L (ref 8–16)
BUN SERPL-MCNC: 10 MG/DL (ref 6–20)
CALCIUM SERPL-MCNC: 9.6 MG/DL (ref 8.7–10.5)
CHLORIDE SERPL-SCNC: 103 MMOL/L (ref 95–110)
CO2 SERPL-SCNC: 30 MMOL/L (ref 23–29)
CREAT SERPL-MCNC: 0.6 MG/DL (ref 0.5–1.4)
EST. GFR  (NO RACE VARIABLE): >60 ML/MIN/1.73 M^2
GLUCOSE SERPL-MCNC: 86 MG/DL (ref 70–110)
POTASSIUM SERPL-SCNC: 4.2 MMOL/L (ref 3.5–5.1)
SODIUM SERPL-SCNC: 140 MMOL/L (ref 136–145)

## 2022-12-12 PROCEDURE — 36415 COLL VENOUS BLD VENIPUNCTURE: CPT | Mod: PO | Performed by: INTERNAL MEDICINE

## 2022-12-12 PROCEDURE — 80048 BASIC METABOLIC PNL TOTAL CA: CPT | Performed by: INTERNAL MEDICINE

## 2022-12-15 ENCOUNTER — SPECIALTY PHARMACY (OUTPATIENT)
Dept: PHARMACY | Facility: CLINIC | Age: 42
End: 2022-12-15
Payer: COMMERCIAL

## 2022-12-15 NOTE — TELEPHONE ENCOUNTER
Specialty Pharmacy - Refill Coordination    Specialty Medication Orders Linked to Encounter      Flowsheet Row Most Recent Value   Medication #1 adalimumab (HUMIRA PEN) PnKt injection (Order#[id number], Rx#[id number])   Medication #2 methotrexate 25 mg/mL injection (Order#[id number], Rx#[id number])            Refill Questions - Documented Responses      Flowsheet Row Most Recent Value   Patient Availability and HIPAA Verification    Does patient want to proceed with activity? Yes   HIPAA/medical authority confirmed? Yes   Relationship to patient of person spoken to? Self   Refill Screening Questions    Changes to allergies? No   Changes to medications? No   New conditions since last clinic visit? No   Unplanned office visit, urgent care, ED, or hospital admission in the last 4 weeks? No   How does patient/caregiver feel medication is working? Good   Financial problems or insurance changes? No   How many doses of your specialty medications were missed in the last 4 weeks? 0   Would patient like to speak to a pharmacist? No   When does the patient need to receive the medication? 12/23/22   Refill Delivery Questions    How will the patient receive the medication? MED[facility]   When does the patient need to receive the medication? 12/23/22   Shipping Address Home   Address in [facility] confirmed and updated if neccessary? Yes   Expected Copay ($) 16.75   Is the patient able to afford the medication copay? Yes   Payment Method CC on file   Days supply of Refill 28   Supplies needed? No supplies needed   Refill activity completed? Yes   Refill activity plan Refill scheduled   Shipment/Pickup Date: 12/21/22            Current Outpatient Medications   Medication Sig    acetaminophen (TYLENOL) 500 MG tablet Take 2 tablets (1,000 mg total) by mouth every 8 (eight) hours as needed for Pain.    adalimumab (HUMIRA PEN) PnKt injection Inject 1 pen (40 mg total) into the skin every 14 (fourteen) days.    aspirin 81 MG Chew Take  81 mg by mouth once daily.    calcium carbonate (TUMS) 200 mg calcium (500 mg) chewable tablet Take 2 tablets (1,000 mg total) by mouth once daily.    cyanocobalamin, vitamin B-12, (VITAMIN B-12 ORAL) Take by mouth.    DULoxetine (CYMBALTA) 60 MG capsule Take 1 capsule (60 mg total) by mouth 2 (two) times daily.    fluticasone propionate (FLONASE) 50 mcg/actuation nasal spray 1 spray (50 mcg total) by Each Nostril route once daily.    folic acid (FOLVITE) 1 MG tablet Take 1 tablet (1 mg total) by mouth 3 (three) times daily.    methotrexate 25 mg/mL injection Inject 0.8 mL into the skin weekly    metoprolol succinate (TOPROL-XL) 25 MG 24 hr tablet Take 1 tablet (25 mg total) by mouth every evening.    mupirocin (BACTROBAN) 2 % ointment Apply topically 2 (two) times daily.    pantoprazole (PROTONIX) 40 MG tablet Take 40 mg by mouth once daily.    pregabalin (LYRICA) 100 MG capsule Take 1 capsule (100 mg total) by mouth 2 (two) times daily.    promethazine (PHENERGAN) 25 MG tablet 25 MG WITH EVERY MTX WHICH IS ONCE A WEEK    sacubitriL-valsartan (ENTRESTO) 24-26 mg per tablet Take 1 tablet by mouth 2 (two) times daily.    vitamin D (VITAMIN D3) 1000 units Tab Take 1 tablet (1,000 Units total) by mouth once daily.   Last reviewed on 11/21/2022 12:30 PM by Teofilo Trimble MD    Review of patient's allergies indicates:  No Known Allergies Last reviewed on  11/30/2022 10:11 AM by Kristopher Holguin      Tasks added this encounter   1/13/2023 - Refill Call (Auto Added)   Tasks due within next 3 months   No tasks due.     Laura Rondon  [facility]  [address] [place] [id number]  Phone: [phone]  Fax: [phone]

## 2022-12-21 ENCOUNTER — TELEPHONE (OUTPATIENT)
Dept: FAMILY MEDICINE | Facility: CLINIC | Age: 42
End: 2022-12-21
Payer: COMMERCIAL

## 2023-01-03 ENCOUNTER — OFFICE VISIT (OUTPATIENT)
Dept: TRANSPLANT | Facility: CLINIC | Age: 43
End: 2023-01-03
Payer: COMMERCIAL

## 2023-01-03 ENCOUNTER — LAB VISIT (OUTPATIENT)
Dept: LAB | Facility: HOSPITAL | Age: 43
End: 2023-01-03
Attending: INTERNAL MEDICINE
Payer: COMMERCIAL

## 2023-01-03 VITALS
BODY MASS INDEX: 47.09 KG/M2 | SYSTOLIC BLOOD PRESSURE: 142 MMHG | HEIGHT: 66 IN | DIASTOLIC BLOOD PRESSURE: 82 MMHG | WEIGHT: 293 LBS | HEART RATE: 91 BPM

## 2023-01-03 DIAGNOSIS — M31.4 TAKAYASU'S ARTERITIS: ICD-10-CM

## 2023-01-03 DIAGNOSIS — I50.42 CHRONIC COMBINED SYSTOLIC AND DIASTOLIC HEART FAILURE: Primary | ICD-10-CM

## 2023-01-03 DIAGNOSIS — I77.1 SUBCLAVIAN ARTERIAL STENOSIS: ICD-10-CM

## 2023-01-03 DIAGNOSIS — G47.33 OSA (OBSTRUCTIVE SLEEP APNEA): ICD-10-CM

## 2023-01-03 DIAGNOSIS — I42.8 CARDIOMYOPATHY, NONISCHEMIC: ICD-10-CM

## 2023-01-03 DIAGNOSIS — I50.42 CHRONIC COMBINED SYSTOLIC AND DIASTOLIC HEART FAILURE: ICD-10-CM

## 2023-01-03 DIAGNOSIS — I73.9 PVD (PERIPHERAL VASCULAR DISEASE): ICD-10-CM

## 2023-01-03 DIAGNOSIS — E66.01 SEVERE OBESITY (BMI >= 40): ICD-10-CM

## 2023-01-03 DIAGNOSIS — I10 BENIGN ESSENTIAL HTN: ICD-10-CM

## 2023-01-03 LAB
ANION GAP SERPL CALC-SCNC: 10 MMOL/L (ref 8–16)
BUN SERPL-MCNC: 9 MG/DL (ref 6–20)
CALCIUM SERPL-MCNC: 9.9 MG/DL (ref 8.7–10.5)
CHLORIDE SERPL-SCNC: 107 MMOL/L (ref 95–110)
CO2 SERPL-SCNC: 23 MMOL/L (ref 23–29)
CREAT SERPL-MCNC: 0.6 MG/DL (ref 0.5–1.4)
EST. GFR  (NO RACE VARIABLE): >60 ML/MIN/1.73 M^2
GLUCOSE SERPL-MCNC: 97 MG/DL (ref 70–110)
POTASSIUM SERPL-SCNC: 4.3 MMOL/L (ref 3.5–5.1)
SODIUM SERPL-SCNC: 140 MMOL/L (ref 136–145)

## 2023-01-03 PROCEDURE — 80048 BASIC METABOLIC PNL TOTAL CA: CPT | Performed by: INTERNAL MEDICINE

## 2023-01-03 PROCEDURE — 3077F PR MOST RECENT SYSTOLIC BLOOD PRESSURE >= 140 MM HG: ICD-10-PCS | Mod: CPTII,S$GLB,, | Performed by: INTERNAL MEDICINE

## 2023-01-03 PROCEDURE — 3008F BODY MASS INDEX DOCD: CPT | Mod: CPTII,S$GLB,, | Performed by: INTERNAL MEDICINE

## 2023-01-03 PROCEDURE — 3008F PR BODY MASS INDEX (BMI) DOCUMENTED: ICD-10-PCS | Mod: CPTII,S$GLB,, | Performed by: INTERNAL MEDICINE

## 2023-01-03 PROCEDURE — 99999 PR PBB SHADOW E&M-EST. PATIENT-LVL IV: CPT | Mod: PBBFAC,,, | Performed by: INTERNAL MEDICINE

## 2023-01-03 PROCEDURE — 1159F MED LIST DOCD IN RCRD: CPT | Mod: CPTII,S$GLB,, | Performed by: INTERNAL MEDICINE

## 2023-01-03 PROCEDURE — 36415 COLL VENOUS BLD VENIPUNCTURE: CPT | Performed by: INTERNAL MEDICINE

## 2023-01-03 PROCEDURE — 99999 PR PBB SHADOW E&M-EST. PATIENT-LVL IV: ICD-10-PCS | Mod: PBBFAC,,, | Performed by: INTERNAL MEDICINE

## 2023-01-03 PROCEDURE — 99215 OFFICE O/P EST HI 40 MIN: CPT | Mod: S$GLB,,, | Performed by: INTERNAL MEDICINE

## 2023-01-03 PROCEDURE — 1159F PR MEDICATION LIST DOCUMENTED IN MEDICAL RECORD: ICD-10-PCS | Mod: CPTII,S$GLB,, | Performed by: INTERNAL MEDICINE

## 2023-01-03 PROCEDURE — 3079F DIAST BP 80-89 MM HG: CPT | Mod: CPTII,S$GLB,, | Performed by: INTERNAL MEDICINE

## 2023-01-03 PROCEDURE — 3077F SYST BP >= 140 MM HG: CPT | Mod: CPTII,S$GLB,, | Performed by: INTERNAL MEDICINE

## 2023-01-03 PROCEDURE — 99215 PR OFFICE/OUTPT VISIT, EST, LEVL V, 40-54 MIN: ICD-10-PCS | Mod: S$GLB,,, | Performed by: INTERNAL MEDICINE

## 2023-01-03 PROCEDURE — 3079F PR MOST RECENT DIASTOLIC BLOOD PRESSURE 80-89 MM HG: ICD-10-PCS | Mod: CPTII,S$GLB,, | Performed by: INTERNAL MEDICINE

## 2023-01-03 RX ORDER — SPIRONOLACTONE 25 MG/1
TABLET ORAL
Qty: 90 TABLET | Refills: 3 | Status: SHIPPED | OUTPATIENT
Start: 2023-01-03

## 2023-01-03 RX ORDER — METOPROLOL SUCCINATE 50 MG/1
50 TABLET, EXTENDED RELEASE ORAL NIGHTLY
Qty: 90 TABLET | Refills: 3 | Status: SHIPPED | OUTPATIENT
Start: 2023-01-03 | End: 2023-04-03 | Stop reason: SDUPTHER

## 2023-01-03 RX ORDER — BUTALBITAL, ACETAMINOPHEN AND CAFFEINE 50; 325; 40 MG/1; MG/1; MG/1
1 TABLET ORAL EVERY 6 HOURS PRN
COMMUNITY
Start: 2022-11-26 | End: 2023-04-20 | Stop reason: SDUPTHER

## 2023-01-03 NOTE — PROGRESS NOTES
Subjective:       HPI:  Ms. Dove is a very pleasant  42 y.o. year old white female with Takayasu's arteritis, severe obesity, HTN who was referred by our colleague Dr. Teofilo Trimble for evaluation and management of newly diagnosed CMP. This is her 2nd visit with me. Patient was diagnosed with Takayasu's arteriris over a year ago (followed by Dr. Rea (Rheumatology) and remains stable on Humira and methotrexate). She underwent a coronary angiogram that showed normal coronary arteries but had a right subclavian artery stenosis. Clinically reported NYHA class II-III symptoms. Occasional PND. Of note her most recent echo showed a dropped in her LV EF (from 55% to 35-40% by my read). During her 1st visit with me she was not on HF GDMT so I started low dose Entresto and 24/26 mg BID and metoprolol succinate 25 mg at night. Has doen well since. Tolerating her regimen. Her BP today I clinic is elevated but reports her evening BPs being low.. Labs reviewed today and are WNL.      2D Echo with CFD done on 2022  The left ventricle is mildly enlarged with mild eccentric hypertrophy and moderately decreased systolic function.  The estimated ejection fraction is 35%.  Grade I left ventricular diastolic dysfunction.  Normal right ventricular size with normal right ventricular systolic function.  Mild aortic regurgitation.  Intermediate central venous pressure (8 mmHg).       Past Medical History:   Diagnosis Date    Takayasu's arteritis      Past Surgical History:   Procedure Laterality Date    ABDOMINAL SURGERY      BREAST BIOPSY      HERNIA REPAIR      HYSTERECTOMY      OOPHORECTOMY      TONSILLECTOMY       OB History          3    Para   3    Term   3       0    AB   0    Living             SAB   0    IAB   0    Ectopic   0    Multiple        Live Births                   Review of Systems   Constitutional: Negative. Negative for chills, decreased appetite, diaphoresis, fever,  malaise/fatigue, night sweats, weight gain and weight loss.   Eyes: Negative.    Cardiovascular:  Positive for dyspnea on exertion. Negative for chest pain, claudication, cyanosis, irregular heartbeat, leg swelling, near-syncope, orthopnea, palpitations, paroxysmal nocturnal dyspnea and syncope.   Respiratory:  Negative for cough, hemoptysis and shortness of breath.    Endocrine: Negative.    Hematologic/Lymphatic: Negative.    Skin:  Negative for color change, dry skin and nail changes.   Musculoskeletal: Negative.    Gastrointestinal: Negative.    Genitourinary: Negative.    Neurological:  Negative for weakness.     Objective:   There were no vitals taken for this visit.body mass index is unknown because there is no height or weight on file.  Physical Exam  Vitals and nursing note reviewed.   Constitutional:       Appearance: She is well-developed.   HENT:      Head: Normocephalic.   Eyes:      Pupils: Pupils are equal, round, and reactive to light.   Neck:      Vascular: No JVD.   Cardiovascular:      Rate and Rhythm: Normal rate and regular rhythm.      Chest Wall: PMI is displaced.      Pulses: Intact distal pulses.      Heart sounds: Normal heart sounds. No murmur heard.    No friction rub. No gallop.   Pulmonary:      Effort: Pulmonary effort is normal.      Breath sounds: Normal breath sounds. No wheezing or rales.   Abdominal:      General: Bowel sounds are normal.      Palpations: Abdomen is soft.   Musculoskeletal:      Cervical back: Neck supple.   Neurological:      Mental Status: She is alert and oriented to person, place, and time.       Labs:    Chemistry        Component Value Date/Time     12/12/2022 0754    K 4.2 12/12/2022 0754     12/12/2022 0754    CO2 30 (H) 12/12/2022 0754    BUN 10 12/12/2022 0754    CREATININE 0.6 12/12/2022 0754    GLU 86 12/12/2022 0754        Component Value Date/Time    CALCIUM 9.6 12/12/2022 0754    ALKPHOS 81 11/30/2022 0908    AST 35 11/30/2022 0908     ALT 65 (H) 11/30/2022 0908    BILITOT 0.6 11/30/2022 0908    ESTGFRAFRICA >60.0 06/02/2022 0859    EGFRNONAA >60.0 06/02/2022 0859          Magnesium   Date Value Ref Range Status   11/30/2022 1.9 1.6 - 2.6 mg/dL Final     Lab Results   Component Value Date    WBC 4.68 10/24/2022    HGB 14.5 10/24/2022    HCT 44.7 10/24/2022     10/24/2022     Lab Results   Component Value Date    INR 1.0 09/21/2022     BNP   Date Value Ref Range Status   11/30/2022 <10 0 - 99 pg/mL Final     Comment:     Values of less than 100 pg/ml are consistent with non-CHF populations.   10/28/2021 <10 0 - 99 pg/mL Final     Comment:     Values of less than 100 pg/ml are consistent with non-CHF populations.     Assessment:      1. Chronic combined systolic and diastolic heart failure    2. Cardiomyopathy, nonischemic    3. Takayasu's arteritis    4. Subclavian arterial stenosis    5. Severe obesity (BMI >= 40)    6. PVD (peripheral vascular disease)    7. FUNMILAYO (obstructive sleep apnea)    8. Benign essential HTN        Plan:   In summary, Ms. Dove is a very pleasant 43 yo female with Takayasu's arteritis (stable on current Tx). NYHA class II symptoms. Clinically appears euvolemic. As mentioned during her 1st visit with me, her CMP is likely related to her uncontrolled HTN (rather than Humira or progression of takayasu's arteritis). I recommend the following;  Continue to optimize her GDMT.   Increase metoprolol succinate to 50 mg at night   Start spironolactone 12.5 mg daily x1 week. BMP next week. If labs are stable increase spironolactone to 25 mg daily  Continue current dose of Entresto 24/26 mg BID   Start metoprolol succinate 25 mg daily (take at night)  We discussed the role of GDMT in reverse LV remodeling and the importance to get her BP under control.   Recommend 2 gram sodium restriction and 1500cc fluid restriction.  Encourage physical activity with graded exercise program.  Requested patient to weigh themselves daily, and  to notify us if their weight increases by more than 3 lbs in 1 day or 5 lbs in 1 week.   FUNMILAYO using CPAP  RTC in 3 months with labs (will add SGLT2i at next visit)     Kristopher Holguin MD

## 2023-01-06 ENCOUNTER — PATIENT MESSAGE (OUTPATIENT)
Dept: NEUROLOGY | Facility: CLINIC | Age: 43
End: 2023-01-06
Payer: COMMERCIAL

## 2023-01-09 ENCOUNTER — PATIENT MESSAGE (OUTPATIENT)
Dept: TRANSPLANT | Facility: CLINIC | Age: 43
End: 2023-01-09
Payer: COMMERCIAL

## 2023-01-09 ENCOUNTER — TELEPHONE (OUTPATIENT)
Dept: FAMILY MEDICINE | Facility: CLINIC | Age: 43
End: 2023-01-09
Payer: COMMERCIAL

## 2023-01-10 ENCOUNTER — HOSPITAL ENCOUNTER (OUTPATIENT)
Dept: RADIOLOGY | Facility: HOSPITAL | Age: 43
Discharge: HOME | End: 2023-01-10
Attending: FAMILY MEDICINE
Payer: COMMERCIAL

## 2023-01-10 VITALS — HEIGHT: 66 IN | WEIGHT: 293 LBS | BODY MASS INDEX: 47.09 KG/M2

## 2023-01-10 DIAGNOSIS — Z12.31 SCREENING MAMMOGRAM FOR BREAST CANCER: ICD-10-CM

## 2023-01-10 PROCEDURE — 77067 MAMMO DIGITAL SCREENING BILAT WITH TOMO: ICD-10-PCS | Mod: 26,,, | Performed by: RADIOLOGY

## 2023-01-10 PROCEDURE — 77067 SCR MAMMO BI INCL CAD: CPT | Mod: TC,PO

## 2023-01-10 PROCEDURE — 77063 BREAST TOMOSYNTHESIS BI: CPT | Mod: 26,,, | Performed by: RADIOLOGY

## 2023-01-10 PROCEDURE — 77067 SCR MAMMO BI INCL CAD: CPT | Mod: 26,,, | Performed by: RADIOLOGY

## 2023-01-10 PROCEDURE — 77063 MAMMO DIGITAL SCREENING BILAT WITH TOMO: ICD-10-PCS | Mod: 26,,, | Performed by: RADIOLOGY

## 2023-01-11 NOTE — TELEPHONE ENCOUNTER
6:20 pm:  See [employer] messaging w/ pt 1/9-1/10/23  Regarding her report of intense abdominal pain since starting spironolactone 1/4/23    Pt will stop taking it for now -today being day # 1 off of it    Also pt still had bmp drawn today as part of Dr. Gusman 1/3/23 clinic note/plan ( bmp 1 week on 12. 5 mg and increase to 25 mg once daily if labs ok)  Labs resulted late today , in epic and are stable  I called and spoke w/ pt this afternoon and informed her of this    Asked pt to call me Thursday 1/12/23 and let us know how her abdominal pain is    I will then further update Dr. Holguin and if though to be a result of this medication, add it to allergies/adverse reaction.      1/23/22:   Her is Dr. Gusman response 1/11/23  1003 am:   Although spironolactone can cause some diarrhea (rare) it would  not explain her abdominal pain. Her labs look good including her potassium. My recommendation is to resume the spironolactone once her symptoms have resolved and if these recur after resumption of her spironolactone then we can permanently discontinue it.       And my response just now to pt:   Good morning Ms. Landinrd,     Ezio Thompson did review everything regarding your report of symptoms     He said although spironolactone can cause some diarrhea (rare) it would not explain your abdominal pain .  He said your blood work taking the 12.5 mg once daily dosing looked good  He recommends try to resume the Spironolactone @ 12.5 mg once daily once your symptoms have resolved and if they recur after resumption of this med and dose he would then permanently discontinue it    What do you think?  Are you willing to retry it , maybe waiting a few more days then restarting?    Let us know    Sincerely  Anabel

## 2023-01-13 ENCOUNTER — SPECIALTY PHARMACY (OUTPATIENT)
Dept: PHARMACY | Facility: CLINIC | Age: 43
End: 2023-01-13
Payer: COMMERCIAL

## 2023-01-13 NOTE — TELEPHONE ENCOUNTER
Outgoing call: patient is due to inject on 1/20 for both medications. I informed her that a refill request was sent for the Humira and once approved [facility] will follow up. Patient stated she would like for both meds to be sent out together. [facility] will follow up.

## 2023-01-19 RX ORDER — ADALIMUMAB 40MG/0.8ML
40 KIT SUBCUTANEOUS
Qty: 2 PEN | Refills: 11 | Status: SHIPPED | OUTPATIENT
Start: 2023-01-19 | End: 2023-02-18

## 2023-01-23 ENCOUNTER — PATIENT MESSAGE (OUTPATIENT)
Dept: NEUROLOGY | Facility: CLINIC | Age: 43
End: 2023-01-23
Payer: COMMERCIAL

## 2023-01-24 DIAGNOSIS — G62.9 SMALL FIBER NEUROPATHY: ICD-10-CM

## 2023-01-24 RX ORDER — PREGABALIN 150 MG/1
150 CAPSULE ORAL 2 TIMES DAILY
Qty: 180 CAPSULE | Refills: 1 | Status: SHIPPED | OUTPATIENT
Start: 2023-01-24 | End: 2023-04-19 | Stop reason: SDUPTHER

## 2023-01-26 NOTE — TELEPHONE ENCOUNTER
Specialty Pharmacy - Refill Coordination    Specialty Medication Orders Linked to Encounter      Flowsheet Row Most Recent Value   Medication #1 adalimumab (HUMIRA PEN) PnKt injection (Order#[id number], Rx#[id number])   Medication #2 methotrexate 25 mg/mL injection (Order#[id number], Rx#[id number])            Refill Questions - Documented Responses      Flowsheet Row Most Recent Value   Patient Availability and HIPAA Verification    Does patient want to proceed with activity? Yes   HIPAA/medical authority confirmed? Yes   Relationship to patient of person spoken to? Self   Refill Screening Questions    Changes to allergies? No   Changes to medications? No   New conditions since last clinic visit? No   Unplanned office visit, urgent care, ED, or hospital admission in the last 4 weeks? No   How does patient/caregiver feel medication is working? Good   Financial problems or insurance changes? No   How many doses of your specialty medications were missed in the last 4 weeks? 0   Would patient like to speak to a pharmacist? No   When does the patient need to receive the medication? 02/01/23   Refill Delivery Questions    How will the patient receive the medication? MEDRx   When does the patient need to receive the medication? 02/01/23   Shipping Address Home   Address in [facility] confirmed and updated if neccessary? Yes   Expected Copay ($) 14.36   Is the patient able to afford the medication copay? Yes   Payment Method CC on file   Days supply of Refill 28   Supplies needed? No supplies needed   Refill activity completed? Yes   Refill activity plan Refill scheduled   Shipment/Pickup Date: 01/27/23            Current Outpatient Medications   Medication Sig    acetaminophen (TYLENOL) 500 MG tablet Take 2 tablets (1,000 mg total) by mouth every 8 (eight) hours as needed for Pain.    adalimumab (HUMIRA PEN) PnKt injection Inject 1 pen (40 mg total) into the skin every 14 (fourteen) days.    aspirin 81 MG Chew Take  81 mg by mouth once daily.    butalbital-acetaminophen-caffeine -40 mg (FIORICET, ESGIC) -40 mg per tablet Take 1 tablet by mouth every 6 (six) hours as needed.    cyanocobalamin, vitamin B-12, (VITAMIN B-12 ORAL) Take by mouth.    DULoxetine (CYMBALTA) 60 MG capsule Take 1 capsule (60 mg total) by mouth 2 (two) times daily.    fluticasone propionate (FLONASE) 50 mcg/actuation nasal spray 1 spray (50 mcg total) by Each Nostril route once daily.    folic acid (FOLVITE) 1 MG tablet Take 1 tablet (1 mg total) by mouth 3 (three) times daily.    methotrexate 25 mg/mL injection Inject 0.8 mL into the skin weekly    metoprolol succinate (TOPROL-XL) 50 MG 24 hr tablet Take 1 tablet (50 mg total) by mouth every evening.    pantoprazole (PROTONIX) 40 MG tablet Take 40 mg by mouth once daily.    pregabalin (LYRICA) 150 MG capsule Take 1 capsule (150 mg total) by mouth 2 (two) times daily.    promethazine (PHENERGAN) 25 MG tablet 25 MG WITH EVERY MTX WHICH IS ONCE A WEEK    sacubitriL-valsartan (ENTRESTO) 24-26 mg per tablet Take 1 tablet by mouth 2 (two) times daily.    spironolactone (ALDACTONE) 25 MG tablet Take 12.5 mg (half a tab) daily for 1 week. Labs next week. If K and creat are stable increase to 25 g daily.    vitamin D (VITAMIN D3) 1000 units Tab Take 1 tablet (1,000 Units total) by mouth once daily.   Last reviewed on 1/3/2023 11:01 AM by June Rivera MA    Review of patient's allergies indicates:  No Known Allergies Last reviewed on  1/24/2023 4:10 PM by Braden Powers      Tasks added this encounter   2/22/2023 - Refill Call (Auto Added)   Tasks due within next 3 months   No tasks due.     [facility]ien [facility]ugan [facility]  [address] [place] [id number]  Phone: [phone]  Fax: [phone]      Specialty Pharmacy - Refill Coordination    Specialty Medication Orders Linked to Encounter      Flowsheet Row Most Recent Value   Medication #1 adalimumab  (HUMIRA PEN) PnKt injection (Order#[id number], Rx#[id number])   Medication #2 methotrexate 25 mg/mL injection (Order#[id number], Rx#[id number])            Refill Questions - Documented Responses      Flowsheet Row Most Recent Value   Patient Availability and HIPAA Verification    Does patient want to proceed with activity? Yes   HIPAA/medical authority confirmed? Yes   Relationship to patient of person spoken to? Self   Refill Screening Questions    Changes to allergies? No   Changes to medications? No   New conditions since last clinic visit? No   Unplanned office visit, urgent care, ED, or hospital admission in the last 4 weeks? No   How does patient/caregiver feel medication is working? Good   Financial problems or insurance changes? No   How many doses of your specialty medications were missed in the last 4 weeks? 0   Would patient like to speak to a pharmacist? No   When does the patient need to receive the medication? 02/01/23   Refill Delivery Questions    How will the patient receive the medication? MEDRx   When does the patient need to receive the medication? 02/01/23   Shipping Address Home   Address in [facility] confirmed and updated if neccessary? Yes   Expected Copay ($) 14.36   Is the patient able to afford the medication copay? Yes   Payment Method CC on file   Days supply of Refill 28   Supplies needed? No supplies needed   Refill activity completed? Yes   Refill activity plan Refill scheduled   Shipment/Pickup Date: 01/27/23            Current Outpatient Medications   Medication Sig    acetaminophen (TYLENOL) 500 MG tablet Take 2 tablets (1,000 mg total) by mouth every 8 (eight) hours as needed for Pain.    adalimumab (HUMIRA PEN) PnKt injection Inject 1 pen (40 mg total) into the skin every 14 (fourteen) days.    aspirin 81 MG Chew Take 81 mg by mouth once daily.    butalbital-acetaminophen-caffeine -40 mg (FIORICET, ESGIC) -40 mg per tablet Take 1 tablet by mouth every 6 (six)  hours as needed.    cyanocobalamin, vitamin B-12, (VITAMIN B-12 ORAL) Take by mouth.    DULoxetine (CYMBALTA) 60 MG capsule Take 1 capsule (60 mg total) by mouth 2 (two) times daily.    fluticasone propionate (FLONASE) 50 mcg/actuation nasal spray 1 spray (50 mcg total) by Each Nostril route once daily.    folic acid (FOLVITE) 1 MG tablet Take 1 tablet (1 mg total) by mouth 3 (three) times daily.    methotrexate 25 mg/mL injection Inject 0.8 mL into the skin weekly    metoprolol succinate (TOPROL-XL) 50 MG 24 hr tablet Take 1 tablet (50 mg total) by mouth every evening.    pantoprazole (PROTONIX) 40 MG tablet Take 40 mg by mouth once daily.    pregabalin (LYRICA) 150 MG capsule Take 1 capsule (150 mg total) by mouth 2 (two) times daily.    promethazine (PHENERGAN) 25 MG tablet 25 MG WITH EVERY MTX WHICH IS ONCE A WEEK    sacubitriL-valsartan (ENTRESTO) 24-26 mg per tablet Take 1 tablet by mouth 2 (two) times daily.    spironolactone (ALDACTONE) 25 MG tablet Take 12.5 mg (half a tab) daily for 1 week. Labs next week. If K and creat are stable increase to 25 g daily.    vitamin D (VITAMIN D3) 1000 units Tab Take 1 tablet (1,000 Units total) by mouth once daily.   Last reviewed on 1/3/2023 11:01 AM by June Rivera MA    Review of patient's allergies indicates:  No Known Allergies Last reviewed on  1/24/2023 4:10 PM by Braden Powers      Tasks added this encounter   2/22/2023 - Refill Call (Auto Added)   Tasks due within next 3 months   No tasks due.     Lien Dugan [facility]  [address] [place] [address]  Phone: [phone]  Fax: [phone]

## 2023-02-01 ENCOUNTER — E-VISIT (OUTPATIENT)
Dept: FAMILY MEDICINE | Facility: CLINIC | Age: 43
End: 2023-02-01
Payer: COMMERCIAL

## 2023-02-01 ENCOUNTER — PATIENT MESSAGE (OUTPATIENT)
Dept: FAMILY MEDICINE | Facility: CLINIC | Age: 43
End: 2023-02-01

## 2023-02-01 DIAGNOSIS — J06.9 UPPER RESPIRATORY TRACT INFECTION, UNSPECIFIED TYPE: Primary | ICD-10-CM

## 2023-02-01 PROCEDURE — 99421 PR E&M, ONLINE DIGIT, EST, < 7 DAYS, 5-10 MINS: ICD-10-PCS | Mod: S$GLB,,, | Performed by: FAMILY MEDICINE

## 2023-02-01 PROCEDURE — 99421 OL DIG E/M SVC 5-10 MIN: CPT | Mod: S$GLB,,, | Performed by: FAMILY MEDICINE

## 2023-02-01 RX ORDER — METHYLPREDNISOLONE 4 MG/1
TABLET ORAL
Qty: 21 EACH | Refills: 0 | Status: SHIPPED | OUTPATIENT
Start: 2023-02-01 | End: 2023-02-22

## 2023-02-01 NOTE — PROGRESS NOTES
Subjective:       Patient ID: Kelly Dove is a 43 y.o. female.    Chief Complaint: URI    Patient ID: Kelly Dove is a 43 y.o. female.    Chief Complaint: URI    The patient initiated a request through [employer] on 2/1/2023 for evaluation and management with a chief complaint of URI     I evaluated the questionnaire submission on 2/1/2023.  C/O URI symptoms x 5 days.  COVID negative    Unable to display information about patient's questionnaires because the current context does not support Rich Text Format. Please contact a  if you would like this information to display in the current context.     Active Problem List with Overview Notes    Small fiber neuropathy         Date Noted: 01/24/2023      FUNMILAYO (obstructive sleep apnea)         Date Noted: 10/17/2022      Peroneal neuropathy         Date Noted: 10/17/2022      Benign essential HTN         Date Noted: 10/17/2022      Drug-induced immunodeficiency         Date Noted: 09/28/2022      Other insomnia not due to a substance or known physiological condition      Subclavian arterial stenosis      PVD (peripheral vascular disease)      Blurred vision, right eye         Date Noted: 10/29/2021      Takayasu's arteritis         Date Noted: 10/28/2021      Elevated C-reactive protein (CRP)         Date Noted: 10/28/2021      Severe obesity (BMI >= 40)         Date Noted: 10/28/2021      Smoking greater than 20 pack years         Date Noted: 10/28/2021      Chest pain         Date Noted: 10/28/2021      Abdominal pain         Date Noted: 10/28/2021       Recent Labs Obtained:  No visits with results within 7 Day(s) from this visit.  Latest known visit with results is:  Lab Visit on 01/10/2023  Sodium                                        Date: 01/10/2023  Value: 140         Ref range: 136 - 145 mmol/L   Status: Final  Potassium                                     Date: 01/10/2023  Value: 4.2         Ref range: 3.5 - 5.1 mmol/L   Status:  Final  Chloride                                      Date: 01/10/2023  Value: 103         Ref range: 95 - 110 mmol/L    Status: Final  CO2                                           Date: 01/10/2023  Value: 27          Ref range: 23 - 29 mmol/L     Status: Final  Glucose                                       Date: 01/10/2023  Value: 101         Ref range: 70 - 110 mg/dL     Status: Final  BUN                                           Date: 01/10/2023  Value: 7           Ref range: 6 - 20 mg/dL       Status: Final  Creatinine                                    Date: 01/10/2023  Value: 0.6         Ref range: 0.5 - 1.4 mg/dL    Status: Final  Calcium                                       Date: 01/10/2023  Value: 10.0        Ref range: 8.7 - 10.5 mg/dL   Status: Final  Anion Gap                                     Date: 01/10/2023  Value: 10          Ref range: 8 - 16 mmol/L      Status: Final  eGFR                                          Date: 01/10/2023  Value: >60.0       Ref range: >60 mL/min/1.73 *  Status: Final  ------------    Upper respiratory tract infection, unspecified type  (primary encounter diagnosis)     No orders of the defined types were placed in this encounter.           E-Visit Time Tracking:    Day 1 Time (in minutes): 7     Total Time (in minutes): 7            Review of Systems    Objective:      There were no vitals filed for this visit.   Physical Exam    Results for orders placed or performed in visit on 01/10/23   Basic Metabolic Panel   Result Value Ref Range    Sodium 140 136 - 145 mmol/L    Potassium 4.2 3.5 - 5.1 mmol/L    Chloride 103 95 - 110 mmol/L    CO2 27 23 - 29 mmol/L    Glucose 101 70 - 110 mg/dL    BUN 7 6 - 20 mg/dL    Creatinine 0.6 0.5 - 1.4 mg/dL    Calcium 10.0 8.7 - 10.5 mg/dL    Anion Gap 10 8 - 16 mmol/L    eGFR >60.0 >60 mL/min/1.73 m^2      Assessment:       1. Upper respiratory tract infection, unspecified type          Plan:       Upper respiratory tract infection,  unspecified type  -     methylPREDNISolone (MEDROL DOSEPACK) 4 mg tablet; use as directed  Dispense: 21 each; Refill: 0    Will treat with oral steroids at this time.      Medication List with Changes/Refills   New Medications    METHYLPREDNISOLONE (MEDROL DOSEPACK) 4 MG TABLET    use as directed   Current Medications    ACETAMINOPHEN (TYLENOL) 500 MG TABLET    Take 2 tablets (1,000 mg total) by mouth every 8 (eight) hours as needed for Pain.    ADALIMUMAB (HUMIRA PEN) PNKT INJECTION    Inject 1 pen (40 mg total) into the skin every 14 (fourteen) days.    ASPIRIN 81 MG CHEW    Take 81 mg by mouth once daily.    BUTALBITAL-ACETAMINOPHEN-CAFFEINE -40 MG (FIORICET, ESGIC) -40 MG PER TABLET    Take 1 tablet by mouth every 6 (six) hours as needed.    CYANOCOBALAMIN, VITAMIN B-12, (VITAMIN B-12 ORAL)    Take by mouth.    DULOXETINE (CYMBALTA) 60 MG CAPSULE    Take 1 capsule (60 mg total) by mouth 2 (two) times daily.    FLUTICASONE PROPIONATE (FLONASE) 50 MCG/ACTUATION NASAL SPRAY    1 spray (50 mcg total) by Each Nostril route once daily.    FOLIC ACID (FOLVITE) 1 MG TABLET    Take 1 tablet (1 mg total) by mouth 3 (three) times daily.    METHOTREXATE 25 MG/ML INJECTION    Inject 0.8 mL into the skin weekly    METOPROLOL SUCCINATE (TOPROL-XL) 50 MG 24 HR TABLET    Take 1 tablet (50 mg total) by mouth every evening.    PANTOPRAZOLE (PROTONIX) 40 MG TABLET    Take 40 mg by mouth once daily.    PREGABALIN (LYRICA) 150 MG CAPSULE    Take 1 capsule (150 mg total) by mouth 2 (two) times daily.    PROMETHAZINE (PHENERGAN) 25 MG TABLET    25 MG WITH EVERY MTX WHICH IS ONCE A WEEK    SACUBITRIL-VALSARTAN (ENTRESTO) 24-26 MG PER TABLET    Take 1 tablet by mouth 2 (two) times daily.    SPIRONOLACTONE (ALDACTONE) 25 MG TABLET    Take 12.5 mg (half a tab) daily for 1 week. Labs next week. If K and creat are stable increase to 25 g daily.    VITAMIN D (VITAMIN D3) 1000 UNITS TAB    Take 1 tablet (1,000 Units total) by mouth  once daily.

## 2023-02-22 ENCOUNTER — SPECIALTY PHARMACY (OUTPATIENT)
Dept: PHARMACY | Facility: CLINIC | Age: 43
End: 2023-02-22
Payer: COMMERCIAL

## 2023-02-22 DIAGNOSIS — M31.4 TAKAYASU'S ARTERITIS: Primary | ICD-10-CM

## 2023-02-22 NOTE — TELEPHONE ENCOUNTER
Outgoing call regarding methotrexate and humira refill; per pt, she's due to inject on 3/3; informed her that a PA is required for humira, and once approved [facility] will follow up to schedule delivery; pt would like to be called on both meds once PA is approved; routed to [facility] RELA

## 2023-02-24 NOTE — TELEPHONE ENCOUNTER
Called [facility] dept ([phone]) to complete Humira on the phone with rep Bridgett CIFUENTES case # [id number]  Rep stated to Fax chart notes/labs to # [phone]    Faxed chart notes.  Will follow up.

## 2023-02-24 NOTE — TELEPHONE ENCOUNTER
Humira PA approved- scanned into media  Ref # [id number]  2/24/23-2/24/24    Left referral open for 2023 BI.

## 2023-03-01 NOTE — TELEPHONE ENCOUNTER
Benefit Investigation ([employer])     Drug Name: Humira   Insurance per (Rep Denise)   Deductible: No DED   Max OOP: $4,500 (Rep cant disclose Accumulation)   Estimated copay $5   [facility] in Network    FA not required.

## 2023-03-03 ENCOUNTER — PATIENT MESSAGE (OUTPATIENT)
Dept: PHARMACY | Facility: CLINIC | Age: 43
End: 2023-03-03
Payer: COMMERCIAL

## 2023-03-10 ENCOUNTER — PATIENT MESSAGE (OUTPATIENT)
Dept: ADMINISTRATIVE | Facility: HOSPITAL | Age: 43
End: 2023-03-10
Payer: COMMERCIAL

## 2023-03-13 NOTE — TELEPHONE ENCOUNTER
Specialty Pharmacy - Refill Coordination  Specialty Pharmacy - Medication/Referral Authorization    Specialty Medication Orders Linked to Encounter      Flowsheet Row Most Recent Value   Medication #1 adalimumab (HUMIRA PEN) PnKt injection (Order#[id number], Rx#[id number])   Medication #2 adalimumab (HUMIRA PEN) PnKt injection (Order#[id number], Rx#[id number])          Refill Questions - Documented Responses      Flowsheet Row Most Recent Value   Patient Availability and HIPAA Verification    Does patient want to proceed with activity? Unable to Reach   HIPAA/medical authority confirmed? Yes   Relationship to patient of person spoken to? Self          We have had multiple attempts to the patient and have been unsuccessful to reach the patient. We will stop reaching out to the patient but in the event that the patient needs the med and contacts us, we will communicate and begin dispensing for the patient. At your next visit with the patient, please review the importance of being in contact with our specialty pharmacy as a part of our care team.    Interventions added this encounter   Closed: [facility] Provider Intervention - Drug therapy appropriateness: adalimumab (HUMIRA PEN) PnKt injection  Closed: [facility] Provider Intervention - Drug therapy adherence: adalimumab (HUMIRA PEN) PnKt injection     Ambar Guerra, PharmD  [facility]  [address] [facility] [address]  Phone: [phone]  Fax: [phone]

## 2023-03-16 ENCOUNTER — SPECIALTY PHARMACY (OUTPATIENT)
Dept: PHARMACY | Facility: CLINIC | Age: 43
End: 2023-03-16
Payer: COMMERCIAL

## 2023-03-16 DIAGNOSIS — M31.4 TAKAYASU'S ARTERITIS: Primary | ICD-10-CM

## 2023-03-16 NOTE — TELEPHONE ENCOUNTER
Specialty Pharmacy - Refill Coordination    Specialty Medication Orders Linked to Encounter      Flowsheet Row Most Recent Value   Medication #1 methotrexate 25 mg/mL injection (Order#[id number], Rx#[id number])   Medication #2 adalimumab (HUMIRA PEN) PnKt injection (Order#[id number], Rx#[id number])        Report no doses of Humira on hand and 2 doses MTX on hand. Dose due tomorrow for both, advised she inject MTX as planned and missed dose counseling for Humira provided.     Refill Questions - Documented Responses      Flowsheet Row Most Recent Value   Patient Availability and HIPAA Verification    Does patient want to proceed with activity? Yes   HIPAA/medical authority confirmed? Yes   Relationship to patient of person spoken to? Self   Refill Screening Questions    Changes to allergies? No   Changes to medications? No   New conditions since last clinic visit? No   Unplanned office visit, urgent care, ED, or hospital admission in the last 4 weeks? No   How does patient/caregiver feel medication is working? Good   Financial problems or insurance changes? No   How many doses of your specialty medications were missed in the last 4 weeks? 1   Why were doses missed? Other (comment)  [humira dose due tomorrow 3/17/23 unable to get to her until monday earliest. Pt reprots symptoms stable. Advised she reusme with next friday then every other week. Voiced understanding]   Would patient like to speak to a pharmacist? No   When does the patient need to receive the medication? 03/24/23   Refill Delivery Questions    How will the patient receive the medication? MEDRx   When does the patient need to receive the medication? 03/24/23   Shipping Address Home   Address in [facility] confirmed and updated if neccessary? Yes   Expected Copay ($) 14.36   Is the patient able to afford the medication copay? Yes   Payment Method CC on file   Days supply of Refill 28   Supplies needed? No supplies needed   Refill activity  completed? Yes   Refill activity plan Refill scheduled   Shipment/Pickup Date: 03/17/23            Current Outpatient Medications   Medication Sig    acetaminophen (TYLENOL) 500 MG tablet Take 2 tablets (1,000 mg total) by mouth every 8 (eight) hours as needed for Pain.    adalimumab (HUMIRA PEN) PnKt injection Inject 1 pen (40 mg total) into the skin every 14 (fourteen) days.    aspirin 81 MG Chew Take 81 mg by mouth once daily.    butalbital-acetaminophen-caffeine -40 mg (FIORICET, ESGIC) -40 mg per tablet Take 1 tablet by mouth every 6 (six) hours as needed.    cyanocobalamin, vitamin B-12, (VITAMIN B-12 ORAL) Take by mouth.    DULoxetine (CYMBALTA) 60 MG capsule TAKE ONE CAPSULE BY MOUTH TWICE DAILY    fluticasone propionate (FLONASE) 50 mcg/actuation nasal spray 1 spray (50 mcg total) by Each Nostril route once daily.    folic acid (FOLVITE) 1 MG tablet Take 1 tablet (1 mg total) by mouth 3 (three) times daily.    methotrexate 25 mg/mL injection Inject 0.8 mL into the skin weekly    metoprolol succinate (TOPROL-XL) 50 MG 24 hr tablet Take 1 tablet (50 mg total) by mouth every evening.    pantoprazole (PROTONIX) 40 MG tablet Take 40 mg by mouth once daily.    pregabalin (LYRICA) 150 MG capsule Take 1 capsule (150 mg total) by mouth 2 (two) times daily.    promethazine (PHENERGAN) 25 MG tablet 25 MG WITH EVERY MTX WHICH IS ONCE A WEEK    sacubitriL-valsartan (ENTRESTO) 24-26 mg per tablet Take 1 tablet by mouth 2 (two) times daily.    spironolactone (ALDACTONE) 25 MG tablet Take 12.5 mg (half a tab) daily for 1 week. Labs next week. If K and creat are stable increase to 25 g daily.    vitamin D (VITAMIN D3) 1000 units Tab Take 1 tablet (1,000 Units total) by mouth once daily.   Last reviewed on 2/1/2023 12:44 PM by Horacio Erazo MD    Review of patient's allergies indicates:  No Known Allergies Last reviewed on  2/1/2023 12:44 PM by Horacio Erazo      Tasks added this encounter   4/14/2023 -  Refill Call (Auto Added)   Tasks due within next 3 months   No tasks due.     Mary Farris, PharmD  [facility]  [address] [facility] [address]  Phone: [phone]  Fax: [phone]

## 2023-03-22 ENCOUNTER — LAB VISIT (OUTPATIENT)
Dept: LAB | Facility: HOSPITAL | Age: 43
End: 2023-03-22
Attending: INTERNAL MEDICINE
Payer: COMMERCIAL

## 2023-03-22 ENCOUNTER — OFFICE VISIT (OUTPATIENT)
Dept: RHEUMATOLOGY | Facility: CLINIC | Age: 43
End: 2023-03-22
Payer: COMMERCIAL

## 2023-03-22 VITALS
HEIGHT: 65 IN | SYSTOLIC BLOOD PRESSURE: 104 MMHG | HEART RATE: 85 BPM | DIASTOLIC BLOOD PRESSURE: 73 MMHG | BODY MASS INDEX: 48.82 KG/M2 | WEIGHT: 293 LBS

## 2023-03-22 DIAGNOSIS — M31.4 ARTERITIS, TAKAYASU: Primary | ICD-10-CM

## 2023-03-22 DIAGNOSIS — M31.4 ARTERITIS, TAKAYASU: ICD-10-CM

## 2023-03-22 DIAGNOSIS — R11.0 NAUSEA: ICD-10-CM

## 2023-03-22 LAB
ALBUMIN SERPL BCP-MCNC: 4.4 G/DL (ref 3.5–5.2)
ALP SERPL-CCNC: 71 U/L (ref 55–135)
ALT SERPL W/O P-5'-P-CCNC: 62 U/L (ref 10–44)
ANION GAP SERPL CALC-SCNC: 8 MMOL/L (ref 8–16)
AST SERPL-CCNC: 36 U/L (ref 10–40)
BASOPHILS # BLD AUTO: 0.03 K/UL (ref 0–0.2)
BASOPHILS NFR BLD: 0.5 % (ref 0–1.9)
BILIRUB SERPL-MCNC: 0.8 MG/DL (ref 0.1–1)
BUN SERPL-MCNC: 6 MG/DL (ref 6–20)
CALCIUM SERPL-MCNC: 9.6 MG/DL (ref 8.7–10.5)
CHLORIDE SERPL-SCNC: 103 MMOL/L (ref 95–110)
CO2 SERPL-SCNC: 26 MMOL/L (ref 23–29)
CREAT SERPL-MCNC: 0.6 MG/DL (ref 0.5–1.4)
CRP SERPL-MCNC: 3.6 MG/L (ref 0–8.2)
DIFFERENTIAL METHOD: ABNORMAL
EOSINOPHIL # BLD AUTO: 0.2 K/UL (ref 0–0.5)
EOSINOPHIL NFR BLD: 3.2 % (ref 0–8)
ERYTHROCYTE [DISTWIDTH] IN BLOOD BY AUTOMATED COUNT: 14.6 % (ref 11.5–14.5)
ERYTHROCYTE [SEDIMENTATION RATE] IN BLOOD BY PHOTOMETRIC METHOD: 15 MM/HR (ref 0–36)
EST. GFR  (NO RACE VARIABLE): >60 ML/MIN/1.73 M^2
GLUCOSE SERPL-MCNC: 99 MG/DL (ref 70–110)
HCT VFR BLD AUTO: 47 % (ref 37–48.5)
HGB BLD-MCNC: 16.3 G/DL (ref 12–16)
IMM GRANULOCYTES # BLD AUTO: 0.02 K/UL (ref 0–0.04)
IMM GRANULOCYTES NFR BLD AUTO: 0.3 % (ref 0–0.5)
LYMPHOCYTES # BLD AUTO: 2.3 K/UL (ref 1–4.8)
LYMPHOCYTES NFR BLD: 36 % (ref 18–48)
MCH RBC QN AUTO: 31.9 PG (ref 27–31)
MCHC RBC AUTO-ENTMCNC: 34.7 G/DL (ref 32–36)
MCV RBC AUTO: 92 FL (ref 82–98)
MONOCYTES # BLD AUTO: 0.6 K/UL (ref 0.3–1)
MONOCYTES NFR BLD: 8.8 % (ref 4–15)
NEUTROPHILS # BLD AUTO: 3.2 K/UL (ref 1.8–7.7)
NEUTROPHILS NFR BLD: 51.2 % (ref 38–73)
NRBC BLD-RTO: 0 /100 WBC
PLATELET # BLD AUTO: 223 K/UL (ref 150–450)
PMV BLD AUTO: 11.1 FL (ref 9.2–12.9)
POTASSIUM SERPL-SCNC: 4.1 MMOL/L (ref 3.5–5.1)
PROT SERPL-MCNC: 7.5 G/DL (ref 6–8.4)
RBC # BLD AUTO: 5.11 M/UL (ref 4–5.4)
SODIUM SERPL-SCNC: 137 MMOL/L (ref 136–145)
WBC # BLD AUTO: 6.25 K/UL (ref 3.9–12.7)

## 2023-03-22 PROCEDURE — 85652 RBC SED RATE AUTOMATED: CPT | Performed by: INTERNAL MEDICINE

## 2023-03-22 PROCEDURE — 4010F ACE/ARB THERAPY RXD/TAKEN: CPT | Mod: CPTII,S$GLB,, | Performed by: INTERNAL MEDICINE

## 2023-03-22 PROCEDURE — 1160F PR REVIEW ALL MEDS BY PRESCRIBER/CLIN PHARMACIST DOCUMENTED: ICD-10-PCS | Mod: CPTII,S$GLB,, | Performed by: INTERNAL MEDICINE

## 2023-03-22 PROCEDURE — 85025 COMPLETE CBC W/AUTO DIFF WBC: CPT | Performed by: INTERNAL MEDICINE

## 2023-03-22 PROCEDURE — 3074F PR MOST RECENT SYSTOLIC BLOOD PRESSURE < 130 MM HG: ICD-10-PCS | Mod: CPTII,S$GLB,, | Performed by: INTERNAL MEDICINE

## 2023-03-22 PROCEDURE — 3008F BODY MASS INDEX DOCD: CPT | Mod: CPTII,S$GLB,, | Performed by: INTERNAL MEDICINE

## 2023-03-22 PROCEDURE — 99999 PR PBB SHADOW E&M-EST. PATIENT-LVL IV: ICD-10-PCS | Mod: PBBFAC,,, | Performed by: INTERNAL MEDICINE

## 2023-03-22 PROCEDURE — 36415 COLL VENOUS BLD VENIPUNCTURE: CPT | Performed by: INTERNAL MEDICINE

## 2023-03-22 PROCEDURE — 3074F SYST BP LT 130 MM HG: CPT | Mod: CPTII,S$GLB,, | Performed by: INTERNAL MEDICINE

## 2023-03-22 PROCEDURE — 3008F PR BODY MASS INDEX (BMI) DOCUMENTED: ICD-10-PCS | Mod: CPTII,S$GLB,, | Performed by: INTERNAL MEDICINE

## 2023-03-22 PROCEDURE — 99999 PR PBB SHADOW E&M-EST. PATIENT-LVL IV: CPT | Mod: PBBFAC,,, | Performed by: INTERNAL MEDICINE

## 2023-03-22 PROCEDURE — 4010F PR ACE/ARB THEARPY RXD/TAKEN: ICD-10-PCS | Mod: CPTII,S$GLB,, | Performed by: INTERNAL MEDICINE

## 2023-03-22 PROCEDURE — 86140 C-REACTIVE PROTEIN: CPT | Performed by: INTERNAL MEDICINE

## 2023-03-22 PROCEDURE — 3078F DIAST BP <80 MM HG: CPT | Mod: CPTII,S$GLB,, | Performed by: INTERNAL MEDICINE

## 2023-03-22 PROCEDURE — 1160F RVW MEDS BY RX/DR IN RCRD: CPT | Mod: CPTII,S$GLB,, | Performed by: INTERNAL MEDICINE

## 2023-03-22 PROCEDURE — 80053 COMPREHEN METABOLIC PANEL: CPT | Performed by: INTERNAL MEDICINE

## 2023-03-22 PROCEDURE — 99214 PR OFFICE/OUTPT VISIT, EST, LEVL IV, 30-39 MIN: ICD-10-PCS | Mod: S$GLB,,, | Performed by: INTERNAL MEDICINE

## 2023-03-22 PROCEDURE — 3078F PR MOST RECENT DIASTOLIC BLOOD PRESSURE < 80 MM HG: ICD-10-PCS | Mod: CPTII,S$GLB,, | Performed by: INTERNAL MEDICINE

## 2023-03-22 PROCEDURE — 99214 OFFICE O/P EST MOD 30 MIN: CPT | Mod: S$GLB,,, | Performed by: INTERNAL MEDICINE

## 2023-03-22 PROCEDURE — 1159F MED LIST DOCD IN RCRD: CPT | Mod: CPTII,S$GLB,, | Performed by: INTERNAL MEDICINE

## 2023-03-22 PROCEDURE — 1159F PR MEDICATION LIST DOCUMENTED IN MEDICAL RECORD: ICD-10-PCS | Mod: CPTII,S$GLB,, | Performed by: INTERNAL MEDICINE

## 2023-03-22 RX ORDER — PROMETHAZINE HYDROCHLORIDE 25 MG/1
TABLET ORAL
Qty: 20 TABLET | Refills: 0 | Status: SHIPPED | OUTPATIENT
Start: 2023-03-22

## 2023-03-22 RX ORDER — FOLIC ACID 1 MG/1
1 TABLET ORAL 3 TIMES DAILY
Qty: 90 TABLET | Refills: 4 | Status: SHIPPED | OUTPATIENT
Start: 2023-03-22 | End: 2023-08-11

## 2023-03-22 NOTE — PROGRESS NOTES
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  On entresto and metoprolol  /73      Echo 11/2022  The left ventricle is mildly enlarged with mild eccentric hypertrophy and moderately decreased systolic function.  The estimated ejection fraction is 35%.  Grade I left ventricular diastolic dysfunction.  Normal right ventricular size with normal right ventricular systolic function.  Mild aortic regurgitation.  Intermediate central venous pressure (8 mmHg).    It was 50% in 2021    Cause not known- ? Humira ? takayasu's    Diet-major changes  Omega 3 fish oil added    Whole body aches-on CBD gummies     10/2022    Doing really well  On mtx  On humira      9/2022    Today we will review paper work     6/2022    Overall she doesn't feel good when she does mtx+ humira at the same time  Off steroids completely     ALT 54  AST nml  CBC,CRP,ESR nml    She is taking some time off work- she feels much better  She thinks rest and staying away from work is helping    MRI brain nml  C spine deg changes     EMG/NCS  A mild right carpal tunnel syndrome (median neuropathy at the wrist) based upon the abnormal transcarpal comparison evaluation. The right median antidromic sensory and orthodromic motor evaluations are within normal limits.     Small fibre neuropathy- discussed by neurology- she defers to a later date    Neurology- increased cymbalta to 60 mg bid  lyrica 25 mg bid prn as needed offered    Vit b12 : 345  b1 nml  CHAR,SSA,SSB neg  TSH nml    BP- one arm 119/85 and the other arm 152/97    No gain  in weight  She has lost 10 pounds and then she gained some back    Vascular- 3/2022   has seen her- surgery not an option  He thinks every 18 months follow up with PPGs,CAROTID AND SUBCLAVIAN US- will be sufficient    On aspirin 81 mg daily  Trying to quit smoking      2/2022    Whole body pain  covid feb 1rst- 9 days- sinus stuffiness, fatigue    2/10    CRP 13.3  ESR nml    We missed mtx for 10 days due to covid and the CRP might be high due to the covid and not being on mtx    On mtx 9 pills weekly  Folic acid daily    On prednisone 5 mg until she got covid  Now off it    BP today 161/104    Sleep study today  Neurology appointment pending    Many scans were done in 12/2021      VAS US arterial arms    Rt thumb doppler waveform - PPG:   minimally dampened   Rt index finger doppler waveform - PPG:    minimally dampened   Rt middle finger doppler waveform - PPG:   minimally dampened   Rt ring finger doppler waveform - PPG: minimally dampened   Rt little finger doppler waveform - PPG:   minimally dampened   Lt thumb doppler waveform - PPG:   Within normal limits   Lt index finger doppler waveform - PPG:    Within normal limits   Lt middle finger doppler waveform - PPG:   Within normal limits   Lt ring finger doppler waveform - PPG: Within normal limits   Lt little finger doppler waveform - PPG:   Within normal limits     VAS US carotids b/l    RIGHT SIDE:   Normal - No evidence of plaque in the right internal carotid artery.   Antegrade flow in the right vertebral artery.   The right Subclavian artery is not visualized, suggestive of possible vessel occlusion. The Axillary and prox Brachial arteries appear   patent without a significant stenosis.     LEFT SIDE:   Normal - No evidence of plaque in the left internal carotid artery.   Antegrade flow in the left vertebral artery.   The left Subclavian and Axillary arteries appear patent without a significant stenosis.       VAS US CARLOS    Right Leg: Segmental  pressures may be unreliable due to suspected medial calcinosis; however, PVR waveforms suggest no   evidence of significant peripheral arterial occlusive disease.   - Rt Great Toe: The PPG waveform as described above. - TBI of 1.2 with a toe pressure of 157 mmHg is noted.     Left Leg: Segmental pressures may be unreliable due to suspected medial calcinosis; however, PVR waveforms suggest minimal   femoral peripheral arterial occlusive disease.   - Lt Great Toe: The PPG waveform as described above. - TBI of 1.24 with a toe pressure of 162 mmHg is noted.     -Brachial pressures differ by greater than 20 mmHg.       12/2021    She has been calling me with the following complaints :     Left facial swelling  Left arm swelling  Left hand and wrist swelling  Left ankle swelling  She sleeps on both sides    The swelling is random    Left facial twitching   Numbness in the hands  Right toe tingles    On taper of prednisone,now on 10 mg-for 3 days now  Prior to that we did 40/30/20  Cramps in the legs    On mtx 7 tabs weekly,folic acid  Plan is to maximise mtx to 9 pills weekly /folic acid     She is very tired    ESR,CRP nml      41 year old white female presented with      Chest pains-mid September-went to the hospital  EKG abnormal-nonspecific ST segment in inferolateral leads  Admitted     Right subclavian artery occluded    Stress test-decreased LAD territory  ECHO : Gradient across aortic valve nml  EF nml  Grade 1 diastolic dysfunction    Heart fine,no blockages  Cholesterol normal  a1c normal  Triglycerides normal    Vascular surgeon -has evaluated her    ESR nml  CRP elevated 4.41    Blood cultures negative    CMP nml  CBC nml  TSH nml    Carotid artery -CTA-Right subclavian artery occluded   Aortic root shoot-showed occlusion of right subclavian artery after right coomon carotid artery origin    There is complete occlusion from the origin of the subclavian artery to the axilla     Symptoms     Fatigue  Night  sweats  Feels feverish  Weight gain-40 pounds  Sob  Dizziness  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  Arms ache when she showers  When she gets up she becomes dizzy  Hands are hurting  Chronic headaches-now severe-not responding to ibuprofen    Infact she has 2 year h/o right hand numbness pain and swelling after combing hair and significant work using this hand       Right eye-blurry vision-waves bottom right field  No eye exam recently    Abdominal pain-new -with eating within 10 to 15 minutes-lower abdomen    She had abdominal infection s/p 3rd delivery  She had hysterectomy,saplingo-oophorectomy   3 hernia surgeries   Chronic diarrhea-with eating     Past history : none    Family history : cervical and ovarian cancer  Sister RA  Son JEFFREY  Son severe asthma,food allergies,eczema   Aunt caballero johnsons  Sister-spontaneous coronary dissection s/p delivery     Social history : current smoker,not an alcoholic        Review of Systems   Constitutional:  Positive for fever. Negative for unexpected weight change.   HENT:  Negative for mouth sores and trouble swallowing.    Eyes:  Negative for redness.   Respiratory:  Positive for shortness of breath. Negative for cough.    Cardiovascular:  Positive for chest pain.   Gastrointestinal:  Negative for constipation and diarrhea.   Genitourinary:  Negative for dysuria and genital sores.   Skin:  Negative for rash.   Neurological:  Negative for headaches.   Hematological:  Does not bruise/bleed easily.          No skin rashes,malar rash,photosensitivity   No telangiectasias   No calcinosis   No psoriasis   No patchy alopecia   No oral and nasal ulcers   No dry eyes and dry mouth   No dysphagia,diplopia and dysphonia and muscle weakness   No n/v/c   No acid reflux+   No raynaud's+   No digital ulcers   No cytopenias   No renal issues   No blood clots   No weight loss and loss of appetite   1 miscarriage-molar pregnancy  No recurrent conjunctivitis or uveitis or scleritis or  episcleritis   No chronic or bloody diarrhea with no u colitis or crohn's /inflammatory bowel disease   No vaginal or  urethral  d/c/STDs/no ulcers   No unexplained lymphadenopathy,parotitis   No seizures,strokes,psychosis  No sclerodactyly  No puffy hands  No perioral tightness       Physical Exam   Constitutional: She is oriented to person, place, and time. No distress.   HENT:   Head: Normocephalic.   Mouth/Throat: Oropharynx is clear and moist.   Eyes: Pupils are equal, round, and reactive to light. Conjunctivae are normal. Right eye exhibits no discharge. Left eye exhibits no discharge. No scleral icterus.   Neck: No thyromegaly present.   Cardiovascular: Normal rate, regular rhythm and normal heart sounds.   Pulmonary/Chest: Effort normal and breath sounds normal. No stridor.   Abdominal: Soft. Bowel sounds are normal.   Musculoskeletal:         General: Normal range of motion.      Cervical back: Normal range of motion.   Lymphadenopathy:     She has no cervical adenopathy.   Neurological: She is alert and oriented to person, place, and time.   Skin: Skin is warm. No rash noted. She is not diaphoretic.   Psychiatric: Affect and judgment normal.     No trouble palpating blood flow on the left upper,b/l lower extremities  Color changes in all toes    Laboratory abnormalities    nml CBC,CMP  ESR nml  CRP 9.1  UA no blood or protein  nml troponins  nml BNP  nml hb11c  nml CMP  nml mag and phos  Elevated IL-6  Pre dmard panel neg    Initial imaging studies     CTA chest noncoronary    No pulmonary thromboembolism.  There is high-grade stenosis of the right subclavian artery approximately 2.5 cm from its origin in this patient with reported history of the same.  Correlation is advised.  No acute cardiopulmonary process.  Possible hepatic steatosis, correlation with LFTs recommended.      CTA abdomen and pelvis  Partially calcified splenic artery aneurysm at the level of the hilum. Otherwise, the abdominopelvic  arteries are patent and normal in caliber.       CTA head and neck  1. No acute intracranial findings.  2. CTA head and neck without large vessel occlusion or high-grade stenosis.  3. Additional findings as above.    ECHO    The left ventricle is normal in size with low normal systolic function. The estimated ejection fraction is 50%.  Normal right ventricular size with normal right ventricular systolic function.  Normal left ventricular diastolic function.  Normal central venous pressure (3 mmHg).          Assessment     Patient is a 41 year old female with  [place] ancestry-  Comes in with 2 year h/o right arm claudication  Right arm goes numb when she does too much!!  This has progressed over the past 2 years and she now has very limited use of her right arm/cannot brush her hair/cannot shower using this arm for overhead activities    In addition she is     Short of breath  She cannot walk short distances    Has right eye I/M loss of vision right lower field    Postprandial abdominal pain    Dizziness and orthostasis     Labs show elevated cardiac CRP 4.4  nml ESR  CTA-Complete occlusion of right subclavian artery    Other risk factors r./corin  Definitely long term smoking-can put her at high risk for vascular disease  But she doesn't seem to be a diabetic,hypertensive  HDL,LDL ok,triglycerides 210? 110? Discrepancy noted    I admitted her with the intent to get   rpt ESR,CRP  MRA-chest,abdomen,pelvis  Brain and neck  Eye exam  Vascular surgery to kindly look at her asap-assess need for revascularisation    Should we immunosuppress? Steroids +/- TNFs-depends on if there is activity as noted on MRI  Not much data on IL-6 inhibitors    GI -eval -is the postprandial pain from occlusion in an artery ? Or given the multiple surgeries are we dealing with some obstruction etc?    CT abdomen-pelvis in addition to MRA might help    It so happened that she had CTA chest and she has high  grade stenosis of the R subclavian artery. Vascular surgery felt that the stenosis was chronic due to collaterals and signed off.   Optho also saw the patient and did not note any abnormalities on exam.   CTA of abdomen/pelvis showed partially calcified splenic artery aneurysm at the level of the hilum.   CTA head/neck had no without large vessel occlusion or high-grade stenosis.    She was started on 40 mg prednisone with PJP prophylaxis on 10/30/2021    We saw her in the clinic-11/15/2021  We suggested steroid tapering the prednisone   We started mtx 5 pills weekly with a slow titration    She then came to me on mtx 9 pills weekly,folic acid  Humira was not approved yet    BP was high  She had abnormal CARLOS-   She didn't feel food  She was hurting and also swollen    We then got humira approved  She switched mtx to 25 mg sc weekly injection     I wrote a note to      SINCE I R/LYUBOV the recent imaging   I only knew of her right subclavian artery stenosis   But I see that there is a  VAS-US- CARLOS showing  Right Leg: Segmental pressures may be unreliable due to suspected medial calcinosis; however, PVR waveforms suggest no   evidence of significant peripheral arterial occlusive disease.   - Rt Great Toe: The PPG waveform as described above. - TBI of 1.2 with a toe pressure of 157 mmHg is noted.   Left Leg: Segmental pressures may be unreliable due to suspected medial calcinosis; however, PVR waveforms suggest minimal   femoral peripheral arterial occlusive disease.   - Lt Great Toe: The PPG waveform as described above. - TBI of 1.24 with a toe pressure of 162 mmHg is noted.   -Brachial pressures differ by greater than 20 mmHg.   I dont see another CARLOS on the system to compare this to  There is however a CTA abdomen and pelvis when she was first seen by us and admitted   Based on the data we have is there anyway we can tell if the lower extremity lesions are a progression of the disease? Or this might have been  there all along but we didn't know    He wrote back to me :     The lower extremity perfusion is normally - luckily for her; those are normal ABIs and waveforms.     Great to add humira in future for better medical therapy and immunomdulation   No need for surgery now          6/2022    Overall she doesn't feel good when she does mtx+ humira at the same time  Off steroids completely     ALT 54  AST nml  CBC,CRP,ESR nml    She is taking some time off work- she feels much better  She thinks rest and staying away from work is helping    She has undergone extensive work up for hand and arm paresthesias and she has a normal MRI brain,C spine has deg arthritis,she has right carpal tunnel syndrome and skin biopsy for small fibre neuropathy has been postponed to a later date    Today BP- one arm 119/85 and the other arm 152/97  No gain in weight  She has lost 10 pounds and then she gained some back    Vascular- 3/2022   has seen her- surgery not an option  He thinks every 18 months follow up with PPGs,CAROTID AND SUBCLAVIAN US- will be sufficient     On aspirin 81 mg daily  Trying to quit smoking    9/2022    Paper work review    10/2022    Doing well- NML ESR,CRP  9/2023 we will rpt scans    We tried to taper mtx to 0.8 ml- discoloration of the hand with use of the right hand that got better with increasing mtx back to 0.9 ml weekly  Lyrica and cymbalta have helped her a lot with hand paresthesias  Now on lyrica 100 mg bid,cymbalta 60 mg bid    ALT -stays at 59  AST nml  ESR,CRP nml  Nml CBC       3/22/2023    Last visit we weaned mtx mtx  Now on 0.8 ml weekly    Left hand is weak and turns purple-it doesn't have to be cold  Both feet turn purple in the toes-doesn't have to be cold  Leg pain when she walks    Now on heart failure medications for cardiomyopathy- NYHA class II-III symptoms,with PND.  On entresto and metoprolol  /73      Echo 11/2022  The left ventricle is mildly enlarged with mild eccentric  hypertrophy and moderately decreased systolic function.  The estimated ejection fraction is 35%.  Grade I left ventricular diastolic dysfunction.  Normal right ventricular size with normal right ventricular systolic function.  Mild aortic regurgitation.  Intermediate central venous pressure (8 mmHg).    It was 50% in 2021    Cause not known- ? Humira ? takayasu's    Diet-major changes  Omega 3 fish oil added    Main question today    Does she have active Takayasu's arterities?    1. Arteritis, Takayasu    2. Nausea                    Crystal was seen today for disease management.    Diagnoses and all orders for this visit:    Arteritis, Takayasu  -     CTA Chest Aorta Non Coronary; Future  -     CTA Abdomen and Pelvis; Future  -     CTA Lower Extremity Bilateral; Future  -     CTA Head and Neck (xpd); Future  -     CBC Auto Differential; Future  -     Comprehensive Metabolic Panel; Future  -     Sedimentation rate; Future  -     C-Reactive Protein; Future    Nausea  -     promethazine (PHENERGAN) 25 MG tablet; 25 MG WITH EVERY MTX WHICH IS ONCE A WEEK    Other orders  -     folic acid (FOLVITE) 1 MG tablet; Take 1 tablet (1 mg total) by mouth 3 (three) times daily.            Plan    DO THE EXTENSIVE IMAGING- MAKE SURE THERE IS NO ACTIVE TAKAYASU'S  SINCE SHE HAS TOO MANY SYMPTOMS TODAY  Why the color changes in the extremities?  Why the new onset heart failure?  Why the leg claudication? But this is not consistent     Will find out more why humira was not thought to be the cause of the HF by cardiology     CBC,CMP,ESR,CRP today    humira to continue    mtx -  0.8 ml weekly  Folic acid 3 tabs daily   Phenergan    Every 3 months- labs     Sleep study-mild sleep apnea- CPAP-optional    On cymbalta 60 mg bid   Lyrica 100 mg bid     Vaccines  covid vaccine x 2  Booster needed  Flu shot  Pneumonia shots    Labs and rtc in 3 months

## 2023-03-22 NOTE — PROGRESS NOTES
Rapid3 Question Responses and Scores 3/22/2023   MDHAQ Score 0.6   Psychologic Score 2.2   Pain Score 3.5   When you awakened in the morning OVER THE LAST WEEK, did you feel stiff? No   Fatigue Score 1.5   Global Health Score 2   RAPID3 Score 2.5       Answers submitted by the patient for this visit:  Rheumatology Questionnaire (Submitted on 3/22/2023)  fever: No  eye redness: No  mouth sores: No  headaches: Yes  shortness of breath: No  chest pain: No  trouble swallowing: No  diarrhea: Yes  constipation: No  unexpected weight change: No  genital sore: No  dysuria: No  During the last 3 days, have you had a skin rash?: No  Bruises or bleeds easily: Yes  cough: No

## 2023-03-29 ENCOUNTER — HOSPITAL ENCOUNTER (OUTPATIENT)
Dept: RADIOLOGY | Facility: HOSPITAL | Age: 43
Discharge: HOME | End: 2023-03-29
Attending: INTERNAL MEDICINE
Payer: COMMERCIAL

## 2023-03-29 DIAGNOSIS — M31.4 ARTERITIS, TAKAYASU: ICD-10-CM

## 2023-03-29 PROCEDURE — 70496 CTA HEAD AND NECK (XPD): ICD-10-PCS | Mod: 26,,, | Performed by: RADIOLOGY

## 2023-03-29 PROCEDURE — 70496 CT ANGIOGRAPHY HEAD: CPT | Mod: TC,PO

## 2023-03-29 PROCEDURE — 70496 CT ANGIOGRAPHY HEAD: CPT | Mod: 26,,, | Performed by: RADIOLOGY

## 2023-03-29 PROCEDURE — 25500020 PHARM REV CODE 255: Mod: PO | Performed by: INTERNAL MEDICINE

## 2023-03-29 PROCEDURE — 70498 CT ANGIOGRAPHY NECK: CPT | Mod: 26,,, | Performed by: RADIOLOGY

## 2023-03-29 PROCEDURE — 70498 CTA HEAD AND NECK (XPD): ICD-10-PCS | Mod: 26,,, | Performed by: RADIOLOGY

## 2023-03-29 RX ADMIN — IOHEXOL 100 ML: 350 INJECTION, SOLUTION INTRAVENOUS at 03:03

## 2023-04-02 ENCOUNTER — NURSE TRIAGE (OUTPATIENT)
Dept: ADMINISTRATIVE | Facility: CLINIC | Age: 43
End: 2023-04-02
Payer: COMMERCIAL

## 2023-04-02 NOTE — TELEPHONE ENCOUNTER
Spouse calling on behalf of patient who is present. Reports dizziness/lightheadedness w/ ambulation. Reports +SOB which is new for her. Advised, per protocol. Verbalizes understanding.     Reason for Disposition   Difficulty breathing    Additional Information   Negative: SEVERE difficulty breathing (e.g., struggling for each breath, speaks in single words)   Negative: [1] Difficulty breathing or swallowing AND [2] started suddenly after medicine, an allergic food or bee sting   Negative: Shock suspected (e.g., cold/pale/clammy skin, too weak to stand, low BP, rapid pulse)   Negative: Difficult to awaken or acting confused (e.g., disoriented, slurred speech)   Negative: [1] Weakness (i.e., paralysis, loss of muscle strength) of the face, arm or leg on one side of the body AND [2] sudden onset AND [3] present now   Negative: [1] Numbness (i.e., loss of sensation) of the face, arm or leg on one side of the body AND [2] sudden onset AND [3] present now   Negative: [1] Loss of speech or garbled speech AND [2] sudden onset AND [3] present now   Negative: Overdose (accidental or intentional) of medications   Negative: [1] Fainted > 15 minutes ago AND [2] still feels too weak or dizzy to stand   Negative: Heart beating < 50 beats per minute OR > 140 beats per minute   Negative: Sounds like a life-threatening emergency to the triager    Protocols used: Dizziness - [id number]

## 2023-04-03 ENCOUNTER — OFFICE VISIT (OUTPATIENT)
Dept: TRANSPLANT | Facility: CLINIC | Age: 43
End: 2023-04-03
Payer: COMMERCIAL

## 2023-04-03 ENCOUNTER — TELEPHONE (OUTPATIENT)
Dept: TRANSPLANT | Facility: CLINIC | Age: 43
End: 2023-04-03
Payer: COMMERCIAL

## 2023-04-03 ENCOUNTER — PATIENT MESSAGE (OUTPATIENT)
Dept: TRANSPLANT | Facility: CLINIC | Age: 43
End: 2023-04-03

## 2023-04-03 VITALS
BODY MASS INDEX: 47.09 KG/M2 | DIASTOLIC BLOOD PRESSURE: 81 MMHG | SYSTOLIC BLOOD PRESSURE: 137 MMHG | HEIGHT: 66 IN | WEIGHT: 293 LBS | HEART RATE: 108 BPM

## 2023-04-03 DIAGNOSIS — I73.9 PVD (PERIPHERAL VASCULAR DISEASE): ICD-10-CM

## 2023-04-03 DIAGNOSIS — I10 BENIGN ESSENTIAL HTN: ICD-10-CM

## 2023-04-03 DIAGNOSIS — I77.1 SUBCLAVIAN ARTERIAL STENOSIS: ICD-10-CM

## 2023-04-03 DIAGNOSIS — M31.4 TAKAYASU'S ARTERITIS: ICD-10-CM

## 2023-04-03 DIAGNOSIS — R00.2 PALPITATIONS: ICD-10-CM

## 2023-04-03 DIAGNOSIS — G47.33 OSA (OBSTRUCTIVE SLEEP APNEA): ICD-10-CM

## 2023-04-03 DIAGNOSIS — I50.42 CHRONIC COMBINED SYSTOLIC AND DIASTOLIC HEART FAILURE: Primary | ICD-10-CM

## 2023-04-03 DIAGNOSIS — R07.9 CHEST PAIN: ICD-10-CM

## 2023-04-03 DIAGNOSIS — E66.01 SEVERE OBESITY (BMI >= 40): ICD-10-CM

## 2023-04-03 PROCEDURE — 99999 PR PBB SHADOW E&M-EST. PATIENT-LVL V: ICD-10-PCS | Mod: PBBFAC,,, | Performed by: INTERNAL MEDICINE

## 2023-04-03 PROCEDURE — 3075F PR MOST RECENT SYSTOLIC BLOOD PRESS GE 130-139MM HG: ICD-10-PCS | Mod: CPTII,S$GLB,, | Performed by: INTERNAL MEDICINE

## 2023-04-03 PROCEDURE — 99999 PR PBB SHADOW E&M-EST. PATIENT-LVL V: CPT | Mod: PBBFAC,,, | Performed by: INTERNAL MEDICINE

## 2023-04-03 PROCEDURE — 3079F PR MOST RECENT DIASTOLIC BLOOD PRESSURE 80-89 MM HG: ICD-10-PCS | Mod: CPTII,S$GLB,, | Performed by: INTERNAL MEDICINE

## 2023-04-03 PROCEDURE — 99215 PR OFFICE/OUTPT VISIT, EST, LEVL V, 40-54 MIN: ICD-10-PCS | Mod: S$GLB,,, | Performed by: INTERNAL MEDICINE

## 2023-04-03 PROCEDURE — 1160F PR REVIEW ALL MEDS BY PRESCRIBER/CLIN PHARMACIST DOCUMENTED: ICD-10-PCS | Mod: CPTII,S$GLB,, | Performed by: INTERNAL MEDICINE

## 2023-04-03 PROCEDURE — 99215 OFFICE O/P EST HI 40 MIN: CPT | Mod: S$GLB,,, | Performed by: INTERNAL MEDICINE

## 2023-04-03 PROCEDURE — 3075F SYST BP GE 130 - 139MM HG: CPT | Mod: CPTII,S$GLB,, | Performed by: INTERNAL MEDICINE

## 2023-04-03 PROCEDURE — 1159F PR MEDICATION LIST DOCUMENTED IN MEDICAL RECORD: ICD-10-PCS | Mod: CPTII,S$GLB,, | Performed by: INTERNAL MEDICINE

## 2023-04-03 PROCEDURE — 3008F BODY MASS INDEX DOCD: CPT | Mod: CPTII,S$GLB,, | Performed by: INTERNAL MEDICINE

## 2023-04-03 PROCEDURE — 1159F MED LIST DOCD IN RCRD: CPT | Mod: CPTII,S$GLB,, | Performed by: INTERNAL MEDICINE

## 2023-04-03 PROCEDURE — 4010F PR ACE/ARB THEARPY RXD/TAKEN: ICD-10-PCS | Mod: CPTII,S$GLB,, | Performed by: INTERNAL MEDICINE

## 2023-04-03 PROCEDURE — 1160F RVW MEDS BY RX/DR IN RCRD: CPT | Mod: CPTII,S$GLB,, | Performed by: INTERNAL MEDICINE

## 2023-04-03 PROCEDURE — 3008F PR BODY MASS INDEX (BMI) DOCUMENTED: ICD-10-PCS | Mod: CPTII,S$GLB,, | Performed by: INTERNAL MEDICINE

## 2023-04-03 PROCEDURE — 3079F DIAST BP 80-89 MM HG: CPT | Mod: CPTII,S$GLB,, | Performed by: INTERNAL MEDICINE

## 2023-04-03 PROCEDURE — 4010F ACE/ARB THERAPY RXD/TAKEN: CPT | Mod: CPTII,S$GLB,, | Performed by: INTERNAL MEDICINE

## 2023-04-03 RX ORDER — DAPAGLIFLOZIN 10 MG/1
10 TABLET, FILM COATED ORAL DAILY
Qty: 90 TABLET | Refills: 3 | Status: SHIPPED | OUTPATIENT
Start: 2023-04-03

## 2023-04-03 RX ORDER — METOPROLOL SUCCINATE 50 MG/1
100 TABLET, EXTENDED RELEASE ORAL NIGHTLY
Qty: 90 TABLET | Refills: 3 | Status: SHIPPED | OUTPATIENT
Start: 2023-04-03 | End: 2023-10-30 | Stop reason: SDUPTHER

## 2023-04-03 NOTE — TELEPHONE ENCOUNTER
0700 am:  Received and reviewed pt calls message from yesterday afternoon and reviewed ed note as well as labs  See pt is scheduled, as mention in the ED notes, to see HTS provider today, Dr. Holguin  Also noted pt had a cmp as well as a ntprobnp yesterday-therefore sent pt a mychart message letting her know we can cancel the lab appt scheduled for today and proceed with her appt w/ Dr. Holguin and asked she let me know receive pt this before I cancel the lab appt.

## 2023-04-03 NOTE — PATIENT INSTRUCTIONS
30 day cardiac event monitor  Start Dapagliflozin 10 mg daily (every morning)  Increase metoprolol succinate to 100 mg at night   Recommend 2 gram sodium restriction and 1500cc fluid restriction.  Encourage physical activity with graded exercise program.  Requested patient to weigh themselves daily, and to notify us if their weight increases by more than 3 lbs in 1 day or 5 lbs in 1 week.   Labs next week

## 2023-04-03 NOTE — PROGRESS NOTES
Subjective:     HPI:  Ms. Dove is a very pleasant  43 y.o. year old white female with Takayasu's arteritis, severe obesity, HTN who was referred by our colleague Dr. Teofilo Trimble for evaluation and management of newly diagnosed CMP. This is her 3rd visit with me. Patient was diagnosed with Takayasu's arteriris over a year ago (followed by Dr. Rea (Rheumatology) and remains stable on Humira and methotrexate). She underwent a coronary angiogram that showed normal coronary arteries but had a right subclavian artery stenosis. Clinically reported NYHA class II-III symptoms. Occasional PND. Of note her most recent echo showed a dropped in her LV EF (from 55% to 35-40% by my read). During her 1st visit with me she was not on HF GDMT so I started low dose Entresto and 24/26 mg BID and metoprolol succinate 25 mg at night. I had further increased her metoprolol to 50 mg at night and added spironolactone 25 mg daily. Yesterday, she had to got to the ED for an acute episode of chest pain. Also reports having frequent palpitations. A full work-up that included an  ECG, cardiac enzymes an d-dimers was unremarkable. In addition a CTA of chest/abdomen and pelvis showed no vascular abnormalities with no evidence of arteritis . Her BP today in clinic is 137/81 mm of Hg. HR is 108 bpm.  Labs reviewed today and are WNL.      2D Echo with CFD done on 11/02/2022  The left ventricle is mildly enlarged with mild eccentric hypertrophy and moderately decreased systolic function.  The estimated ejection fraction is 35%.  Grade I left ventricular diastolic dysfunction.  Normal right ventricular size with normal right ventricular systolic function.  Mild aortic regurgitation.  Intermediate central venous pressure (8 mmHg).    Past Medical History:   Diagnosis Date    Takayasu's arteritis      Past Surgical History:   Procedure Laterality Date    ABDOMINAL SURGERY      BREAST BIOPSY      HERNIA REPAIR      HYSTERECTOMY    "   OOPHORECTOMY      TONSILLECTOMY       OB History          3    Para   3    Term   3       0    AB   0    Living             SAB   0    IAB   0    Ectopic   0    Multiple        Live Births                   Review of Systems   Constitutional: Positive for weight gain. Negative for chills, decreased appetite, diaphoresis, fever, malaise/fatigue, night sweats and weight loss.   Eyes: Negative.    Cardiovascular:  Positive for chest pain, dyspnea on exertion, irregular heartbeat and palpitations. Negative for claudication, cyanosis, leg swelling, near-syncope, orthopnea, paroxysmal nocturnal dyspnea and syncope.   Respiratory:  Negative for cough, hemoptysis and shortness of breath.    Endocrine: Negative.    Hematologic/Lymphatic: Negative.    Skin:  Negative for color change, dry skin and nail changes.   Musculoskeletal: Negative.    Gastrointestinal: Negative.    Genitourinary: Negative.    Neurological:  Negative for weakness.     Objective:   Blood pressure 137/81, pulse 108, height 5' 6" (1.676 m), weight (!) 152.5 kg (336 lb 3.2 oz).body mass index is 54.26 kg/m².  Physical Exam  Vitals and nursing note reviewed.   Constitutional:       Appearance: She is well-developed.   HENT:      Head: Normocephalic.   Eyes:      Pupils: Pupils are equal, round, and reactive to light.   Neck:      Vascular: No JVD.   Cardiovascular:      Rate and Rhythm: Normal rate and regular rhythm.      Chest Wall: PMI is displaced.      Pulses: Intact distal pulses.      Heart sounds: Normal heart sounds. No murmur heard.    No friction rub. No gallop.   Pulmonary:      Effort: Pulmonary effort is normal.      Breath sounds: Normal breath sounds. No wheezing or rales.   Abdominal:      General: Bowel sounds are normal.      Palpations: Abdomen is soft.   Musculoskeletal:      Cervical back: Neck supple.   Neurological:      Mental Status: She is alert and oriented to person, place, and time.       Labs:    Chemistry  "       Component Value Date/Time     04/02/2023 1758    K 3.8 04/02/2023 1758     04/02/2023 1758    CO2 28 04/02/2023 1758    BUN 6 (L) 04/02/2023 1758    CREATININE 0.48 (L) 04/02/2023 1758    GLU 91 04/02/2023 1758        Component Value Date/Time    CALCIUM 9.3 04/02/2023 1758    ALKPHOS 60 04/02/2023 1758    AST 65 (H) 04/02/2023 1758    ALT 84 (H) 04/02/2023 1758    BILITOT 0.6 04/02/2023 1758    ESTGFRAFRICA >60.0 06/02/2022 0859    EGFRNONAA >60.0 06/02/2022 0859          Magnesium   Date Value Ref Range Status   04/02/2023 2.1 1.6 - 2.6 mg/dL Final     Lab Results   Component Value Date    WBC 5.94 04/02/2023    HGB 15.1 04/02/2023    HCT 44.3 04/02/2023     04/02/2023     Lab Results   Component Value Date    INR 1.0 09/21/2022     BNP   Date Value Ref Range Status   11/30/2022 <10 0 - 99 pg/mL Final     Comment:     Values of less than 100 pg/ml are consistent with non-CHF populations.   10/28/2021 <10 0 - 99 pg/mL Final     Comment:     Values of less than 100 pg/ml are consistent with non-CHF populations.     Assessment:      1. Chronic combined systolic and diastolic heart failure    2. Palpitations    3. Chest pain    4. Takayasu's arteritis    5. Subclavian arterial stenosis    6. Severe obesity (BMI >= 40)    7. PVD (peripheral vascular disease)    8. FUNMILAYO (obstructive sleep apnea)    9. Benign essential HTN        Plan:   Stage C HFrEF ( as mentioned in previous visit, recent drop in EF likely related to uncontrolled HTN).   Continue to optimize GDMT.  Start Dapagliflozin 10 mg daily (every morning)  Increase metoprolol succinate to 100 mg at night   In view of her current symptoms of palpitations, will get a30 day cardiac event monitor  Recommend 2 gram sodium restriction and 1500cc fluid restriction.  Encourage physical activity with graded exercise program.  Requested patient to weigh themselves daily, and to notify us if their weight increases by more than 3 lbs in 1 day or 5  lbs in 1 week.   Labs next week  RTC in 3 months with labs     Kristopher Holguin MD

## 2023-04-04 ENCOUNTER — PATIENT OUTREACH (OUTPATIENT)
Dept: ADMINISTRATIVE | Facility: HOSPITAL | Age: 43
End: 2023-04-04
Payer: COMMERCIAL

## 2023-04-04 ENCOUNTER — CLINICAL SUPPORT (OUTPATIENT)
Dept: CARDIOLOGY | Facility: HOSPITAL | Age: 43
End: 2023-04-04
Attending: INTERNAL MEDICINE
Payer: COMMERCIAL

## 2023-04-04 DIAGNOSIS — R00.2 PALPITATIONS: ICD-10-CM

## 2023-04-04 PROCEDURE — 93272 ECG/REVIEW INTERPRET ONLY: CPT | Mod: ,,, | Performed by: INTERNAL MEDICINE

## 2023-04-04 PROCEDURE — 93272 CARDIAC EVENT MONITOR (CUPID ONLY): ICD-10-PCS | Mod: ,,, | Performed by: INTERNAL MEDICINE

## 2023-04-04 PROCEDURE — 93270 REMOTE 30 DAY ECG REV/REPORT: CPT

## 2023-04-05 ENCOUNTER — HOSPITAL ENCOUNTER (OUTPATIENT)
Dept: RADIOLOGY | Facility: HOSPITAL | Age: 43
Discharge: HOME | End: 2023-04-05
Attending: INTERNAL MEDICINE
Payer: COMMERCIAL

## 2023-04-05 ENCOUNTER — OFFICE VISIT (OUTPATIENT)
Dept: URGENT CARE | Facility: CLINIC | Age: 43
End: 2023-04-05
Payer: COMMERCIAL

## 2023-04-05 VITALS
OXYGEN SATURATION: 97 % | HEIGHT: 66 IN | RESPIRATION RATE: 18 BRPM | SYSTOLIC BLOOD PRESSURE: 101 MMHG | WEIGHT: 293 LBS | DIASTOLIC BLOOD PRESSURE: 64 MMHG | HEART RATE: 85 BPM | BODY MASS INDEX: 47.09 KG/M2 | TEMPERATURE: 98 F

## 2023-04-05 DIAGNOSIS — N61.0 CELLULITIS OF LEFT BREAST: Primary | ICD-10-CM

## 2023-04-05 PROCEDURE — 71275 CT ANGIOGRAPHY CHEST: CPT | Mod: TC,PO

## 2023-04-05 PROCEDURE — 71275 CT ANGIOGRAPHY CHEST: CPT | Mod: 26,,, | Performed by: RADIOLOGY

## 2023-04-05 PROCEDURE — 71275 CTA CHEST AORTA NON CORONARY: ICD-10-PCS | Mod: 26,,, | Performed by: RADIOLOGY

## 2023-04-05 PROCEDURE — 99213 OFFICE O/P EST LOW 20 MIN: CPT | Mod: S$GLB,,, | Performed by: PHYSICIAN ASSISTANT

## 2023-04-05 PROCEDURE — 99213 PR OFFICE/OUTPT VISIT, EST, LEVL III, 20-29 MIN: ICD-10-PCS | Mod: S$GLB,,, | Performed by: PHYSICIAN ASSISTANT

## 2023-04-05 PROCEDURE — 25500020 PHARM REV CODE 255: Mod: PO | Performed by: INTERNAL MEDICINE

## 2023-04-05 RX ORDER — CEPHALEXIN 500 MG/1
500 CAPSULE ORAL EVERY 6 HOURS
Qty: 40 CAPSULE | Refills: 0 | Status: SHIPPED | OUTPATIENT
Start: 2023-04-05 | End: 2023-04-15

## 2023-04-05 RX ADMIN — IOHEXOL 100 ML: 350 INJECTION, SOLUTION INTRAVENOUS at 02:04

## 2023-04-05 NOTE — PROGRESS NOTES
"Subjective:      Patient ID: Kelly Dove is a 43 y.o. female.    Vitals:  height is 5' 6" (1.676 m) and weight is 149.7 kg (330 lb) (abnormal). Her oral temperature is 98 °F (36.7 °C). Her blood pressure is 101/64 and her pulse is 85. Her respiration is 18 and oxygen saturation is 97%.     Chief Complaint: swollen breast    Patient is with spouse on exam. Patient presents to urgent care with red area to L breast that she noticed 2 days ago. Patient is not currently breastfeeding. Patient denies fever, chills, night sweats and unintentional weight loss. Patient denies body aches, active CP, SOB, abdominal pain, N/V/D/C or headache. Patient denies known injury/trauma to L breast. Patient reports no PMHx of breast or ovarian cancer. Patient reports PFHx of ovarian cancer. Patient denies excessive caffeine or alcohol use or stress. Patient has not tried anything OTC prior to arrival.    Other  This is a new problem. The current episode started in the past 7 days (2 days ago). The problem occurs constantly. The problem has been unchanged. Pertinent negatives include no abdominal pain, anorexia, arthralgias, change in bowel habit, chest pain, chills, congestion, coughing, diaphoresis, fatigue, fever, headaches, joint swelling, myalgias, nausea, neck pain, numbness, rash, sore throat, swollen glands, urinary symptoms, vertigo, visual change, vomiting or weakness. Nothing aggravates the symptoms. Treatments tried: bactroban RX and tried to pick it and express herself in shower. The treatment provided no relief.   Constitution: Negative for chills, sweating, fatigue and fever.   HENT:  Negative for ear pain, drooling, congestion, sore throat, trouble swallowing and voice change.    Neck: Negative for neck pain, neck stiffness, painful lymph nodes and neck swelling.   Cardiovascular:  Negative for chest pain, leg swelling, palpitations, sob on exertion and passing out.   Eyes:  Negative for eye pain, eye redness, " photophobia, double vision, blurred vision and eyelid swelling.   Respiratory:  Negative for chest tightness, cough, sputum production, bloody sputum, shortness of breath, stridor and wheezing.    Gastrointestinal:  Negative for abdominal pain, abdominal bloating, nausea, vomiting, constipation, diarrhea and heartburn.   Musculoskeletal:  Negative for joint pain, joint swelling, abnormal ROM of joint, back pain, muscle cramps and muscle ache.   Skin:  Positive for erythema and abscess. Negative for rash and hives.   Allergic/Immunologic: Negative for seasonal allergies, food allergies, hives, itching and sneezing.   Neurological:  Negative for dizziness, history of vertigo, light-headedness, passing out, loss of balance, headaches, altered mental status, loss of consciousness, numbness and seizures.   Hematologic/Lymphatic: Negative for swollen lymph nodes.   Psychiatric/Behavioral:  Negative for altered mental status and nervous/anxious. The patient is not nervous/anxious.     Objective:     Physical Exam   Constitutional: She is oriented to person, place, and time. She appears well-developed. She is cooperative.  Non-toxic appearance. She does not appear ill. No distress.   HENT:   Head: Normocephalic and atraumatic.   Ears:   Right Ear: External ear normal.   Left Ear: External ear normal.   Nose: Nose normal.   Mouth/Throat: Uvula is midline, oropharynx is clear and moist and mucous membranes are normal. No trismus in the jaw. Normal dentition. No uvula swelling. No oropharyngeal exudate, posterior oropharyngeal edema or posterior oropharyngeal erythema.   Eyes: Conjunctivae and lids are normal. No scleral icterus.   Neck: Trachea normal and phonation normal. Neck supple. No edema present. No erythema present. No neck rigidity present.   Cardiovascular: Normal rate, regular rhythm, normal heart sounds and normal pulses.   Pulmonary/Chest: Effort normal and breath sounds normal. No respiratory distress. She has  no decreased breath sounds. She has no rhonchi.   Abdominal: Normal appearance.   Musculoskeletal: Normal range of motion.         General: No deformity. Normal range of motion.   Lymphadenopathy:     She has no cervical adenopathy.   Neurological: She is alert and oriented to person, place, and time. She exhibits normal muscle tone. Coordination normal.   Skin: Skin is warm, dry, intact, not diaphoretic, not pale, no rash, not vesicular and abscessed. Capillary refill takes less than 2 seconds. erythema and lesion No abrasion, No burn, No bruising, No ecchymosis and No petechiae        Psychiatric: Her speech is normal and behavior is normal. Judgment and thought content normal.   Nursing note and vitals reviewed.    Assessment:     1. Cellulitis of left breast        Plan:   Advised close follow-up with PCP and/or Specialist for further evaluation as needed. STRICT ER precautions given to patient as well. Patient aware, verbalized understanding and agreed with plan of care.    Cellulitis of left breast    Other orders  -     cephALEXin (KEFLEX) 500 MG capsule; Take 1 capsule (500 mg total) by mouth every 6 (six) hours. for 10 days  Dispense: 40 capsule; Refill: 0      Patient Instructions   You must understand that you've received an Urgent Care treatment only and that you may be released before all your medical problems are known or treated. You, the patient, will arrange for follow up care as instructed.  Follow up with your PCP or specialty clinic as directed if not improved or as needed. You can call [phone] to schedule an appointment with the appropriate provider.  If your condition worsens we recommend that you receive another evaluation at the Emergency Department for any concerns or worsening of condition.  Patient aware and verbalized understanding.    Drink plenty of fluids and get lots of rest.  Avoid picking or manipulating the wound.  You can wash with regular antibacterial soap and water and  then apply bactroban ointment as needed.  Warm compresses/warm soaks with epsom salt for comfort/pain relief.  Keflex RX as prescribed for skin infection.  Follow-up with your PCP and/or Dermatology for further evaluation as needed.  Strict ER precautions given to patient.  Patient aware, verbalized understanding and agreed with plan of care.

## 2023-04-06 ENCOUNTER — PATIENT MESSAGE (OUTPATIENT)
Dept: RHEUMATOLOGY | Facility: CLINIC | Age: 43
End: 2023-04-06
Payer: COMMERCIAL

## 2023-04-06 NOTE — PATIENT INSTRUCTIONS
You must understand that you've received an Urgent Care treatment only and that you may be released before all your medical problems are known or treated. You, the patient, will arrange for follow up care as instructed.  Follow up with your PCP or specialty clinic as directed if not improved or as needed. You can call [phone] to schedule an appointment with the appropriate provider.  If your condition worsens we recommend that you receive another evaluation at the Emergency Department for any concerns or worsening of condition.  Patient aware and verbalized understanding.    Drink plenty of fluids and get lots of rest.  Avoid picking or manipulating the wound.  You can wash with regular antibacterial soap and water and then apply bactroban ointment as needed.  Warm compresses/warm soaks with epsom salt for comfort/pain relief.  Keflex RX as prescribed for skin infection.  Follow-up with your PCP and/or Dermatology for further evaluation as needed.  Strict ER precautions given to patient.  Patient aware, verbalized understanding and agreed with plan of care.

## 2023-04-07 ENCOUNTER — TELEPHONE (OUTPATIENT)
Dept: PHARMACY | Facility: CLINIC | Age: 43
End: 2023-04-07
Payer: COMMERCIAL

## 2023-04-10 ENCOUNTER — HOSPITAL ENCOUNTER (OUTPATIENT)
Dept: RADIOLOGY | Facility: HOSPITAL | Age: 43
Discharge: HOME | End: 2023-04-10
Attending: INTERNAL MEDICINE
Payer: COMMERCIAL

## 2023-04-10 ENCOUNTER — TELEPHONE (OUTPATIENT)
Dept: TRANSPLANT | Facility: CLINIC | Age: 43
End: 2023-04-10
Payer: COMMERCIAL

## 2023-04-10 PROCEDURE — 75635 CT ANGIO ABDOMINAL ARTERIES: CPT | Mod: 26,,, | Performed by: RADIOLOGY

## 2023-04-10 PROCEDURE — 75635 CTA RUNOFF ABD PEL BILAT LOWER EXT: ICD-10-PCS | Mod: 26,,, | Performed by: RADIOLOGY

## 2023-04-10 PROCEDURE — 75635 CT ANGIO ABDOMINAL ARTERIES: CPT | Mod: TC,PO

## 2023-04-10 PROCEDURE — 25500020 PHARM REV CODE 255: Mod: PO | Performed by: INTERNAL MEDICINE

## 2023-04-10 RX ADMIN — IOHEXOL 100 ML: 350 INJECTION, SOLUTION INTRAVENOUS at 05:04

## 2023-04-10 NOTE — TELEPHONE ENCOUNTER
Received the following message from [facility] this am:    The prior authorization for Kelly Dove's Farxiga prescription has been APPROVED FROM 4/4/2023 TO 4/3/2024 with copayment of $15.   (Jayme)    Noted Dr. Gusman 4/3 clinic note as well as pt on todays nurse lab phone review  Since med just approved via PA process thinking pt may not have started medication yet, and therefore will not need lab scheduled today    Just called and spoke w/ pt   Pt is aware FArxiga is approved, but has not yet picked it up  She is aware lab today was in relation to this medication  Pt plans to have RX transferred from [facility] to a closer pharmacy near her home.  Pt will let this office know once she has the medication so we can reschedule the lab appt and understands the importance of this.

## 2023-04-11 ENCOUNTER — PATIENT MESSAGE (OUTPATIENT)
Dept: RHEUMATOLOGY | Facility: CLINIC | Age: 43
End: 2023-04-11
Payer: COMMERCIAL

## 2023-04-13 ENCOUNTER — PATIENT MESSAGE (OUTPATIENT)
Dept: RHEUMATOLOGY | Facility: CLINIC | Age: 43
End: 2023-04-13
Payer: COMMERCIAL

## 2023-04-15 ENCOUNTER — PATIENT MESSAGE (OUTPATIENT)
Dept: TRANSPLANT | Facility: CLINIC | Age: 43
End: 2023-04-15
Payer: COMMERCIAL

## 2023-04-17 ENCOUNTER — OFFICE VISIT (OUTPATIENT)
Dept: FAMILY MEDICINE | Facility: CLINIC | Age: 43
End: 2023-04-17
Payer: COMMERCIAL

## 2023-04-17 ENCOUNTER — SPECIALTY PHARMACY (OUTPATIENT)
Dept: PHARMACY | Facility: CLINIC | Age: 43
End: 2023-04-17
Payer: COMMERCIAL

## 2023-04-17 VITALS
BODY MASS INDEX: 47.09 KG/M2 | HEART RATE: 73 BPM | DIASTOLIC BLOOD PRESSURE: 86 MMHG | SYSTOLIC BLOOD PRESSURE: 138 MMHG | OXYGEN SATURATION: 98 % | WEIGHT: 293 LBS | HEIGHT: 66 IN

## 2023-04-17 DIAGNOSIS — Z79.899 DRUG-INDUCED IMMUNODEFICIENCY: ICD-10-CM

## 2023-04-17 DIAGNOSIS — I50.20 HFREF (HEART FAILURE WITH REDUCED EJECTION FRACTION): Primary | ICD-10-CM

## 2023-04-17 DIAGNOSIS — G47.33 OSA (OBSTRUCTIVE SLEEP APNEA): ICD-10-CM

## 2023-04-17 DIAGNOSIS — M31.4 ARTERITIS, TAKAYASU: ICD-10-CM

## 2023-04-17 DIAGNOSIS — I10 BENIGN ESSENTIAL HTN: ICD-10-CM

## 2023-04-17 DIAGNOSIS — N61.0 CELLULITIS OF LEFT BREAST: ICD-10-CM

## 2023-04-17 DIAGNOSIS — E66.01 MORBID OBESITY: ICD-10-CM

## 2023-04-17 DIAGNOSIS — Z23 NEED FOR VACCINATION: ICD-10-CM

## 2023-04-17 DIAGNOSIS — D84.821 DRUG-INDUCED IMMUNODEFICIENCY: ICD-10-CM

## 2023-04-17 PROCEDURE — 90471 PNEUMOCOCCAL CONJUGATE VACCINE 20-VALENT: ICD-10-PCS | Mod: S$GLB,,, | Performed by: FAMILY MEDICINE

## 2023-04-17 PROCEDURE — 3075F SYST BP GE 130 - 139MM HG: CPT | Mod: CPTII,S$GLB,, | Performed by: FAMILY MEDICINE

## 2023-04-17 PROCEDURE — 99214 OFFICE O/P EST MOD 30 MIN: CPT | Mod: 25,S$GLB,, | Performed by: FAMILY MEDICINE

## 2023-04-17 PROCEDURE — 1159F MED LIST DOCD IN RCRD: CPT | Mod: CPTII,S$GLB,, | Performed by: FAMILY MEDICINE

## 2023-04-17 PROCEDURE — 3075F PR MOST RECENT SYSTOLIC BLOOD PRESS GE 130-139MM HG: ICD-10-PCS | Mod: CPTII,S$GLB,, | Performed by: FAMILY MEDICINE

## 2023-04-17 PROCEDURE — 4010F PR ACE/ARB THEARPY RXD/TAKEN: ICD-10-PCS | Mod: CPTII,S$GLB,, | Performed by: FAMILY MEDICINE

## 2023-04-17 PROCEDURE — 99999 PR PBB SHADOW E&M-EST. PATIENT-LVL IV: CPT | Mod: PBBFAC,,, | Performed by: FAMILY MEDICINE

## 2023-04-17 PROCEDURE — 4010F ACE/ARB THERAPY RXD/TAKEN: CPT | Mod: CPTII,S$GLB,, | Performed by: FAMILY MEDICINE

## 2023-04-17 PROCEDURE — 90471 IMMUNIZATION ADMIN: CPT | Mod: S$GLB,,, | Performed by: FAMILY MEDICINE

## 2023-04-17 PROCEDURE — 3008F PR BODY MASS INDEX (BMI) DOCUMENTED: ICD-10-PCS | Mod: CPTII,S$GLB,, | Performed by: FAMILY MEDICINE

## 2023-04-17 PROCEDURE — 3079F DIAST BP 80-89 MM HG: CPT | Mod: CPTII,S$GLB,, | Performed by: FAMILY MEDICINE

## 2023-04-17 PROCEDURE — 1159F PR MEDICATION LIST DOCUMENTED IN MEDICAL RECORD: ICD-10-PCS | Mod: CPTII,S$GLB,, | Performed by: FAMILY MEDICINE

## 2023-04-17 PROCEDURE — 90677 PCV20 VACCINE IM: CPT | Mod: S$GLB,,, | Performed by: FAMILY MEDICINE

## 2023-04-17 PROCEDURE — 99999 PR PBB SHADOW E&M-EST. PATIENT-LVL IV: ICD-10-PCS | Mod: PBBFAC,,, | Performed by: FAMILY MEDICINE

## 2023-04-17 PROCEDURE — 3008F BODY MASS INDEX DOCD: CPT | Mod: CPTII,S$GLB,, | Performed by: FAMILY MEDICINE

## 2023-04-17 PROCEDURE — 1160F RVW MEDS BY RX/DR IN RCRD: CPT | Mod: CPTII,S$GLB,, | Performed by: FAMILY MEDICINE

## 2023-04-17 PROCEDURE — 99214 PR OFFICE/OUTPT VISIT, EST, LEVL IV, 30-39 MIN: ICD-10-PCS | Mod: 25,S$GLB,, | Performed by: FAMILY MEDICINE

## 2023-04-17 PROCEDURE — 90677 PNEUMOCOCCAL CONJUGATE VACCINE 20-VALENT: ICD-10-PCS | Mod: S$GLB,,, | Performed by: FAMILY MEDICINE

## 2023-04-17 PROCEDURE — 3079F PR MOST RECENT DIASTOLIC BLOOD PRESSURE 80-89 MM HG: ICD-10-PCS | Mod: CPTII,S$GLB,, | Performed by: FAMILY MEDICINE

## 2023-04-17 PROCEDURE — 1160F PR REVIEW ALL MEDS BY PRESCRIBER/CLIN PHARMACIST DOCUMENTED: ICD-10-PCS | Mod: CPTII,S$GLB,, | Performed by: FAMILY MEDICINE

## 2023-04-17 RX ORDER — SULFAMETHOXAZOLE AND TRIMETHOPRIM 800; 160 MG/1; MG/1
1 TABLET ORAL 2 TIMES DAILY
Qty: 20 TABLET | Refills: 0 | Status: SHIPPED | OUTPATIENT
Start: 2023-04-17 | End: 2023-04-20

## 2023-04-17 NOTE — TELEPHONE ENCOUNTER
1000 am: Received and noted the following message from pt that was sent to this office via her [employer] on 4/15/23:     Hey Mrs. Little,     I wanted to let you know I finally got the farxiga transferred to my pharmacy and started taking it today. Thanks! Im available any time for my lab work. I prefer to get it done in [place] or [place].      Crystal        Here is my reply , just sent to pt        Good morning     Thank you for letting us know     We will schedule your lab appt for this Thursday 4/20/23 mid am @ the [facility]     Sincerely  Anabel      Will now send amessage to  MA asking she schedule this lab appt w/ Re: Started Farxiga 4/15

## 2023-04-17 NOTE — TELEPHONE ENCOUNTER
Spoke with pt- she is currently holding MTX and Humira due to cellulitis infection of the breast.  Diagnosed 4/5/23 at Urgent Care. She was treated with abx Keflex and has a f/u apt today. Pt agreed to [facility] follow up call after her apt to confirm if her cellulitis has resolved and if resuming Humira/MTX would be appropriate.

## 2023-04-17 NOTE — PROGRESS NOTES
"Subjective:       Patient ID: Kelly Dove is a 43 y.o. female.    Chief Complaint: Follow-up    Here today to follow up on chronic medical conditions.  She has meet with an integrative medicine MD and is working to reduce sugar, flour and dairy.     Takayasu's arteritis:  dx over a year ago (followed by Dr. Rea (Rheumatology)).  She is stable on Humira and methotrexate which causes immunosupression.   CHF:HTN:  her BP has been controlled.  She is seeing Cardiology and now Transplant team.  She is on Entresto, Spironolactone and Toprol.  Farxiga was recently added  Perpherial Neuropathy:  Lyrica increased to 100 mg BID to be taken with her Cymbalta 120 mg.  Breast Cellultis:  went to  and was put on Kelfex which she completed yesterday.  Still redness in area.    Follow-up  Pertinent negatives include no abdominal pain, chest pain, coughing, fatigue, fever, headaches or nausea.   Review of Systems   Constitutional:  Negative for activity change, appetite change, fatigue and fever.   Respiratory:  Negative for cough, shortness of breath and wheezing.    Cardiovascular:  Negative for chest pain and palpitations.   Gastrointestinal:  Negative for abdominal pain, constipation, diarrhea and nausea.   Neurological:  Negative for dizziness, syncope, light-headedness and headaches.     Objective:      Vitals:    04/17/23 1339   BP: 138/86   Pulse: 73   SpO2: 98%   Weight: (!) 152 kg (335 lb 1.6 oz)   Height: 5' 6" (1.676 m)      Physical Exam  Constitutional:       General: She is not in acute distress.     Appearance: She is obese.   Cardiovascular:      Rate and Rhythm: Normal rate and regular rhythm.      Heart sounds: Normal heart sounds. No murmur heard.  Pulmonary:      Effort: Pulmonary effort is normal. No respiratory distress.      Breath sounds: Normal breath sounds. No wheezing, rhonchi or rales.   Skin:     General: Skin is warm and dry.      Findings: Erythema (medial aspect of left breast - no " abscess) present.          Neurological:      General: No focal deficit present.      Mental Status: She is alert.   Psychiatric:         Mood and Affect: Mood normal.         Behavior: Behavior normal.         Thought Content: Thought content normal.       Results for orders placed or performed during the hospital encounter of 04/02/23   Magnesium   Result Value Ref Range    Magnesium 2.1 1.6 - 2.6 mg/dL   CBC auto differential   Result Value Ref Range    WBC 5.94 3.90 - 12.70 K/uL    RBC 4.80 4.00 - 5.40 M/uL    Hemoglobin 15.1 12.0 - 16.0 g/dL    Hematocrit 44.3 37.0 - 48.5 %    MCV 92 82 - 98 fL    MCH 31.5 (H) 27.0 - 31.0 pg    MCHC 34.1 32.0 - 36.0 g/dL    RDW 14.3 11.5 - 14.5 %    Platelets 190 150 - 450 K/uL    MPV 11.4 9.2 - 12.9 fL    Immature Granulocytes 0.2 0.0 - 0.5 %    Gran # (ANC) 2.9 1.8 - 7.7 K/uL    Immature Grans (Abs) 0.01 0.00 - 0.04 K/uL    Lymph # 2.2 1.0 - 4.8 K/uL    Mono # 0.5 0.3 - 1.0 K/uL    Eos # 0.3 0.0 - 0.5 K/uL    Baso # 0.02 0.00 - 0.20 K/uL    nRBC 0 0 /100 WBC    Gran % 48.7 38.0 - 73.0 %    Lymph % 37.0 18.0 - 48.0 %    Mono % 8.9 4.0 - 15.0 %    Eosinophil % 4.9 0.0 - 8.0 %    Basophil % 0.3 0.0 - 1.9 %    Differential Method Automated    Comprehensive metabolic panel   Result Value Ref Range    Sodium 141 136 - 145 mmol/L    Potassium 3.8 3.5 - 5.1 mmol/L    Chloride 105 95 - 110 mmol/L    CO2 28 22 - 31 mmol/L    Glucose 91 70 - 110 mg/dL    BUN 6 (L) 7 - 18 mg/dL    Creatinine 0.48 (L) 0.50 - 1.40 mg/dL    Calcium 9.3 8.4 - 10.2 mg/dL    Total Protein 8.0 6.0 - 8.4 g/dL    Albumin 4.7 3.5 - 5.2 g/dL    Total Bilirubin 0.6 0.2 - 1.3 mg/dL    Alkaline Phosphatase 60 38 - 145 U/L    AST 65 (H) 14 - 36 U/L    ALT 84 (H) 0 - 35 U/L    Anion Gap 8 8 - 16 mmol/L    eGFR >60 >60 mL/min/1.73 m^2   NT-Pro Natriuretic Peptide   Result Value Ref Range    NT-proBNP <20 5 - 450 pg/mL   Troponin   Result Value Ref Range    Troponin I <0.012 0.012 - 0.034 ng/mL   Troponin in 2 Hours    Result Value Ref Range    Troponin I <0.012 0.012 - 0.034 ng/mL   D dimer, quantitative   Result Value Ref Range    D-Dimer 0.31 <0.50 ug/mL FEU      Assessment:       1. HFrEF (heart failure with reduced ejection fraction)    2. Arteritis, Takayasu    3. Benign essential HTN    4. FUNMILAYO (obstructive sleep apnea)    5. Morbid obesity    6. Drug-induced immunodeficiency    7. Need for vaccination    8. Cellulitis of left breast        Plan:       HFrEF (heart failure with reduced ejection fraction)  Due to Arteritis.  On Medical therapy per Cardiology and transplant team  Arteritis, Takayasu  On Medication management per rheum.  Benign essential HTN  BP controlled at this time.  FUNMILAYO (obstructive sleep apnea)    Morbid obesity  Working on weight loss  Drug-induced immunodeficiency    Need for vaccination  -     Pneumococcal Conjugate Vaccine (20 Valent) (IM)    Cellulitis of left breast  -     sulfamethoxazole-trimethoprim 800-160mg (BACTRIM DS) 800-160 mg Tab; Take 1 tablet by mouth 2 (two) times daily. for 10 days  Dispense: 20 tablet; Refill: 0  Start Bactrim and close f/u        Medication List with Changes/Refills   New Medications    SULFAMETHOXAZOLE-TRIMETHOPRIM 800-160MG (BACTRIM DS) 800-160 MG TAB    Take 1 tablet by mouth 2 (two) times daily. for 10 days   Current Medications    ACETAMINOPHEN (TYLENOL) 500 MG TABLET    Take 2 tablets (1,000 mg total) by mouth every 8 (eight) hours as needed for Pain.    ADALIMUMAB (HUMIRA PEN) PNKT INJECTION    Inject 1 pen (40 mg total) into the skin every 14 (fourteen) days.    ASPIRIN 81 MG CHEW    Take 81 mg by mouth once daily.    BUTALBITAL-ACETAMINOPHEN-CAFFEINE -40 MG (FIORICET, ESGIC) -40 MG PER TABLET    Take 1 tablet by mouth every 6 (six) hours as needed.    CYANOCOBALAMIN, VITAMIN B-12, (VITAMIN B-12 ORAL)    Take by mouth.    DAPAGLIFLOZIN (FARXIGA) 10 MG TABLET    Take 1 tablet (10 mg total) by mouth once daily.    DULOXETINE (CYMBALTA) 60 MG  CAPSULE    TAKE ONE CAPSULE BY MOUTH TWICE DAILY    EPINEPHRINE (EPIPEN) 0.3 MG/0.3 ML ATIN    Inject 0.3 mLs (0.3 mg total) into the muscle once. for 1 dose    FLUTICASONE PROPIONATE (FLONASE) 50 MCG/ACTUATION NASAL SPRAY    1 spray (50 mcg total) by Each Nostril route once daily.    FOLIC ACID (FOLVITE) 1 MG TABLET    Take 1 tablet (1 mg total) by mouth 3 (three) times daily.    METHOTREXATE 25 MG/ML INJECTION    Inject 0.8 mL into the skin weekly    METOPROLOL SUCCINATE (TOPROL-XL) 50 MG 24 HR TABLET    Take 2 tablets (100 mg total) by mouth every evening.    PANTOPRAZOLE (PROTONIX) 40 MG TABLET    Take 40 mg by mouth once daily.    PREGABALIN (LYRICA) 150 MG CAPSULE    Take 1 capsule (150 mg total) by mouth 2 (two) times daily.    PROMETHAZINE (PHENERGAN) 25 MG TABLET    25 MG WITH EVERY MTX WHICH IS ONCE A WEEK    SACUBITRIL-VALSARTAN (ENTRESTO) 24-26 MG PER TABLET    Take 1 tablet by mouth 2 (two) times daily.    SPIRONOLACTONE (ALDACTONE) 25 MG TABLET    Take 12.5 mg (half a tab) daily for 1 week. Labs next week. If K and creat are stable increase to 25 g daily.    VITAMIN D (VITAMIN D3) 1000 UNITS TAB    Take 1 tablet (1,000 Units total) by mouth once daily.

## 2023-04-19 DIAGNOSIS — G62.9 SMALL FIBER NEUROPATHY: ICD-10-CM

## 2023-04-19 NOTE — TELEPHONE ENCOUNTER
pregabalin (LYRICA) 100 MG capsule     Sig: TAKE 1 CAPSULE BY MOUTH TWICE DAILY    Disp:  180 capsule    Refills:  Not specified    Start: 4/17/2023    Class: Normal    Last refill: 1/18/2023     Anticonvulsants Protocol Passed 04/17/2023 07:09 AM   Protocol Details  Visit with Authorizing provider in past 9 months or upcoming 90 days    No active pregnancy on record      To be filled at: [id number] - [place], [place] - [address] AT [address]

## 2023-04-20 ENCOUNTER — OFFICE VISIT (OUTPATIENT)
Dept: FAMILY MEDICINE | Facility: CLINIC | Age: 43
End: 2023-04-20
Payer: COMMERCIAL

## 2023-04-20 ENCOUNTER — TELEPHONE (OUTPATIENT)
Dept: TRANSPLANT | Facility: CLINIC | Age: 43
End: 2023-04-20

## 2023-04-20 ENCOUNTER — LAB VISIT (OUTPATIENT)
Dept: LAB | Facility: HOSPITAL | Age: 43
End: 2023-04-20
Attending: INTERNAL MEDICINE
Payer: COMMERCIAL

## 2023-04-20 VITALS
HEART RATE: 78 BPM | SYSTOLIC BLOOD PRESSURE: 126 MMHG | HEIGHT: 66 IN | BODY MASS INDEX: 47.09 KG/M2 | WEIGHT: 293 LBS | OXYGEN SATURATION: 97 % | RESPIRATION RATE: 18 BRPM | DIASTOLIC BLOOD PRESSURE: 84 MMHG

## 2023-04-20 DIAGNOSIS — R51.9 NONINTRACTABLE HEADACHE, UNSPECIFIED CHRONICITY PATTERN, UNSPECIFIED HEADACHE TYPE: Primary | ICD-10-CM

## 2023-04-20 DIAGNOSIS — I50.42 CHRONIC COMBINED SYSTOLIC AND DIASTOLIC HEART FAILURE: ICD-10-CM

## 2023-04-20 DIAGNOSIS — N61.0 CELLULITIS OF LEFT BREAST: Primary | ICD-10-CM

## 2023-04-20 LAB
ANION GAP SERPL CALC-SCNC: 13 MMOL/L (ref 8–16)
BUN SERPL-MCNC: 8 MG/DL (ref 6–20)
CALCIUM SERPL-MCNC: 9.7 MG/DL (ref 8.7–10.5)
CHLORIDE SERPL-SCNC: 103 MMOL/L (ref 95–110)
CO2 SERPL-SCNC: 24 MMOL/L (ref 23–29)
CREAT SERPL-MCNC: 0.8 MG/DL (ref 0.5–1.4)
EST. GFR  (NO RACE VARIABLE): >60 ML/MIN/1.73 M^2
GLUCOSE SERPL-MCNC: 92 MG/DL (ref 70–110)
POTASSIUM SERPL-SCNC: 5 MMOL/L (ref 3.5–5.1)
SODIUM SERPL-SCNC: 140 MMOL/L (ref 136–145)

## 2023-04-20 PROCEDURE — 3079F PR MOST RECENT DIASTOLIC BLOOD PRESSURE 80-89 MM HG: ICD-10-PCS | Mod: CPTII,S$GLB,, | Performed by: FAMILY MEDICINE

## 2023-04-20 PROCEDURE — 1159F MED LIST DOCD IN RCRD: CPT | Mod: CPTII,S$GLB,, | Performed by: FAMILY MEDICINE

## 2023-04-20 PROCEDURE — 3074F SYST BP LT 130 MM HG: CPT | Mod: CPTII,S$GLB,, | Performed by: FAMILY MEDICINE

## 2023-04-20 PROCEDURE — 99213 OFFICE O/P EST LOW 20 MIN: CPT | Mod: S$GLB,,, | Performed by: FAMILY MEDICINE

## 2023-04-20 PROCEDURE — 3008F BODY MASS INDEX DOCD: CPT | Mod: CPTII,S$GLB,, | Performed by: FAMILY MEDICINE

## 2023-04-20 PROCEDURE — 1159F PR MEDICATION LIST DOCUMENTED IN MEDICAL RECORD: ICD-10-PCS | Mod: CPTII,S$GLB,, | Performed by: FAMILY MEDICINE

## 2023-04-20 PROCEDURE — 99213 PR OFFICE/OUTPT VISIT, EST, LEVL III, 20-29 MIN: ICD-10-PCS | Mod: S$GLB,,, | Performed by: FAMILY MEDICINE

## 2023-04-20 PROCEDURE — 4010F PR ACE/ARB THEARPY RXD/TAKEN: ICD-10-PCS | Mod: CPTII,S$GLB,, | Performed by: FAMILY MEDICINE

## 2023-04-20 PROCEDURE — 36415 COLL VENOUS BLD VENIPUNCTURE: CPT | Mod: PO | Performed by: INTERNAL MEDICINE

## 2023-04-20 PROCEDURE — 99999 PR PBB SHADOW E&M-EST. PATIENT-LVL V: CPT | Mod: PBBFAC,,, | Performed by: FAMILY MEDICINE

## 2023-04-20 PROCEDURE — 3008F PR BODY MASS INDEX (BMI) DOCUMENTED: ICD-10-PCS | Mod: CPTII,S$GLB,, | Performed by: FAMILY MEDICINE

## 2023-04-20 PROCEDURE — 3079F DIAST BP 80-89 MM HG: CPT | Mod: CPTII,S$GLB,, | Performed by: FAMILY MEDICINE

## 2023-04-20 PROCEDURE — 3074F PR MOST RECENT SYSTOLIC BLOOD PRESSURE < 130 MM HG: ICD-10-PCS | Mod: CPTII,S$GLB,, | Performed by: FAMILY MEDICINE

## 2023-04-20 PROCEDURE — 99999 PR PBB SHADOW E&M-EST. PATIENT-LVL V: ICD-10-PCS | Mod: PBBFAC,,, | Performed by: FAMILY MEDICINE

## 2023-04-20 PROCEDURE — 4010F ACE/ARB THERAPY RXD/TAKEN: CPT | Mod: CPTII,S$GLB,, | Performed by: FAMILY MEDICINE

## 2023-04-20 PROCEDURE — 80048 BASIC METABOLIC PNL TOTAL CA: CPT | Performed by: INTERNAL MEDICINE

## 2023-04-20 RX ORDER — BUTALBITAL, ACETAMINOPHEN AND CAFFEINE 50; 325; 40 MG/1; MG/1; MG/1
1 TABLET ORAL EVERY 6 HOURS PRN
Qty: 12 TABLET | Refills: 5 | Status: SHIPPED | OUTPATIENT
Start: 2023-04-20 | End: 2023-11-09

## 2023-04-20 RX ORDER — PREGABALIN 150 MG/1
150 CAPSULE ORAL 2 TIMES DAILY
Qty: 180 CAPSULE | Refills: 1 | Status: SHIPPED | OUTPATIENT
Start: 2023-04-20 | End: 2023-11-09

## 2023-04-20 RX ORDER — FLUCONAZOLE 150 MG/1
150 TABLET ORAL DAILY
Qty: 1 TABLET | Refills: 1 | Status: SHIPPED | OUTPATIENT
Start: 2023-04-20 | End: 2023-04-21

## 2023-04-20 RX ORDER — DOXYCYCLINE 100 MG/1
100 CAPSULE ORAL 2 TIMES DAILY
Qty: 20 CAPSULE | Refills: 0 | Status: SHIPPED | OUTPATIENT
Start: 2023-04-20 | End: 2023-04-30

## 2023-04-20 NOTE — TELEPHONE ENCOUNTER
Spoke with Dr. Palmer and confirmed the 150mg dose is the correct dose for the lyrica. I instructed patient on this dose as well. She has asked for a fioricet refill

## 2023-04-20 NOTE — PROGRESS NOTES
"Subjective:       Patient ID: Kelly Dove is a 43 y.o. female.    Chief Complaint: Follow-up    Here today to f/u on cellulitis L breast.  We started her on Bactrim at her last visit (4/17) as she appeared to have treatment failure with keflex.  She has tolerated the antibiotic well.  She does not feel that the redness or tenderness has improved much    Review of Systems   Constitutional:  Negative for activity change, appetite change and fever.   Respiratory:  Negative for cough, shortness of breath and wheezing.    Cardiovascular:  Negative for chest pain and palpitations.   Gastrointestinal:  Negative for abdominal pain, constipation, diarrhea and nausea.   Skin:  Negative for pallor and rash.   Neurological:  Negative for dizziness, syncope, light-headedness and headaches.     Objective:      Vitals:    04/20/23 0806   BP: 126/84   Pulse: 78   Resp: 18   SpO2: 97%   Weight: (!) 151 kg (332 lb 14.3 oz)   Height: 5' 6" (1.676 m)      Physical Exam  Constitutional:       General: She is not in acute distress.  Cardiovascular:      Rate and Rhythm: Normal rate and regular rhythm.      Heart sounds: Normal heart sounds. No murmur heard.  Pulmonary:      Effort: Pulmonary effort is normal. No respiratory distress.      Breath sounds: Normal breath sounds. No wheezing, rhonchi or rales.   Chest:   Breasts:     Left: Skin change (erythema - no sig change) present.       Neurological:      General: No focal deficit present.      Mental Status: She is alert.   Psychiatric:         Mood and Affect: Mood normal.         Behavior: Behavior normal.         Thought Content: Thought content normal.       Results for orders placed or performed during the hospital encounter of 04/02/23   Magnesium   Result Value Ref Range    Magnesium 2.1 1.6 - 2.6 mg/dL   CBC auto differential   Result Value Ref Range    WBC 5.94 3.90 - 12.70 K/uL    RBC 4.80 4.00 - 5.40 M/uL    Hemoglobin 15.1 12.0 - 16.0 g/dL    Hematocrit 44.3 37.0 - 48.5 " %    MCV 92 82 - 98 fL    MCH 31.5 (H) 27.0 - 31.0 pg    MCHC 34.1 32.0 - 36.0 g/dL    RDW 14.3 11.5 - 14.5 %    Platelets 190 150 - 450 K/uL    MPV 11.4 9.2 - 12.9 fL    Immature Granulocytes 0.2 0.0 - 0.5 %    Gran # (ANC) 2.9 1.8 - 7.7 K/uL    Immature Grans (Abs) 0.01 0.00 - 0.04 K/uL    Lymph # 2.2 1.0 - 4.8 K/uL    Mono # 0.5 0.3 - 1.0 K/uL    Eos # 0.3 0.0 - 0.5 K/uL    Baso # 0.02 0.00 - 0.20 K/uL    nRBC 0 0 /100 WBC    Gran % 48.7 38.0 - 73.0 %    Lymph % 37.0 18.0 - 48.0 %    Mono % 8.9 4.0 - 15.0 %    Eosinophil % 4.9 0.0 - 8.0 %    Basophil % 0.3 0.0 - 1.9 %    Differential Method Automated    Comprehensive metabolic panel   Result Value Ref Range    Sodium 141 136 - 145 mmol/L    Potassium 3.8 3.5 - 5.1 mmol/L    Chloride 105 95 - 110 mmol/L    CO2 28 22 - 31 mmol/L    Glucose 91 70 - 110 mg/dL    BUN 6 (L) 7 - 18 mg/dL    Creatinine 0.48 (L) 0.50 - 1.40 mg/dL    Calcium 9.3 8.4 - 10.2 mg/dL    Total Protein 8.0 6.0 - 8.4 g/dL    Albumin 4.7 3.5 - 5.2 g/dL    Total Bilirubin 0.6 0.2 - 1.3 mg/dL    Alkaline Phosphatase 60 38 - 145 U/L    AST 65 (H) 14 - 36 U/L    ALT 84 (H) 0 - 35 U/L    Anion Gap 8 8 - 16 mmol/L    eGFR >60 >60 mL/min/1.73 m^2   NT-Pro Natriuretic Peptide   Result Value Ref Range    NT-proBNP <20 5 - 450 pg/mL   Troponin   Result Value Ref Range    Troponin I <0.012 0.012 - 0.034 ng/mL   Troponin in 2 Hours   Result Value Ref Range    Troponin I <0.012 0.012 - 0.034 ng/mL   D dimer, quantitative   Result Value Ref Range    D-Dimer 0.31 <0.50 ug/mL FEU      Assessment:       1. Cellulitis of left breast        Plan:       Cellulitis of left breast  -     doxycycline (MONODOX) 100 MG capsule; Take 1 capsule (100 mg total) by mouth 2 (two) times daily. for 10 days  Dispense: 20 capsule; Refill: 0  -     fluconazole (DIFLUCAN) 150 MG Tab; Take 1 tablet (150 mg total) by mouth once daily. for 1 day  Dispense: 1 tablet; Refill: 1    Add Doxycycline to Bactrim today.        Medication List  with Changes/Refills   New Medications    DOXYCYCLINE (MONODOX) 100 MG CAPSULE    Take 1 capsule (100 mg total) by mouth 2 (two) times daily. for 10 days    FLUCONAZOLE (DIFLUCAN) 150 MG TAB    Take 1 tablet (150 mg total) by mouth once daily. for 1 day   Current Medications    ACETAMINOPHEN (TYLENOL) 500 MG TABLET    Take 2 tablets (1,000 mg total) by mouth every 8 (eight) hours as needed for Pain.    ADALIMUMAB (HUMIRA PEN) PNKT INJECTION    Inject 1 pen (40 mg total) into the skin every 14 (fourteen) days.    ASPIRIN 81 MG CHEW    Take 81 mg by mouth once daily.    BUTALBITAL-ACETAMINOPHEN-CAFFEINE -40 MG (FIORICET, ESGIC) -40 MG PER TABLET    Take 1 tablet by mouth every 6 (six) hours as needed.    CYANOCOBALAMIN, VITAMIN B-12, (VITAMIN B-12 ORAL)    Take by mouth.    DAPAGLIFLOZIN (FARXIGA) 10 MG TABLET    Take 1 tablet (10 mg total) by mouth once daily.    DULOXETINE (CYMBALTA) 60 MG CAPSULE    TAKE ONE CAPSULE BY MOUTH TWICE DAILY    EPINEPHRINE (EPIPEN) 0.3 MG/0.3 ML ATIN    Inject 0.3 mLs (0.3 mg total) into the muscle once. for 1 dose    FLUTICASONE PROPIONATE (FLONASE) 50 MCG/ACTUATION NASAL SPRAY    1 spray (50 mcg total) by Each Nostril route once daily.    FOLIC ACID (FOLVITE) 1 MG TABLET    Take 1 tablet (1 mg total) by mouth 3 (three) times daily.    METHOTREXATE 25 MG/ML INJECTION    Inject 0.8 mL into the skin weekly    METOPROLOL SUCCINATE (TOPROL-XL) 50 MG 24 HR TABLET    Take 2 tablets (100 mg total) by mouth every evening.    PANTOPRAZOLE (PROTONIX) 40 MG TABLET    Take 40 mg by mouth once daily.    PREGABALIN (LYRICA) 100 MG CAPSULE    TAKE 1 CAPSULE BY MOUTH TWICE DAILY    PROMETHAZINE (PHENERGAN) 25 MG TABLET    25 MG WITH EVERY MTX WHICH IS ONCE A WEEK    SACUBITRIL-VALSARTAN (ENTRESTO) 24-26 MG PER TABLET    Take 1 tablet by mouth 2 (two) times daily.    SPIRONOLACTONE (ALDACTONE) 25 MG TABLET    Take 12.5 mg (half a tab) daily for 1 week. Labs next week. If K and creat are  stable increase to 25 g daily.    VITAMIN D (VITAMIN D3) 1000 UNITS TAB    Take 1 tablet (1,000 Units total) by mouth once daily.   Changed and/or Refilled Medications    Modified Medication Previous Medication    PREGABALIN (LYRICA) 150 MG CAPSULE pregabalin (LYRICA) 150 MG capsule       Take 1 capsule (150 mg total) by mouth 2 (two) times daily.    Take 1 capsule (150 mg total) by mouth 2 (two) times daily.   Discontinued Medications    SULFAMETHOXAZOLE-TRIMETHOPRIM 800-160MG (BACTRIM DS) 800-160 MG TAB    Take 1 tablet by mouth 2 (two) times daily. for 10 days

## 2023-04-20 NOTE — TELEPHONE ENCOUNTER
4:50 pm:  F/U on todays nurse lab phone reminder  BMP in epic K 5.0 form 3.8  Re for this lab: pt started Farxiga 4/15-one week bmp    Will route this to Dr. Holguin for his awareness of increase K to see if he wants to repeat lab, make med changes or other   And   Will send him a phone message as well.

## 2023-04-21 ENCOUNTER — TELEPHONE (OUTPATIENT)
Dept: TRANSPLANT | Facility: CLINIC | Age: 43
End: 2023-04-21

## 2023-04-21 DIAGNOSIS — I50.42 CHRONIC COMBINED SYSTOLIC AND DIASTOLIC HEART FAILURE: Primary | ICD-10-CM

## 2023-04-21 NOTE — TELEPHONE ENCOUNTER
0750 am:  communicated w/ Dr. Holguin and he wanted to confirm pts aldactone dose-told him 25 mg once daily   TORB: Dr. Sheppard/Yanick, RN:  Review pts potassium supplements and diet, and make sure she is not taking any potassium , bananas, or juices  Repeat BMP Tuesday 4/25/23    0910 am : Called and spoke w/ pt  Reviewed K level yesterday of 5.0 and up from last draw of 3.8   Asked and pt confirmed she is taking a full table of Spironolactone 25 mg once daily   denies taking any prescribed or otc potassium supplements    Discussed diet: denies bananas, and citrus fruits and juices , other than mandarins 2-3 a few times a week.  Asked and eats an avacado dip w/ celery sticks 2-3 times a week -small serving  Asked pt to cut back each of these by half and to also not ingest bananas, orange juice or other citrus fruits and/or juices , avoid strawberries  pt in agreement    Additionally pt regarding repeat lab:   Pt leaves for [place] Monday 4/24 am , returning May 7th    Pt is familiar with this area stating she lived there in the past    Pt will determine a hospital facility her insurance will cover her to go to, contact that facility, inform them she needs to have a Basic Metabollic Panel drawn and see where the order needs to be sent   She will then call our office and/or send a [email or web address] message w/ the name of the facility , phone and fax number so we can send a BMP order per Dr. Holguin    Pt will try to have all worked out by Tuesday, but may take an additional day    She plans to start working on this today

## 2023-04-24 NOTE — TELEPHONE ENCOUNTER
Outgoing call - s/w patient, she stated that the plan to resume therapy will be to complete her 10 days of antibiotic therapy (planned date of completion: 5/1); patient stated she does have some Humira and MTX on hand, but not the exact count at the moment.     Will adjust her next refill call for 5/1.

## 2023-04-25 ENCOUNTER — OFFICE VISIT (OUTPATIENT)
Dept: FAMILY MEDICINE | Facility: CLINIC | Age: 43
End: 2023-04-25
Payer: COMMERCIAL

## 2023-04-25 DIAGNOSIS — N61.0 CELLULITIS OF LEFT BREAST: Primary | ICD-10-CM

## 2023-04-25 PROCEDURE — 4010F ACE/ARB THERAPY RXD/TAKEN: CPT | Mod: CPTII,95,, | Performed by: FAMILY MEDICINE

## 2023-04-25 PROCEDURE — 99213 OFFICE O/P EST LOW 20 MIN: CPT | Mod: 95,,, | Performed by: FAMILY MEDICINE

## 2023-04-25 PROCEDURE — 1159F PR MEDICATION LIST DOCUMENTED IN MEDICAL RECORD: ICD-10-PCS | Mod: CPTII,95,, | Performed by: FAMILY MEDICINE

## 2023-04-25 PROCEDURE — 1160F RVW MEDS BY RX/DR IN RCRD: CPT | Mod: CPTII,95,, | Performed by: FAMILY MEDICINE

## 2023-04-25 PROCEDURE — 1160F PR REVIEW ALL MEDS BY PRESCRIBER/CLIN PHARMACIST DOCUMENTED: ICD-10-PCS | Mod: CPTII,95,, | Performed by: FAMILY MEDICINE

## 2023-04-25 PROCEDURE — 1159F MED LIST DOCD IN RCRD: CPT | Mod: CPTII,95,, | Performed by: FAMILY MEDICINE

## 2023-04-25 PROCEDURE — 4010F PR ACE/ARB THEARPY RXD/TAKEN: ICD-10-PCS | Mod: CPTII,95,, | Performed by: FAMILY MEDICINE

## 2023-04-25 PROCEDURE — 99213 PR OFFICE/OUTPT VISIT, EST, LEVL III, 20-29 MIN: ICD-10-PCS | Mod: 95,,, | Performed by: FAMILY MEDICINE

## 2023-04-25 NOTE — PROGRESS NOTES
Subjective:       Patient ID: Kelly Dove is a 43 y.o. female.    Chief Complaint: Rash    The patient location is: [place]  The chief complaint leading to consultation is: Cellulitis    Visit type: audiovisual    Face to Face time with patient: 15  20 minutes of total time spent on the encounter, which includes face to face time and non-face to face time preparing to see the patient (eg, review of tests), Obtaining and/or reviewing separately obtained history, Documenting clinical information in the electronic or other health record, Independently interpreting results (not separately reported) and communicating results to the patient/family/caregiver, or Care coordination (not separately reported).     Each patient to whom he or she provides medical services by telemedicine is:  (1) informed of the relationship between the physician and patient and the respective role of any other health care provider with respect to management of the patient; and (2) notified that he or she may decline to receive medical services by telemedicine and may withdraw from such care at any time.     Here today to f/u on cellulitis of her left breast.  At her last visit, we added Doxycycline to her bactrim.  About 2 days ago, she was able to express a small amount of white discharge.  Red area is persistent      Rash  Pertinent negatives include no diarrhea, rhinorrhea or vomiting.   Review of Systems   Constitutional:  Negative for activity change and unexpected weight change.   HENT:  Negative for hearing loss, rhinorrhea and trouble swallowing.    Eyes:  Negative for discharge and visual disturbance.   Respiratory:  Negative for chest tightness and wheezing.    Cardiovascular:  Positive for chest pain and palpitations.   Gastrointestinal:  Negative for blood in stool, constipation, diarrhea and vomiting.   Endocrine: Negative for polydipsia and polyuria.   Genitourinary:  Negative for difficulty urinating, dysuria, hematuria and  menstrual problem.   Musculoskeletal:  Negative for arthralgias, joint swelling and neck pain.   Skin:  Positive for rash.   Neurological:  Positive for weakness and headaches.   Psychiatric/Behavioral:  Negative for confusion and dysphoric mood.      Objective:      There were no vitals filed for this visit.   Physical Exam  Constitutional:       Appearance: Normal appearance.   Neurological:      General: No focal deficit present.      Mental Status: She is alert and oriented to person, place, and time.   Psychiatric:         Mood and Affect: Mood normal.         Thought Content: Thought content normal.       Results for orders placed or performed in visit on 04/20/23   Basic Metabolic Panel   Result Value Ref Range    Sodium 140 136 - 145 mmol/L    Potassium 5.0 3.5 - 5.1 mmol/L    Chloride 103 95 - 110 mmol/L    CO2 24 23 - 29 mmol/L    Glucose 92 70 - 110 mg/dL    BUN 8 6 - 20 mg/dL    Creatinine 0.8 0.5 - 1.4 mg/dL    Calcium 9.7 8.7 - 10.5 mg/dL    Anion Gap 13 8 - 16 mmol/L    eGFR >60.0 >60 mL/min/1.73 m^2      Assessment:       1. Cellulitis of left breast        Plan:       Cellulitis of left breast  -     Ambulatory referral/consult to Breast Surgery; Future; Expected date: 05/02/2023    Continue and finish abx.  Will attempt to get her in with a breast surgeon sooner than later.      Medication List with Changes/Refills   Current Medications    ACETAMINOPHEN (TYLENOL) 500 MG TABLET    Take 2 tablets (1,000 mg total) by mouth every 8 (eight) hours as needed for Pain.    ADALIMUMAB (HUMIRA PEN) PNKT INJECTION    Inject 1 pen (40 mg total) into the skin every 14 (fourteen) days.    ASPIRIN 81 MG CHEW    Take 81 mg by mouth once daily.    BUTALBITAL-ACETAMINOPHEN-CAFFEINE -40 MG (FIORICET, ESGIC) -40 MG PER TABLET    Take 1 tablet by mouth every 6 (six) hours as needed for Headaches (Limit to 3 tab per week to avoid rebound headache).    CYANOCOBALAMIN, VITAMIN B-12, (VITAMIN B-12 ORAL)    Take  by mouth.    DAPAGLIFLOZIN (FARXIGA) 10 MG TABLET    Take 1 tablet (10 mg total) by mouth once daily.    DOXYCYCLINE (MONODOX) 100 MG CAPSULE    Take 1 capsule (100 mg total) by mouth 2 (two) times daily. for 10 days    DULOXETINE (CYMBALTA) 60 MG CAPSULE    TAKE ONE CAPSULE BY MOUTH TWICE DAILY    EPINEPHRINE (EPIPEN) 0.3 MG/0.3 ML ATIN    Inject 0.3 mLs (0.3 mg total) into the muscle once. for 1 dose    FLUTICASONE PROPIONATE (FLONASE) 50 MCG/ACTUATION NASAL SPRAY    1 spray (50 mcg total) by Each Nostril route once daily.    FOLIC ACID (FOLVITE) 1 MG TABLET    Take 1 tablet (1 mg total) by mouth 3 (three) times daily.    METHOTREXATE 25 MG/ML INJECTION    Inject 0.8 mL into the skin weekly    METOPROLOL SUCCINATE (TOPROL-XL) 50 MG 24 HR TABLET    Take 2 tablets (100 mg total) by mouth every evening.    PANTOPRAZOLE (PROTONIX) 40 MG TABLET    Take 40 mg by mouth once daily.    PREGABALIN (LYRICA) 150 MG CAPSULE    Take 1 capsule (150 mg total) by mouth 2 (two) times daily.    PROMETHAZINE (PHENERGAN) 25 MG TABLET    25 MG WITH EVERY MTX WHICH IS ONCE A WEEK    SACUBITRIL-VALSARTAN (ENTRESTO) 24-26 MG PER TABLET    Take 1 tablet by mouth 2 (two) times daily.    SPIRONOLACTONE (ALDACTONE) 25 MG TABLET    Take 12.5 mg (half a tab) daily for 1 week. Labs next week. If K and creat are stable increase to 25 g daily.    VITAMIN D (VITAMIN D3) 1000 UNITS TAB    Take 1 tablet (1,000 Units total) by mouth once daily.

## 2023-04-26 ENCOUNTER — TELEPHONE (OUTPATIENT)
Dept: TRANSPLANT | Facility: CLINIC | Age: 43
End: 2023-04-26
Payer: COMMERCIAL

## 2023-04-26 NOTE — TELEPHONE ENCOUNTER
5:20 pm: F/U on todays nurse lab phone reminder  [facility]  BMP:   Na/K:  137/4.3   Cl/Co2:  104/23   Bun/Cr:  13/77   See previous NN : lab was to recheck K    Called pt at this time and informed her of normal and improved K as well as other lab results normal from today collection.     Asked and pt said she did make some dietary changes as recommenced during our last conversation.  Advised pt to continue those dietary changes. And she agreed.

## 2023-05-01 ENCOUNTER — PATIENT MESSAGE (OUTPATIENT)
Dept: NEUROLOGY | Facility: CLINIC | Age: 43
End: 2023-05-01
Payer: COMMERCIAL

## 2023-05-01 DIAGNOSIS — M31.4 ARTERITIS, TAKAYASU: Primary | ICD-10-CM

## 2023-05-01 RX ORDER — PREDNISONE 10 MG/1
20 TABLET ORAL DAILY
Qty: 14 TABLET | Refills: 0 | Status: SHIPPED | OUTPATIENT
Start: 2023-05-01 | End: 2023-08-11

## 2023-05-02 ENCOUNTER — PATIENT MESSAGE (OUTPATIENT)
Dept: FAMILY MEDICINE | Facility: CLINIC | Age: 43
End: 2023-05-02
Payer: COMMERCIAL

## 2023-05-03 NOTE — TELEPHONE ENCOUNTER
Called patient and informed her that Dr. Erazo advised that she be seen by a breast specialist ASAP. Patient verbalized understanding and responded that she is currently out of town but she will try to find a specialist where she is. I provided her with our fax number so that she can send us the records. Advised patient to keep us updated via [employer] messages.

## 2023-05-18 ENCOUNTER — OFFICE VISIT (OUTPATIENT)
Dept: FAMILY MEDICINE | Facility: CLINIC | Age: 43
End: 2023-05-18
Payer: COMMERCIAL

## 2023-05-18 VITALS
WEIGHT: 293 LBS | HEART RATE: 95 BPM | SYSTOLIC BLOOD PRESSURE: 136 MMHG | BODY MASS INDEX: 47.09 KG/M2 | HEIGHT: 66 IN | DIASTOLIC BLOOD PRESSURE: 84 MMHG | OXYGEN SATURATION: 98 %

## 2023-05-18 DIAGNOSIS — M31.4 ARTERITIS, TAKAYASU: ICD-10-CM

## 2023-05-18 DIAGNOSIS — R23.4 BREAST SKIN CHANGES: Primary | ICD-10-CM

## 2023-05-18 PROCEDURE — 1160F PR REVIEW ALL MEDS BY PRESCRIBER/CLIN PHARMACIST DOCUMENTED: ICD-10-PCS | Mod: CPTII,S$GLB,, | Performed by: FAMILY MEDICINE

## 2023-05-18 PROCEDURE — 4010F ACE/ARB THERAPY RXD/TAKEN: CPT | Mod: CPTII,S$GLB,, | Performed by: FAMILY MEDICINE

## 2023-05-18 PROCEDURE — 3079F PR MOST RECENT DIASTOLIC BLOOD PRESSURE 80-89 MM HG: ICD-10-PCS | Mod: CPTII,S$GLB,, | Performed by: FAMILY MEDICINE

## 2023-05-18 PROCEDURE — 3075F PR MOST RECENT SYSTOLIC BLOOD PRESS GE 130-139MM HG: ICD-10-PCS | Mod: CPTII,S$GLB,, | Performed by: FAMILY MEDICINE

## 2023-05-18 PROCEDURE — 1159F MED LIST DOCD IN RCRD: CPT | Mod: CPTII,S$GLB,, | Performed by: FAMILY MEDICINE

## 2023-05-18 PROCEDURE — 3075F SYST BP GE 130 - 139MM HG: CPT | Mod: CPTII,S$GLB,, | Performed by: FAMILY MEDICINE

## 2023-05-18 PROCEDURE — 3008F BODY MASS INDEX DOCD: CPT | Mod: CPTII,S$GLB,, | Performed by: FAMILY MEDICINE

## 2023-05-18 PROCEDURE — 1159F PR MEDICATION LIST DOCUMENTED IN MEDICAL RECORD: ICD-10-PCS | Mod: CPTII,S$GLB,, | Performed by: FAMILY MEDICINE

## 2023-05-18 PROCEDURE — 99214 OFFICE O/P EST MOD 30 MIN: CPT | Mod: S$GLB,,, | Performed by: FAMILY MEDICINE

## 2023-05-18 PROCEDURE — 99214 PR OFFICE/OUTPT VISIT, EST, LEVL IV, 30-39 MIN: ICD-10-PCS | Mod: S$GLB,,, | Performed by: FAMILY MEDICINE

## 2023-05-18 PROCEDURE — 3079F DIAST BP 80-89 MM HG: CPT | Mod: CPTII,S$GLB,, | Performed by: FAMILY MEDICINE

## 2023-05-18 PROCEDURE — 99999 PR PBB SHADOW E&M-EST. PATIENT-LVL V: ICD-10-PCS | Mod: PBBFAC,,, | Performed by: FAMILY MEDICINE

## 2023-05-18 PROCEDURE — 99999 PR PBB SHADOW E&M-EST. PATIENT-LVL V: CPT | Mod: PBBFAC,,, | Performed by: FAMILY MEDICINE

## 2023-05-18 PROCEDURE — 3008F PR BODY MASS INDEX (BMI) DOCUMENTED: ICD-10-PCS | Mod: CPTII,S$GLB,, | Performed by: FAMILY MEDICINE

## 2023-05-18 PROCEDURE — 1160F RVW MEDS BY RX/DR IN RCRD: CPT | Mod: CPTII,S$GLB,, | Performed by: FAMILY MEDICINE

## 2023-05-18 PROCEDURE — 4010F PR ACE/ARB THEARPY RXD/TAKEN: ICD-10-PCS | Mod: CPTII,S$GLB,, | Performed by: FAMILY MEDICINE

## 2023-05-18 NOTE — PROGRESS NOTES
"Subjective:       Patient ID: Kelly Dove is a 43 y.o. female.    Chief Complaint: Suture / Staple Removal (Two stitches )    Here today to f/u on her breast cellulitis.  No improvement on Abx and was seen by breast specialist.  She had a punch bx done (her pathology showed hemangioma).  She had US done which showed no underlying lesions.  She had 2 sutures placed which are due to be removed.  She notes 2 other spots have shown up.  She feels her left breast is "lam"    Suture / Staple Removal    Review of Systems   Constitutional:  Negative for activity change, appetite change, fatigue and fever.   Respiratory:  Negative for cough, shortness of breath and wheezing.    Cardiovascular:  Negative for chest pain and palpitations.   Gastrointestinal:  Negative for abdominal pain, constipation, diarrhea and nausea.   Neurological:  Negative for dizziness, syncope, light-headedness and headaches.     Objective:      Vitals:    05/18/23 1423   BP: 136/84   Pulse: 95   SpO2: 98%   Weight: (!) 149.4 kg (329 lb 5.9 oz)   Height: 5' 6" (1.676 m)      Physical Exam  Exam conducted with a chaperone present.   Chest:   Breasts:     Left: Skin change present.          Comments: 2 sutures removed from bx spot.  Patient had a bandaid over site and while removing it, the adhesive created a small skin tear inferior to the wound.  This area bled quite a bit.  We were able to achieve hemostasis with pressure.      Results for orders placed or performed in visit on 04/20/23   Basic Metabolic Panel   Result Value Ref Range    Sodium 140 136 - 145 mmol/L    Potassium 5.0 3.5 - 5.1 mmol/L    Chloride 103 95 - 110 mmol/L    CO2 24 23 - 29 mmol/L    Glucose 92 70 - 110 mg/dL    BUN 8 6 - 20 mg/dL    Creatinine 0.8 0.5 - 1.4 mg/dL    Calcium 9.7 8.7 - 10.5 mg/dL    Anion Gap 13 8 - 16 mmol/L    eGFR >60.0 >60 mL/min/1.73 m^2      Assessment:       1. Breast skin changes    2. Arteritis, Takayasu        Plan:       Breast skin changes  - "     Ambulatory referral to Breast Clinic; Future; Expected date: 05/19/2023    Arteritis, Faustinoayasu    Due to her continued issue and bx which I feel is inconclusive, we discussed her following up with breast surgeon.  Since she was seen in [place], we will place a referral here for an opinion.      Medication List with Changes/Refills   Current Medications    ACETAMINOPHEN (TYLENOL) 500 MG TABLET    Take 2 tablets (1,000 mg total) by mouth every 8 (eight) hours as needed for Pain.    ADALIMUMAB (HUMIRA PEN) PNKT INJECTION    Inject 1 pen (40 mg total) into the skin every 14 (fourteen) days.    ASPIRIN 81 MG CHEW    Take 81 mg by mouth once daily.    BUTALBITAL-ACETAMINOPHEN-CAFFEINE -40 MG (FIORICET, ESGIC) -40 MG PER TABLET    Take 1 tablet by mouth every 6 (six) hours as needed for Headaches (Limit to 3 tab per week to avoid rebound headache).    CYANOCOBALAMIN, VITAMIN B-12, (VITAMIN B-12 ORAL)    Take by mouth.    DAPAGLIFLOZIN (FARXIGA) 10 MG TABLET    Take 1 tablet (10 mg total) by mouth once daily.    DULOXETINE (CYMBALTA) 60 MG CAPSULE    TAKE ONE CAPSULE BY MOUTH TWICE DAILY    EPINEPHRINE (EPIPEN) 0.3 MG/0.3 ML ATIN    Inject 0.3 mLs (0.3 mg total) into the muscle once. for 1 dose    FLUTICASONE PROPIONATE (FLONASE) 50 MCG/ACTUATION NASAL SPRAY    1 spray (50 mcg total) by Each Nostril route once daily.    FOLIC ACID (FOLVITE) 1 MG TABLET    Take 1 tablet (1 mg total) by mouth 3 (three) times daily.    METHOTREXATE 25 MG/ML INJECTION    Inject 0.8 mL into the skin weekly    METOPROLOL SUCCINATE (TOPROL-XL) 50 MG 24 HR TABLET    Take 2 tablets (100 mg total) by mouth every evening.    PANTOPRAZOLE (PROTONIX) 40 MG TABLET    Take 40 mg by mouth once daily.    PREDNISONE (DELTASONE) 10 MG TABLET    Take 2 tablets (20 mg total) by mouth once daily.    PREGABALIN (LYRICA) 150 MG CAPSULE    Take 1 capsule (150 mg total) by mouth 2 (two) times daily.    PROMETHAZINE (PHENERGAN) 25 MG TABLET     25 MG WITH EVERY MTX WHICH IS ONCE A WEEK    SACUBITRIL-VALSARTAN (ENTRESTO) 24-26 MG PER TABLET    Take 1 tablet by mouth 2 (two) times daily.    SPIRONOLACTONE (ALDACTONE) 25 MG TABLET    Take 12.5 mg (half a tab) daily for 1 week. Labs next week. If K and creat are stable increase to 25 g daily.    VITAMIN D (VITAMIN D3) 1000 UNITS TAB    Take 1 tablet (1,000 Units total) by mouth once daily.

## 2023-05-19 ENCOUNTER — OFFICE VISIT (OUTPATIENT)
Dept: HEMATOLOGY/ONCOLOGY | Facility: CLINIC | Age: 43
End: 2023-05-19
Payer: COMMERCIAL

## 2023-05-19 VITALS
BODY MASS INDEX: 47.09 KG/M2 | HEIGHT: 66 IN | SYSTOLIC BLOOD PRESSURE: 129 MMHG | TEMPERATURE: 98 F | DIASTOLIC BLOOD PRESSURE: 82 MMHG | WEIGHT: 293 LBS | HEART RATE: 76 BPM | OXYGEN SATURATION: 98 %

## 2023-05-19 DIAGNOSIS — S20.112A ABRASION OF BREAST, LEFT BREAST, INITIAL ENCOUNTER: Primary | ICD-10-CM

## 2023-05-19 DIAGNOSIS — M31.4 TAKAYASU'S ARTERITIS: ICD-10-CM

## 2023-05-19 PROCEDURE — 3008F PR BODY MASS INDEX (BMI) DOCUMENTED: ICD-10-PCS | Mod: CPTII,S$GLB,, | Performed by: INTERNAL MEDICINE

## 2023-05-19 PROCEDURE — 3074F PR MOST RECENT SYSTOLIC BLOOD PRESSURE < 130 MM HG: ICD-10-PCS | Mod: CPTII,S$GLB,, | Performed by: INTERNAL MEDICINE

## 2023-05-19 PROCEDURE — 99999 PR PBB SHADOW E&M-EST. PATIENT-LVL V: CPT | Mod: PBBFAC,,, | Performed by: INTERNAL MEDICINE

## 2023-05-19 PROCEDURE — 3008F BODY MASS INDEX DOCD: CPT | Mod: CPTII,S$GLB,, | Performed by: INTERNAL MEDICINE

## 2023-05-19 PROCEDURE — 99205 OFFICE O/P NEW HI 60 MIN: CPT | Mod: S$GLB,,, | Performed by: INTERNAL MEDICINE

## 2023-05-19 PROCEDURE — 99999 PR PBB SHADOW E&M-EST. PATIENT-LVL V: ICD-10-PCS | Mod: PBBFAC,,, | Performed by: INTERNAL MEDICINE

## 2023-05-19 PROCEDURE — 99205 PR OFFICE/OUTPT VISIT, NEW, LEVL V, 60-74 MIN: ICD-10-PCS | Mod: S$GLB,,, | Performed by: INTERNAL MEDICINE

## 2023-05-19 PROCEDURE — 1159F PR MEDICATION LIST DOCUMENTED IN MEDICAL RECORD: ICD-10-PCS | Mod: CPTII,S$GLB,, | Performed by: INTERNAL MEDICINE

## 2023-05-19 PROCEDURE — 4010F ACE/ARB THERAPY RXD/TAKEN: CPT | Mod: CPTII,S$GLB,, | Performed by: INTERNAL MEDICINE

## 2023-05-19 PROCEDURE — 3079F DIAST BP 80-89 MM HG: CPT | Mod: CPTII,S$GLB,, | Performed by: INTERNAL MEDICINE

## 2023-05-19 PROCEDURE — 1159F MED LIST DOCD IN RCRD: CPT | Mod: CPTII,S$GLB,, | Performed by: INTERNAL MEDICINE

## 2023-05-19 PROCEDURE — 3074F SYST BP LT 130 MM HG: CPT | Mod: CPTII,S$GLB,, | Performed by: INTERNAL MEDICINE

## 2023-05-19 PROCEDURE — 4010F PR ACE/ARB THEARPY RXD/TAKEN: ICD-10-PCS | Mod: CPTII,S$GLB,, | Performed by: INTERNAL MEDICINE

## 2023-05-19 PROCEDURE — 3079F PR MOST RECENT DIASTOLIC BLOOD PRESSURE 80-89 MM HG: ICD-10-PCS | Mod: CPTII,S$GLB,, | Performed by: INTERNAL MEDICINE

## 2023-05-19 NOTE — Clinical Note
Would like to expedite workup and present at our next breast conference please. Can NN please obtain outside records from [place]? In media looks like requested but do not see actual records.

## 2023-05-19 NOTE — PROGRESS NOTES
Subjective:      DATE OF VISIT: 5/19/23     ?  Patient ID:?Kelly Dove is a 43 y.o. female.?? MR#: [id number]   ?   REFERRING PROVIDER: Aaareferral Self  No address on file     ? Primary Care Providers:  Horacio Erazo MD, MD (General)     CHIEF COMPLAINT: ??Left breast rash/lesion??   ?   ONCOLOGIC DIAGNOSIS:  None  ?   CURRENT TREATMENT:  Status post antibiotics    PAST TREATMENT:  N/a  ?   ONCOLOGIC HISTORY:   ?   Oncology History    No history exists.     Cancer Staging   No matching staging information was found for the patient.       HPI    I had the pleasure meeting Ms. 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  She self refers due to 1-2 months of persistent left breast rash/lesion.  She 1st noticed rash in early April 2023 diagnosed locally as possible cellulitis and underwent weeks of antibiotic treatment without improvement and progressive rash and lesion.  She had punch biopsy in [place] which per patient report showed hemangioma (awaiting receipt of outside record).  She notes having past mammogram and ultrasound without abnormality.  She did have past history of right breast abnormality per patient report biopsied benign.  Rash is tender mildly pruritic.  No asymmetry mass nodule within breast or nipple discharge or axillary adenopathy palpated.  She is not had unintentional weight loss.  Aside from left breast no other rash or  diagnoses of vasculitides.     Review of Systems    ?   A comprehensive 14-point review of systems was reviewed with patient and was negative other than as specified above.   ?   PAST MEDICAL HISTORY:   Past Medical History:   Diagnosis Date    Takayasu's arteritis     ?     PAST SURGICAL HISTORY:   Past Surgical History:   Procedure Laterality Date    ABDOMINAL SURGERY      BREAST BIOPSY      HERNIA REPAIR      HYSTERECTOMY      OOPHORECTOMY       TONSILLECTOMY        ?   ALLERGIES:   Allergies as of 05/19/2023 - Reviewed 05/19/2023   Allergen Reaction Noted    Iodinated contrast media Anaphylaxis and Swelling 04/11/2023      ?   MEDICATIONS:?   Outpatient Medications Marked as Taking for the 5/19/23 encounter (Office Visit) with Louann Mora MD   Medication Sig Dispense Refill    acetaminophen (TYLENOL) 500 MG tablet Take 2 tablets (1,000 mg total) by mouth every 8 (eight) hours as needed for Pain. 42 tablet 0    aspirin 81 MG Chew Take 81 mg by mouth once daily.      butalbital-acetaminophen-caffeine -40 mg (FIORICET, ESGIC) -40 mg per tablet Take 1 tablet by mouth every 6 (six) hours as needed for Headaches (Limit to 3 tab per week to avoid rebound headache). 12 tablet 5    cyanocobalamin, vitamin B-12, (VITAMIN B-12 ORAL) Take by mouth.      dapagliflozin (FARXIGA) 10 mg tablet Take 1 tablet (10 mg total) by mouth once daily. 90 tablet 3    DULoxetine (CYMBALTA) 60 MG capsule TAKE ONE CAPSULE BY MOUTH TWICE DAILY 180 capsule 3    fluticasone propionate (FLONASE) 50 mcg/actuation nasal spray 1 spray (50 mcg total) by Each Nostril route once daily. 16 g 3    methotrexate 25 mg/mL injection Inject 0.8 mL into the skin weekly 10 mL 1    metoprolol succinate (TOPROL-XL) 50 MG 24 hr tablet Take 2 tablets (100 mg total) by mouth every evening. 90 tablet 3    pantoprazole (PROTONIX) 40 MG tablet Take 40 mg by mouth once daily.      predniSONE (DELTASONE) 10 MG tablet Take 2 tablets (20 mg total) by mouth once daily. 14 tablet 0    pregabalin (LYRICA) 150 MG capsule Take 1 capsule (150 mg total) by mouth 2 (two) times daily. 180 capsule 1    promethazine (PHENERGAN) 25 MG tablet 25 MG WITH EVERY MTX WHICH IS ONCE A WEEK 20 tablet 0    sacubitriL-valsartan (ENTRESTO) 24-26 mg per tablet Take 1 tablet by mouth 2 (two) times daily. 90 tablet 3    spironolactone (ALDACTONE) 25 MG tablet Take 12.5 mg (half a tab) daily for 1 week. Labs next week. If  K and creat are stable increase to 25 g daily. 90 tablet 3    vitamin D (VITAMIN D3) 1000 units Tab Take 1 tablet (1,000 Units total) by mouth once daily. 30 tablet 2      ?   SOCIAL HISTORY:?   Social History     Tobacco Use    Smoking status: Former     Packs/day: 0.75     Types: Cigarettes     Quit date: 2022     Years since quittin.6    Smokeless tobacco: Never   Substance Use Topics    Alcohol use: Yes     Comment: social      ?      ?   FAMILY HISTORY:   family history includes Ovarian cancer in her mother.   ?   Ovarian cancer mother 44 years old     Objective:      Physical Exam      ?   Vitals:    23 1127   BP: 129/82   Pulse: 76   Temp: 97.5 °F (36.4 °C)      ?   ECOG:?0   General appearance: Generally well appearing, in no acute distress.   Head, eyes, ears, nose, and throat:  moist mucous membranes.   Respiratory:  Increased work of breathing   Breast:  Left breast with diffuse rash involving between 3:00  to 9:00 with mild necrotic changes, mildly tender to palpation, no palpated underlying mass or adenopathy.  No notable abnormality in right breast or axilla.    Abdomen: obese, nontender  Extremities: Warm, without edema.   Neurologic: Alert and oriented. Grossly normal strength, coordination, and gait.   Skin: No ecchymoses or petechial lesion.   ?      ?   Laboratory:  ?   No visits with results within 1 Day(s) from this visit.   Latest known visit with results is:   Lab Visit on 2023   Component Date Value Ref Range Status    Sodium 2023 140  136 - 145 mmol/L Final    Potassium 2023 5.0  3.5 - 5.1 mmol/L Final    Chloride 2023 103  95 - 110 mmol/L Final    CO2 2023 24  23 - 29 mmol/L Final    Glucose 2023 92  70 - 110 mg/dL Final    BUN 2023 8  6 - 20 mg/dL Final    Creatinine 2023 0.8  0.5 - 1.4 mg/dL Final    Calcium 2023 9.7  8.7 - 10.5 mg/dL Final    Anion Gap 2023 13  8 - 16 mmol/L Final    eGFR 2023 >60.0  >60  mL/min/1.73 m^2 Final      ?   Lab Results   Component Value Date    WBC 5.94 04/02/2023    HGB 15.1 04/02/2023    HCT 44.3 04/02/2023    MCV 92 04/02/2023     04/02/2023       ?   Imaging:  ?    Results for orders placed or performed during the hospital encounter of 04/02/23 (from the past 2160 hour(s))   CTA Chest Abdomen Pelvis    Narrative    EXAMINATION:  CTA CHEST ABDOMEN PELVIS    CLINICAL HISTORY:  Congenital aortic disease    TECHNIQUE:  Axial images of the chest abdomen and pelvis were obtained with IV contrast administration.  Coronal and sagittal reconstructions were provided.  Three dimensional and MIP images were obtained and evaluated.  Total DLP was 779 mGy-cm. Dose lowering technique and automated exposure control were utilized for this exam.    COMPARISON:  CTA of the chest 09/21/2022.    FINDINGS:  Vascular findings:    The aortic root is nonaneurysmal.  The ascending aorta is nonaneurysmal.  The aortic arch is nonaneurysmal.  The visualized great vessels are normal.  There is no aortic dissection.  The descending thoracic aorta is normal.  The abdominal aorta is normal.  The iliofemoral arteries are normal.  The celiac is widely patent.  The SMA is widely patent.  Bilateral renal arteries are widely patent.  The HEATHER is patent.  There is no arterial irregularity.  There is no arterial wall thickening.  There is no evidence of arteritis.  There is a splenic artery aneurysm measuring 12 mm adjacent to the splenic hilum.  This is unchanged.    Nonvascular findings:    The airway is widely patent.  The thyroid gland is normal.  There is no mediastinal or hilar lymphadenopathy.  The heart is not enlarged.  The lungs are clear.  There is no pulmonary nodule or mass.  There is no pleural effusion.    There is diffuse fatty infiltration of the liver.  The gallbladder is surgically absent.  The spleen, pancreas, adrenal glands, and kidneys are normal.  The stomach and small bowel are decompressed.   The colon is normal.  The urinary bladder is normal.  There is no pelvic or retroperitoneal lymphadenopathy.  There is no lytic or blastic osseous lesion.      Impression    No acute abnormality of the chest abdomen and pelvis.  There is no evidence of aortic dissection or aneurysm.    [facility] concordance      Electronically signed by: Cristhian Green MD  Date:    04/03/2023  Time:    08:00   Results for orders placed or performed during the hospital encounter of 03/29/23 (from the past 2160 hour(s))   CTA Runoff ABD PEL Bilat Lower Ext    Narrative    EXAMINATION:  CTA RUNOFF ABD PEL BILAT LOWER EXT    CLINICAL HISTORY:  patient with known takayasu's arteritis for evaluation of the disease;  Aortic arch syndrome (takayasu)    TECHNIQUE:  Thin-section images are obtained following the IV administration 100 cc of Omnipaque 350.  Images are obtained from the mid thorax to the ankles multiplanar reformations and is volume rendered images were obtained on the separate workstation and sent to the PACS    COMPARISON:  CTA of the chest, abdomen and pelvis performed April 2, 2023.    FINDINGS:  A few minutes following the injection of the IV contrast the patient experienced tingling about their here is and a of sensation of nasal and michael pharyngeal congestion.  I was called to evaluate and she was having symptoms of laryngeal edema.  Oxygen saturation and blood pressure within the for which a within normal limits and the patient was able to speak and sitting on the CT bed.  Given her difficulty inhaling she was given a 0.3 mg dose of epinephrine via a EpiPen to the right lateral thigh soft tissues.  She experienced almost immediate improved symptoms.  Emergency medical services were was called and she was taken to seen to [facility] to be further evaluated.  She should be considered to have a on the Omnipaque 350 allergy.    There is severe diffuse fatty liver infiltration.  There is evidence of prior  cholecystectomy.  Right lobe of the liver is enlarged at 17.2 cm.  The spleen is not enlarged at 10.8 cm in craniocaudal dimensions.  Degenerative changes are seen in the spine with vacuum phenomenon noted at L4/L5 and L5/S1.  The the adrenal glands are unremarkable.  In the splenic hilum there is a peripherally calcified but partially patent 13 mm splenic artery aneurysm.  This is unchanged relative to the April 2, 2023 exam there is no evidence of rupture or hemorrhage.  The there is a lower abdomen midline abdominal hernia which contains bowel and fat.  There is no evidence of incarceration.  Some degenerative changes are seen about the pubic symphysis.  There is evidence of prior hysterectomy.  There is no evidence of adenopathy or free fluid.  There is lateral patellar tilt and patellar degenerative changes bilaterally.  No focal masses or areas of osteolysis are seen in the lower extremity bony structures.    At the level of the celiac origin the abdominal aorta measures 2.1 cm in diameter.  There is mild atherosclerosis at the origin of the celiac.  The left and right main renal arteries demonstrate no significant stenosis nor does the origin of the superior mesenteric artery.  The inferior mesenteric artery is patent.  The infrarenal abdominal aorta demonstrates a diameter of 1.8 cm.  The common iliac arteries are patent bilaterally.  Both internal iliac arteries are patent and demonstrate minimal atherosclerosis.  There is also minimal atherosclerosis in the external iliac arteries bilaterally without evidence of significant stenosis.    The right common femoral and profundus femoris arteries are patent.  The right popliteal artery as well as the anterior tibial, posterior tibial and peroneal arteries are somewhat small but are diffusely patent without evidence of aneurysm or high-grade focal stenosis.  The left lower extremity also demonstrates a patent superficial femoral artery, common femoral artery and  from profundus femoris artery.  Popliteal, anterior tibial posterior tibial and peroneal artery peroneal arteries are diffusely patent without evidence of focal stenosis and are also of somewhat small caliber.  The proximal dorsalis pedis arteries are patent bilaterally.      Impression    Please note the patient had an allergic reaction and was evaluated and treated in the Department and eventually sent to the [facility] emergency room.    A minimal atherosclerosis with a 12 mm splenic artery aneurysm which is peripherally calcified but part partially patent.    No evidence of focal stenosis or aneurysm formation in the abdomen pelvis or either lower extremity.    Fatty liver infiltration and hepatomegaly.    Prior cholecystectomy and apparent hysterectomy.    Degenerative changes are seen in the spine and hips.      Electronically signed by: Horacio Owens MD  Date:    04/10/2023  Time:    19:10   Results for orders placed or performed during the hospital encounter of 03/29/23 (from the past 2160 hour(s))   CTA Head and Neck (xpd)    Narrative    EXAMINATION:  CTA HEAD AND NECK (XPD)    CLINICAL HISTORY:  patient with known Takayasu's arteritis for evaluation of the disease;Aortic arch syndrome (Takayasu)    TECHNIQUE:  Non contrast low dose axial images were obtained thought the head.  CT angiogram was performed from the level of the agustin to the top of the head following the IV administration of 100 mL of Omnipaque 350.   Sagittal and coronal reconstructions and maximum intensity projection reconstructions were performed. Arterial stenosis percentages are based on NASCET measurement criteria.    COMPARISON:  CTA head and neck dated 10/30/2021, brain MRI dated 04/03/2022    FINDINGS:  Head CT:    There is no acute intracranial abnormality or detectable change in the appearance of the brain compared to the prior studies.  Brain volume, ventricular size and position are normal.  There is no hemorrhage,  mass or mass effect.  There are no definite regions of abnormal attenuation in the brain.  There is no abnormal enhancement on the delayed post-contrast images through the brain.    The calvarium is normal.  The included paranasal sinuses and mastoid air cells are clear.    CTA Neck:    Arch and great vessels:    There is a conventional left-sided 3 vessel arch.  The aortic arch and the origins of the great vessels are widely patent.  The arch is normal in caliber and contour.  The included left subclavian artery is patent.  There is known right subclavian artery stenosis.  This was present previously and better demonstrated on CTA chest dated 09/21/2022    Carotid arteries:    Right Carotid artery: The right common, internal and external carotid arteries are normal in caliber and contour and patent without stenosis, occlusion, thrombosis or dissection.  The internal carotid artery takes a medial retropharyngeal course.    Left Carotid artery: The left common, internal and external carotid arteries are also remain normal in caliber and contour without stenosis, occlusion, thrombosis or dissection.  The common carotid artery does take a slightly medial retropharyngeal course.    Vertebral arteries:    The vertebral arteries are essentially codominant and patent throughout their course in the neck.  These vessels are normal in caliber and contour.  There is no stenosis, occlusion, thrombosis or dissection.    Other:    This is a limited evaluation of the lungs but the included upper lungs are clear without infiltrate or mass.    CTA Head:    Anterior circulation:    The petrous and cavernous segments of the internal carotid arteries remain widely patent and normal in caliber and contour.  The supraclinoid internal carotid arteries are normal.  The carotid terminus is normal.  There is branching into the anterior middle cerebral arteries.  The left A1 segment is hypoplastic.  It is present and patent.  There is a  patent anterior communicating artery which helps supplied the left A2 segment.  The middle cerebral arteries are patent.  There is no large vessel occlusion or critical stenosis.  There is no thrombus, dissection or aneurysm.  There is no change.    Posterior circulation:    The vertebral and basilar arteries are normal in caliber and contour and widely patent.  There is no critical stenosis, occlusion, thrombosis or dissection.  The basilar tip is normal.  There is branching into the superior cerebellar and posterior cerebral arteries.    Dural sinuses, other:    The dural sinuses are patent.  The right transverse and sigmoid sinus are dominant compared to the smaller left-sided dural sinuses.      Impression    1. There is no acute intracranial abnormality.  2. The cervical vertebral and carotid arteries are widely patent and normal in caliber and contour without stenosis, occlusion, thrombosis or dissection.  3. There is known chronic stenosis/occlusion of the right subclavian artery which is also demonstrated on comparison CTA head and neck as well as prior CTA chest.  This is unchanged.      Electronically signed by: Scott Gonzalez MD  Date:    03/29/2023  Time:    15:52     No results found for this or any previous visit (from the past 2160 hour(s)).  No results found for this or any previous visit (from the past 2160 hour(s)).      Pathology:    Outside not available     ?   Assessment/Plan:   Abrasion of breast, left breast, initial encounter  -     Ambulatory referral/consult to Breast Surgery; Future; Expected date: 05/26/2023  -     Ambulatory referral/consult to Genetics; Future; Expected date: 05/26/2023  -     Mammo Digital Diagnostic Left with Alejandro; Future; Expected date: 05/19/2023  -     US Breast Left Limited; Future; Expected date: 05/19/2023    Takayasu's arteritis       1. Abrasion of breast, left breast, initial encounter    2. Takayasu's arteritis          Plan:     Problem List Items  Addressed This Visit    None  Visit Diagnoses       Abrasion of breast, left breast, initial encounter    -  Primary    Takayasu's arteritis              Left breast lesion:  Progressive left breast rash over the last 1-2 months despite courses of antibiotics, outside biopsy per report showing hemangioma.  Recommend obtaining outside records and imaging with left breast mammogram and ultrasound for further assessment of superficial abnormality as well as if underlying associated breast mass.  Personal medical history notable for Takayasu vasculitis but does not have other rheumatologic diagnosis/small-vessel vascularity or rash aside from recent left breast.  Her family history is concerning for mother with ovarian cancer at age 44 who has not had genetic testing.  Recommend referral to Genetics and breast surgery.    Follow-Up:   Route Chart for Scheduling    Med Onc Chart Routing      Follow up with physician 2 weeks.   Follow up with [facility]    Infusion scheduling note    Injection scheduling note    Labs    Imaging Other and mammogram   mammo and US, breast tumor board presentation next Thursday conference please   Pharmacy appointment    Other referrals          80 minutes of total time spent on the encounter, which includes face to face time and non-face to face time preparing to see the patient (eg, review of tests), Obtaining and/or reviewing separately obtained history, Documenting clinical information in the electronic or other health record, Independently interpreting results (not separately reported) and communicating results to the patient/family/caregiver, or Care coordination (not separately reported).

## 2023-05-20 ENCOUNTER — PATIENT MESSAGE (OUTPATIENT)
Dept: HEMATOLOGY/ONCOLOGY | Facility: CLINIC | Age: 43
End: 2023-05-20
Payer: COMMERCIAL

## 2023-05-25 ENCOUNTER — OFFICE VISIT (OUTPATIENT)
Dept: GENETICS | Facility: CLINIC | Age: 43
End: 2023-05-25
Payer: COMMERCIAL

## 2023-05-25 DIAGNOSIS — Z80.41 FAMILY HISTORY OF OVARIAN CANCER: Primary | ICD-10-CM

## 2023-05-25 DIAGNOSIS — S20.112A ABRASION OF BREAST, LEFT BREAST, INITIAL ENCOUNTER: ICD-10-CM

## 2023-05-25 DIAGNOSIS — Z80.51 FAMILY HISTORY OF KIDNEY CANCER: ICD-10-CM

## 2023-05-25 PROCEDURE — 99499 NO LOS: ICD-10-PCS | Mod: 95,,, | Performed by: INTERNAL MEDICINE

## 2023-05-25 PROCEDURE — 99499 UNLISTED E&M SERVICE: CPT | Mod: 95,,, | Performed by: INTERNAL MEDICINE

## 2023-05-25 NOTE — PROGRESS NOTES
"  Cancer Genetics  Hereditary/High Risk Clinic  Department of Hematology and Oncology  [facility]        Date of Service:  2023  Provider:  Scarlett Lopez MS, [facility] [place]    Patient Information  Name:  Kelly Dove  :  1980  MRN:  [id number]        Referring Provider: Louann Mora MD    Televisit Information  The patient location is: [place], [place].  The chief complaint leading to consultation is: as below.  Visit type: audiovisual.  Face-to-face time with patient: approximately 40 minutes.  Approximately 112 minutes in total were spent on this encounter, which includes face-to-face time and non-face-to-face time preparing to see the patient (e.g., review of tests), obtaining and/or reviewing separately obtained history, documenting clinical information in the electronic or other health record, independently interpreting results (not separately reported) and communicating results to the patient/family/caregiver, or care coordination (not separately reported).  Each patient to whom he or she provides medical services by telemedicine is:  (1) informed of the relationship between the physician and patient and the respective role of any other health care provider with respect to management of the patient; and (2) notified that he or she may decline to receive medical services by telemedicine and may withdraw from such care at any time.      SUBJECTIVE:      Chief Complaint: Genetic Counseling    History of Present Illness (HPI):  Kelly Dove ("Kelly"), 43 y.o., assigned female sex at birth is established with the [facility] Department of Hematology and Oncology but new to me.  She was referred by Medical Oncology for genetic cancer risk assessment given an abrasion of the left breast.    Focused Medical History:   Germline cancer-genetic testing:  No  Cancer:  No  Colon polyp:  No  Most recent colonoscopy: ~15 years ago, unsure of when to repeat  Most recent mammo: 2023  Dx mammo (05/10/2023) " [place] but [place] to [place] imaging/i  Most recent breast MRI:  Other benign tumor:  No  Abrasion of left breast (05/2023)  Benign lump of right breast (2015)  Ovarian cyst  Pancreatitis:  No  Pancreatic cyst:  No     Focused Surgical History  Unilateral Oophorectomy and partial hysterectomy - infection in uterus of delivery of 3rd child  Cervix and one ovary intact  Right Breast lump removed (2015)    Ancestry:  Ashkenazi Baptist ancestry:  No  Paternal:   Maternal: Colombian, Yoruba,     Focused Family History:  Consanguinity in ancestors:  No  Germline cancer-genetic testing in blood relatives:  No  Cancer in family:  Yes; there are no known blood relatives affected with cancer other than those mentioned in the pedigree below    Family Cancer Pedigree:             Review of Systems:  See HPI.      SUBJECTIVE:   Records Reviewed  Medical History  Problem List  Any pertinent, available Procedures and Pathology reports in both [facility] and [facility]    COUNSELING   Causes of cancer  Germline cancer genetic testing is the testing of genes associated with cancer, known as cancer susceptibility genes.  Just as these genes are inherited from parents, mutations in these genes can be inherited, as well.  A mutation in a cancer susceptibility gene adversely affects the gene's ability to prevent cancer; therefore, carriers of cancer susceptibility gene mutations may be at increased risk for certain cancers.     Only 5-10% cancers are caused by a cancer susceptibility gene mutation, meaning the cancer is genetic/hereditary; rather, most cancers are sporadic.  Causes of sporadic cancers may include environmental risk factors, lifestyle risk factors, and non-modifiable risk factors.  It is important to note that members of a family often share not only their genetics but also risk factors including environmental and lifestyle risk factors, so cancers can be familial.    Mutations in  BRCA1 and BRCA2 are associated with an increased risk for breast and ovarian cancer, in addition to male breast cancer, pancreatic, melanoma, and prostate cancer. Discussed that mutations in other genes may increase the risk of these cancers, in addition to other cancers.     Potential results of genetic testing, and their implications  Potential results of genetic testing include positive, negative, and variant of unknown significance (VUS).    A positive result indicates the presence of at least one clinically significant mutation, and the patient's associated cancer risks vary depending upon the cancer susceptibility gene(s) in which there is/are a mutation(s).  With a positive result, in some cases, depending upon the specific result and the patient's clinical history, modified risk management may be recommended, including measures for risk reduction and/or surveillance; however, modified management is not always an option.    A negative result indicates that no clinically significant mutations were identified in the gene(s) tested.    A VUS indicates that there is not presently enough data for the laboratory to make a determination as to whether the variant is clinically significant.  VUSs are not typically acted upon clinically.       The ability to interpret the meaning of a negative genetic testing result in genes associated with cancer with which the patient has not personally been affected, when done prior to testing the appropriate affected relative(s), is significantly limited.  A negative result in the patient does not indicate that she cannot develop cancer, and, in fact, the patient may even be at increased risk for cancer based on shared risk factors with affected relatives.  The most informative candidates for initial genetic testing in a family are those who have been affected with cancer.     Tyer Radhazik Score  Modified management may also be recommended, even with a result of no or unknown  significance, based upon risk assessment that incorporates the family history.      Breast Cancer Risk Stratification  Current estimated lifetime risk of breast cancer, as calculated utilizing the Tyrer-Cuzick risk-assessment model v8.0b:  8.68%.  This places the patient in the average-risk range according to current clinical guidelines.    As such, the NCCN guidelines recommend that you:  See a healthcare provider to review personal and family history, have your breast cancer risk assessed (your risk is not static and can change), discuss breast cancer risk reduction, and undergo a clinical breast exam.  This visit would typically be with a gynecologist or a breast healthcare specialist, should start at age 25, and should occur annually.  Practice breast awareness, which means that you are familiar with your breasts and perform periodic (I recommend at least monthly), consistent breast self-exams and promptly report to a healthcare provider (typically, a gynecologist or a breast healthcare specialist) any changes or concerns.  Breast awareness should begin by age 25.  Undergo a screening mammogram (preferably, one that is 3-D) annually.  This can be ordered by your primary care provider, gynecologist, or breast healthcare specialist and should begin at age 40.     Genetic mutation inheritance  If Crystal tests positive for a mutation, her first-degree relatives would each have a 50% chance of having the same mutation, and other, more distantly related blood-relatives would also be at risk of having the same mutation.       Genetic discrimination  The Genetic Information Nondiscrimination Act (MICHELINE) is U.S. federal legislation that provides some protections against use of an individual's genetic information by their health insurer and by their employer.  Title I of MICHELINE prohibits most health insurers from utilizing an individual's genetic information to make decisions regarding insurance eligibility or premium  charges.  Title II of MICHELINE prohibits covered entities, including employers, from requesting the genetic information of employees and applicants.  MICHELINE does not protect individuals from genetic discrimination toward health insurance obtained through a job with the  or through the Federal Employees Health Benefits Plan; from genetic discrimination by employers with fewer than 15 employees or if employed by the ; or from genetic discrimination by any other type of policies/entities, including but not limited to life insurance, disability insurance, long-term care insurance,  benefits, and [employer] [facility] benefits.     Genetic testing logistics  An outside laboratory would perform the testing after a saliva sample is collected by the patient at home.  With genetic testing, there is a potential for the patient to incur out-of-pocket costs.  Results can take 2-3 weeks.  Post-test genetic counseling can be conducted once the genetic testing results are available.     Assessment/Plan  Based on the information provided by Kelly, she meets current criteria for genetic testing of hereditary breast and ovarian cancer syndrome according to current clinical guidelines. Kelly's clinical history, including personal history and/or family history, is strongly suggestive of a hereditary cancer syndrome in the family given the family history of ovarian cancer in her mother.     Offered germline cancer-genetic testing at this time versus deferring testing at this time or declining testing altogether.  Various test panel options were discussed. Kelly decided to proceed to genetic testing through the 2-gene BRCA1/BRCA2 Core Panel and 84-gene Multi-Cancer Panel (AIP, ALK, APC, ARTUR, AXIN2, BAP1, BARD1, BLM, BMPR1A, BRCA1, BRCA2, BRIP1, CASR, CDC73, CDH1, CDK4, CDKN1B, CDKN1C, CDKN2A, CEBPA, CHEK2, CTNNA1, DICER1, DIS3L2, EGFR, EPCAM, FH, FLCN, GATA2, GPC3, GREM1, HOXB13, HRAS, KIT, MAX, MEN1, MET, MITF,  MLH1, MSH2, MSH3, MSH6, MUTYH, NBN, NF1, NF2, NTHL1, PALB2, PDGFRA, PHOX2B, PMS2, POLD1, POLE, POT1, OEQIU5P, PTCH1, PTEN, RAD50, RAD51C, RAD51D, RB1, RECQL4, RET, RUNX1, SDHA, SDHAF2, SDHB, SDHC, SDHD, SMAD4, SMARCA4, SMARCB1, SMARCE1, STK11, SUFU, TERC, TERT, YILS279, TP53, TSC1, TSC2, VHL, WRN, WT1).  Kelly has provided her verbal informed consent to proceed. A saliva kit will be mailed.      Questions were encouraged and answered to the patient's satisfaction, and she verbalized understanding of information and agreement with the plan.         Signed,    Scarlett Lopez MS, [facility] [place]  Certified Genetic Counselor, Hereditary and High-Risk Clinic  Hematology/Oncology, [facility]    05/25/2023

## 2023-05-26 NOTE — PROGRESS NOTES
Breast Surgical Oncology  [place]    Date of Service: 2023    SUBJECTIVE:   Chief complaint: left breast skin changes    HISTORY OF PRESENT ILLNESS:   Kelly Dove is a 43 y.o. female who is kindly referred by Dr. Louann Mora for left breast skin changes.    She has a history of Takayasu's arteritis. She denies a history of breast cancer but does have a history of right benign breast biopsy. She reports left breast skin changes at two sites and subjective swelling since April. She was treated with antibiotics without improvement and progressive rash and lesion. She had punch biopsy in [place] which per patient report showed a cutaneous hemangioma. She developed an injury due to the tape from the dressing post biopsy. Her most recent mammogram in 2023 showed no abnormality. Given her family history of mother with ovarian cancer, she was recommended for genetic testing. This was performed last week and results are pending.     She has concern regarding the diagnostic processes that have taken place thus far. She had sutures removed by her PCP following biopsy in [place]. This concern stemmed from assessment by her PCP and prompted her consultation with Dr. Mora.     Her breast cancer risk factor profile is as follows: Menarche at 14, Menopause at N/A.  She is . Age at first live birth was 20. Family history of cancer is as follows: mother with ovarian cancer at age 44.     FAMILY HISTORY:     Family History   Problem Relation Age of Onset    Ovarian cancer Mother         PAST MEDICAL HISTORY:     Past Medical History:   Diagnosis Date    Takayasu's arteritis        SURGICAL HISTORY:     Past Surgical History:   Procedure Laterality Date    ABDOMINAL SURGERY      BREAST BIOPSY      HERNIA REPAIR      HYSTERECTOMY      OOPHORECTOMY      TONSILLECTOMY         SOCIAL HISTORY:     Social History     Tobacco Use    Smoking status: Former     Packs/day: 0.75     Types: Cigarettes     Quit  date: 2022     Years since quittin.6    Smokeless tobacco: Never   Substance Use Topics    Alcohol use: Yes     Comment: social    Drug use: Not Currently        MEDICATIONS/ALLERGIES:     Current Outpatient Medications:     acetaminophen (TYLENOL) 500 MG tablet, Take 2 tablets (1,000 mg total) by mouth every 8 (eight) hours as needed for Pain., Disp: 42 tablet, Rfl: 0    adalimumab (HUMIRA PEN) PnKt injection, Inject 1 pen (40 mg total) into the skin every 14 (fourteen) days. (Patient not taking: Reported on 2023), Disp: 2 pen, Rfl: 11    aspirin 81 MG Chew, Take 81 mg by mouth once daily., Disp: , Rfl:     butalbital-acetaminophen-caffeine -40 mg (FIORICET, ESGIC) -40 mg per tablet, Take 1 tablet by mouth every 6 (six) hours as needed for Headaches (Limit to 3 tab per week to avoid rebound headache)., Disp: 12 tablet, Rfl: 5    cyanocobalamin, vitamin B-12, (VITAMIN B-12 ORAL), Take by mouth., Disp: , Rfl:     dapagliflozin (FARXIGA) 10 mg tablet, Take 1 tablet (10 mg total) by mouth once daily., Disp: 90 tablet, Rfl: 3    DULoxetine (CYMBALTA) 60 MG capsule, TAKE ONE CAPSULE BY MOUTH TWICE DAILY, Disp: 180 capsule, Rfl: 3    EPINEPHrine (EPIPEN) 0.3 mg/0.3 mL AtIn, Inject 0.3 mLs (0.3 mg total) into the muscle once. for 1 dose (Patient not taking: Reported on 2023), Disp: 0.3 mL, Rfl: 0    fluticasone propionate (FLONASE) 50 mcg/actuation nasal spray, 1 spray (50 mcg total) by Each Nostril route once daily., Disp: 16 g, Rfl: 3    folic acid (FOLVITE) 1 MG tablet, Take 1 tablet (1 mg total) by mouth 3 (three) times daily., Disp: 90 tablet, Rfl: 4    methotrexate 25 mg/mL injection, Inject 0.8 mL into the skin weekly, Disp: 10 mL, Rfl: 1    metoprolol succinate (TOPROL-XL) 50 MG 24 hr tablet, Take 2 tablets (100 mg total) by mouth every evening., Disp: 90 tablet, Rfl: 3    pantoprazole (PROTONIX) 40 MG tablet, Take 40 mg by mouth once daily., Disp: , Rfl:     predniSONE (DELTASONE) 10  MG tablet, Take 2 tablets (20 mg total) by mouth once daily., Disp: 14 tablet, Rfl: 0    pregabalin (LYRICA) 150 MG capsule, Take 1 capsule (150 mg total) by mouth 2 (two) times daily., Disp: 180 capsule, Rfl: 1    promethazine (PHENERGAN) 25 MG tablet, 25 MG WITH EVERY MTX WHICH IS ONCE A WEEK, Disp: 20 tablet, Rfl: 0    sacubitriL-valsartan (ENTRESTO) 24-26 mg per tablet, Take 1 tablet by mouth 2 (two) times daily., Disp: 90 tablet, Rfl: 3    spironolactone (ALDACTONE) 25 MG tablet, Take 12.5 mg (half a tab) daily for 1 week. Labs next week. If K and creat are stable increase to 25 g daily., Disp: 90 tablet, Rfl: 3    vitamin D (VITAMIN D3) 1000 units Tab, Take 1 tablet (1,000 Units total) by mouth once daily., Disp: 30 tablet, Rfl: 2  Review of patient's allergies indicates:   Allergen Reactions    Iodinated contrast media Anaphylaxis and Swelling     Patient experienced laryngeal edema on 4/10 and required and epipen injection and was sent to the hospital. If contrast is medically necessary in the future, patient will need to be premedicated and scanned at a hospital.        REVIEW OF SYSTEMS:   I have reviewed 12 systems, including 2 points per system. Pertinent reported positives are: none    PHYSICAL EXAM:   General: The patient appears well and is in no acute distress.     Chaperon present for examination.   BREAST EXAM  No Asymmetry  Right:  - Mass: No  - Skin change: No  - Nipple Discharge: No  - Nipple retraction: No  - Axillary LAD: No  Left:   - Mass: No  - Skin change: condensed area of superficial telangectasias at 9:00, 4 CMFN, just medial to this is superficial vasculature with a healing scab  - Nipple Discharge: No  - Nipple retraction: No  - Axillary LAD: No    IMAGING:   Images were personally reviewed for internal studies. Report for recent diagnostic imaging reviewed in CareEverywhere.         Results for orders placed during the hospital encounter of 01/10/23    Mammo Digital Screening Bilat  w/ Alejandro    Narrative  Result:  Mammo Digital Screening Bilat w/ Alejandro    History:  Patient is 42 y.o. and is seen for a screening mammogram.      Films Compared:  Compared to: 12/16/2021 Mammo Digital Screening Bilat w/ Alejandro and 05/05/2014 Mammo Previous    Findings:  This procedure was performed using tomosynthesis.  Computer-aided detection was utilized in the interpretation of this examination.    The breasts have scattered areas of fibroglandular density. There is no evidence of suspicious masses, microcalcifications or architectural distortion.    Impression  No mammographic evidence of malignancy.    BI-RADS Category 1: Negative    Recommendation:  Routine screening mammogram in 1 year is recommended.    Your estimated lifetime risk of breast cancer (to age 85) based on Tyrer-Cuzick risk assessment model is 8.68 %.  According to the American Cancer Society, patients with a lifetime breast cancer risk of 20% or higher might benefit from supplemental screening tests. ??        PATHOLOGY:       ASSESSMENT:     1. Breast skin changes    2. Abrasion of breast, left breast, initial encounter          PLAN:     She was reassured that her clinical exam, imaging and pathology are consistent with the provided diagnosis. I explained that her findings are not consistent with inflammatory breast cancer. I do concur with Dr. Mora's assessment and she will be presented at [facility]. She voiced continued concern regarding the diagnosis. Again, this has stemmed from dissonance between her medical professionals up until this point. I recommend a breast MRI to evaluate both the cutaneous breast findings and the subjective swelling.       Tracey Kraft M.D.

## 2023-05-29 ENCOUNTER — OFFICE VISIT (OUTPATIENT)
Dept: SURGERY | Facility: CLINIC | Age: 43
End: 2023-05-29
Payer: COMMERCIAL

## 2023-05-29 DIAGNOSIS — S20.112A ABRASION OF BREAST, LEFT BREAST, INITIAL ENCOUNTER: Primary | ICD-10-CM

## 2023-05-29 DIAGNOSIS — R23.4 BREAST SKIN CHANGES: ICD-10-CM

## 2023-05-29 PROCEDURE — 4010F ACE/ARB THERAPY RXD/TAKEN: CPT | Mod: CPTII,S$GLB,, | Performed by: SURGERY

## 2023-05-29 PROCEDURE — 99999 PR PBB SHADOW E&M-EST. PATIENT-LVL III: CPT | Mod: PBBFAC,,, | Performed by: SURGERY

## 2023-05-29 PROCEDURE — 99999 PR PBB SHADOW E&M-EST. PATIENT-LVL III: ICD-10-PCS | Mod: PBBFAC,,, | Performed by: SURGERY

## 2023-05-29 PROCEDURE — 99213 OFFICE O/P EST LOW 20 MIN: CPT | Mod: S$GLB,,, | Performed by: SURGERY

## 2023-05-29 PROCEDURE — 99213 PR OFFICE/OUTPT VISIT, EST, LEVL III, 20-29 MIN: ICD-10-PCS | Mod: S$GLB,,, | Performed by: SURGERY

## 2023-05-29 PROCEDURE — 1159F PR MEDICATION LIST DOCUMENTED IN MEDICAL RECORD: ICD-10-PCS | Mod: CPTII,S$GLB,, | Performed by: SURGERY

## 2023-05-29 PROCEDURE — 1159F MED LIST DOCD IN RCRD: CPT | Mod: CPTII,S$GLB,, | Performed by: SURGERY

## 2023-05-29 PROCEDURE — 4010F PR ACE/ARB THEARPY RXD/TAKEN: ICD-10-PCS | Mod: CPTII,S$GLB,, | Performed by: SURGERY

## 2023-05-29 RX ORDER — FLUCONAZOLE 150 MG/1
150 TABLET ORAL ONCE
COMMUNITY
Start: 2023-05-19 | End: 2023-08-11

## 2023-05-29 RX ORDER — DIAZEPAM 5 MG/1
5 TABLET ORAL
Qty: 1 TABLET | Refills: 0 | Status: SHIPPED | OUTPATIENT
Start: 2023-05-29 | End: 2023-08-11

## 2023-05-31 ENCOUNTER — HOSPITAL ENCOUNTER (OUTPATIENT)
Dept: RADIOLOGY | Facility: HOSPITAL | Age: 43
Discharge: HOME | End: 2023-05-31
Attending: SURGERY
Payer: COMMERCIAL

## 2023-05-31 DIAGNOSIS — S20.112A ABRASION OF BREAST, LEFT BREAST, INITIAL ENCOUNTER: ICD-10-CM

## 2023-05-31 PROCEDURE — 25500020 PHARM REV CODE 255: Performed by: SURGERY

## 2023-05-31 PROCEDURE — 77049 MRI BREAST C-+ W/CAD BI: CPT | Mod: TC

## 2023-05-31 PROCEDURE — A9577 INJ MULTIHANCE: HCPCS | Performed by: SURGERY

## 2023-05-31 PROCEDURE — 77049 MRI BREAST C-+ W/CAD BI: CPT | Mod: 26,,, | Performed by: RADIOLOGY

## 2023-05-31 PROCEDURE — 77049 MRI BREAST W/WO CONTRAST, W/CAD, BILATERAL: ICD-10-PCS | Mod: 26,,, | Performed by: RADIOLOGY

## 2023-05-31 RX ADMIN — GADOBENATE DIMEGLUMINE 15 ML: 529 INJECTION, SOLUTION INTRAVENOUS at 11:05

## 2023-06-01 ENCOUNTER — OFFICE VISIT (OUTPATIENT)
Dept: DERMATOLOGY | Facility: CLINIC | Age: 43
End: 2023-06-01
Payer: COMMERCIAL

## 2023-06-01 ENCOUNTER — PATIENT MESSAGE (OUTPATIENT)
Dept: SURGERY | Facility: CLINIC | Age: 43
End: 2023-06-01
Payer: COMMERCIAL

## 2023-06-01 ENCOUNTER — TELEPHONE (OUTPATIENT)
Dept: SURGERY | Facility: CLINIC | Age: 43
End: 2023-06-01
Payer: COMMERCIAL

## 2023-06-01 DIAGNOSIS — N62 MACROMASTIA: Primary | ICD-10-CM

## 2023-06-01 DIAGNOSIS — Q82.8 DIFFUSE DERMAL ANGIOMATOSIS: Primary | ICD-10-CM

## 2023-06-01 PROCEDURE — 4010F PR ACE/ARB THEARPY RXD/TAKEN: ICD-10-PCS | Mod: CPTII,95,, | Performed by: DERMATOLOGY

## 2023-06-01 PROCEDURE — 4010F ACE/ARB THERAPY RXD/TAKEN: CPT | Mod: CPTII,95,, | Performed by: DERMATOLOGY

## 2023-06-01 PROCEDURE — 1160F PR REVIEW ALL MEDS BY PRESCRIBER/CLIN PHARMACIST DOCUMENTED: ICD-10-PCS | Mod: CPTII,95,, | Performed by: DERMATOLOGY

## 2023-06-01 PROCEDURE — 1159F PR MEDICATION LIST DOCUMENTED IN MEDICAL RECORD: ICD-10-PCS | Mod: CPTII,95,, | Performed by: DERMATOLOGY

## 2023-06-01 PROCEDURE — 1159F MED LIST DOCD IN RCRD: CPT | Mod: CPTII,95,, | Performed by: DERMATOLOGY

## 2023-06-01 PROCEDURE — 99204 PR OFFICE/OUTPT VISIT, NEW, LEVL IV, 45-59 MIN: ICD-10-PCS | Mod: 95,,, | Performed by: DERMATOLOGY

## 2023-06-01 PROCEDURE — 1160F RVW MEDS BY RX/DR IN RCRD: CPT | Mod: CPTII,95,, | Performed by: DERMATOLOGY

## 2023-06-01 PROCEDURE — 99204 OFFICE O/P NEW MOD 45 MIN: CPT | Mod: 95,,, | Performed by: DERMATOLOGY

## 2023-06-01 RX ORDER — LIDOCAINE 50 MG/G
OINTMENT TOPICAL
Qty: 50 G | Refills: 5 | Status: SHIPPED | OUTPATIENT
Start: 2023-06-01

## 2023-06-01 RX ORDER — MUPIROCIN 20 MG/G
OINTMENT TOPICAL
Qty: 60 G | Refills: 3 | Status: SHIPPED | OUTPATIENT
Start: 2023-06-01

## 2023-06-01 NOTE — Clinical Note
Tracey Jarrett.  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  I discussed with the patient that the treatment indicated is typically breast reduction.  The patient seemed interested in this.  Would you like for me to do a repeat skin biopsy to see if a dermatopathologist who read it as diffuse dermal angiomatosis? or can the patient proceed with breast reduction surgery?  Also, I do not know how or if the patient's history of Takayasu's arteritis affects her candidacy for surgery.  Thanks,  Kelin

## 2023-06-01 NOTE — TELEPHONE ENCOUNTER
----- Message from Tracey Kraft MD sent at 6/1/2023  9:33 AM CDT -----  That sounds like a good plan. Thanks, we can message her information regarding reduction and place a referral to plastics    ----- Message -----  From: Kelin Griffith MD  Sent: 6/1/2023   9:01 AM CDT  To: MD Kolby Oseugera Lindsey.    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  I discussed with the patient that the treatment indicated is typically breast reduction.  The patient seemed interested in this.  Would you like for me to do a repeat skin biopsy to see if a dermatopathologist who read it as diffuse dermal angiomatosis? or can the patient proceed with breast reduction surgery?  Also, I do not know how or if the patient's history of Takayasu's arteritis affects her candidacy for surgery.    Thanks,    Kelin

## 2023-06-01 NOTE — PROGRESS NOTES
Subjective:      Patient ID:  Kelly Dove is a 43 y.o. female who presents for No chief complaint on file.    The patient location is: home  The chief complaint leading to consultation is: lesions    Visit type: audiovisual    Face to Face time with patient: 15  20 minutes of total time spent on the encounter, which includes face to face time and non-face to face time preparing to see the patient (eg, review of tests), Obtaining and/or reviewing separately obtained history, Documenting clinical information in the electronic or other health record, Independently interpreting results (not separately reported) and communicating results to the patient/family/caregiver, or Care coordination (not separately reported).         Each patient to whom he or she provides medical services by telemedicine is:  (1) informed of the relationship between the physician and patient and the respective role of any other health care provider with respect to management of the patient; and (2) notified that he or she may decline to receive medical services by telemedicine and may withdraw from such care at any time.    Notes:     Hx of Takayasu's arteritis (on Humira and MTX, Dr. Rod rheum at [facility]), s/p biopsy by breast surgeon in [place] showing hemangioma    History of Present Illness: The patient presents with chief complaint of lesions, boils.  Location: breasts  Duration: 2 months  Signs/Symptoms: drainage, painful    Prior treatments: several courses of antibiotics           Review of Systems   Constitutional:  Negative for fever and chills.   Gastrointestinal:  Negative for nausea and vomiting.   Skin:  Positive for activity-related sunscreen use. Negative for daily sunscreen use and recent sunburn.   Hematologic/Lymphatic: Does not bruise/bleed easily.     Objective:   Physical Exam   Constitutional: She appears well-developed and well-nourished. No distress.   Neurological: She is alert and oriented  to person, place, and time. She is not disoriented.   Psychiatric: She has a normal mood and affect.   Skin:   Areas Examined (abnormalities noted in diagram):   Head / Face Inspection Performed  Neck Inspection Performed  Chest / Axilla Inspection Performed  Back Inspection Performed  RUE Inspected  LUE Inspection Performed                        Assessment / Plan:        Diffuse dermal angiomatosis  -     LIDOcaine (XYLOCAINE) 5 % Oint ointment; AAA TID to affected areas  Dispense: 50 g; Refill: 5  -     mupirocin (BACTROBAN) 2 % ointment; AAA TID with Q-tip. Antibiotic ointment.  Dispense: 60 g; Refill: 3  -     Biopsy showing hemangioma which could be c/w with angiomatosis due to pendulous breast tissue.  Discussed breast reduction is indicated for this condition, will discuss with Dr. Kraft.  Discussed that history of Takayasu's arteritis may further be compounding and worsening the vascular supply of the local dermal blood vessels. Will start above meds for wound care and symptomatic treatment.               Follow up in about 2 months (around 8/1/2023).

## 2023-06-01 NOTE — TELEPHONE ENCOUNTER
Referral to plastics faxed over to Dr DAMIR John's office today. Receipt confirmed today, @ 11:17.

## 2023-06-12 DIAGNOSIS — I50.42 CHRONIC COMBINED SYSTOLIC AND DIASTOLIC HEART FAILURE: ICD-10-CM

## 2023-06-13 RX ORDER — SACUBITRIL AND VALSARTAN 24; 26 MG/1; MG/1
TABLET, FILM COATED ORAL
Qty: 90 TABLET | Refills: 3 | Status: SHIPPED | OUTPATIENT
Start: 2023-06-13 | End: 2023-12-28

## 2023-06-19 NOTE — TELEPHONE ENCOUNTER
Spoke with pt- she is still holding MTX and Humira related to diagnostic work up for breast.  She would like to call [facility] when she has been cleared to resume therapy. De-enrolling at [facility] at this time.

## 2023-08-06 ENCOUNTER — NURSE TRIAGE (OUTPATIENT)
Dept: ADMINISTRATIVE | Facility: CLINIC | Age: 43
End: 2023-08-06
Payer: COMMERCIAL

## 2023-08-06 NOTE — TELEPHONE ENCOUNTER
"Spoke with patient who states she has swelling in the left leg, ankles, and both hands.  She also has dizziness that comes and goes.  Patient states her heart is beating funny and states it was beating rapidly earlier.  She does not know her current heart rate. Advised that patient call [id number].  Patient verbalized understanding.     Reason for Disposition   [1] Dizziness, lightheadedness, or weakness AND [2] heart beating very rapidly (e.g., > 140 / minute)     Patient is not sure what her heart rate is.  She states her heart was beating rapidly earlier.   She also stated her heart was beating "funny."    Additional Information   Negative: Passed out (i.e., lost consciousness, collapsed and was not responding)   Negative: Shock suspected (e.g., cold/pale/clammy skin, too weak to stand, low BP, rapid pulse)   Negative: Difficult to awaken or acting confused (e.g., disoriented, slurred speech)   Negative: Visible sweat on face or sweat dripping down face   Negative: Unable to walk, or can only walk with assistance (e.g., requires support)   Negative: [1] Received SHOCK from implantable cardiac defibrillator AND [2] persisting symptoms (i.e., palpitations, lightheadedness)    Protocols used: Heart Rate and Heartbeat [id number]    "

## 2023-08-07 NOTE — TELEPHONE ENCOUNTER
"Spoke w/pt / phone, states she is in [place], went to hospital there and she will see Dr. Ezio Levi. / "heart failure MD", informed her to call if she needs anything, she VU  "

## 2023-08-11 ENCOUNTER — OFFICE VISIT (OUTPATIENT)
Dept: TRANSPLANT | Facility: CLINIC | Age: 43
End: 2023-08-11
Payer: COMMERCIAL

## 2023-08-11 VITALS
HEIGHT: 66 IN | BODY MASS INDEX: 47.09 KG/M2 | SYSTOLIC BLOOD PRESSURE: 125 MMHG | DIASTOLIC BLOOD PRESSURE: 73 MMHG | WEIGHT: 293 LBS

## 2023-08-11 DIAGNOSIS — I50.42 CHRONIC COMBINED SYSTOLIC AND DIASTOLIC HEART FAILURE: Primary | ICD-10-CM

## 2023-08-11 DIAGNOSIS — I10 BENIGN ESSENTIAL HTN: ICD-10-CM

## 2023-08-11 DIAGNOSIS — G47.33 OSA (OBSTRUCTIVE SLEEP APNEA): ICD-10-CM

## 2023-08-11 DIAGNOSIS — I73.9 PVD (PERIPHERAL VASCULAR DISEASE): ICD-10-CM

## 2023-08-11 DIAGNOSIS — I77.1 SUBCLAVIAN ARTERIAL STENOSIS: ICD-10-CM

## 2023-08-11 DIAGNOSIS — M31.4 TAKAYASU'S ARTERITIS: ICD-10-CM

## 2023-08-11 DIAGNOSIS — E66.01 SEVERE OBESITY (BMI >= 40): ICD-10-CM

## 2023-08-11 PROCEDURE — 99215 OFFICE O/P EST HI 40 MIN: CPT | Mod: S$GLB,,, | Performed by: INTERNAL MEDICINE

## 2023-08-11 PROCEDURE — 4010F ACE/ARB THERAPY RXD/TAKEN: CPT | Mod: CPTII,S$GLB,, | Performed by: INTERNAL MEDICINE

## 2023-08-11 PROCEDURE — 99999 PR PBB SHADOW E&M-EST. PATIENT-LVL III: ICD-10-PCS | Mod: PBBFAC,,, | Performed by: INTERNAL MEDICINE

## 2023-08-11 PROCEDURE — 4010F PR ACE/ARB THEARPY RXD/TAKEN: ICD-10-PCS | Mod: CPTII,S$GLB,, | Performed by: INTERNAL MEDICINE

## 2023-08-11 PROCEDURE — 3078F PR MOST RECENT DIASTOLIC BLOOD PRESSURE < 80 MM HG: ICD-10-PCS | Mod: CPTII,S$GLB,, | Performed by: INTERNAL MEDICINE

## 2023-08-11 PROCEDURE — 1159F PR MEDICATION LIST DOCUMENTED IN MEDICAL RECORD: ICD-10-PCS | Mod: CPTII,S$GLB,, | Performed by: INTERNAL MEDICINE

## 2023-08-11 PROCEDURE — 1160F RVW MEDS BY RX/DR IN RCRD: CPT | Mod: CPTII,S$GLB,, | Performed by: INTERNAL MEDICINE

## 2023-08-11 PROCEDURE — 1160F PR REVIEW ALL MEDS BY PRESCRIBER/CLIN PHARMACIST DOCUMENTED: ICD-10-PCS | Mod: CPTII,S$GLB,, | Performed by: INTERNAL MEDICINE

## 2023-08-11 PROCEDURE — 3008F PR BODY MASS INDEX (BMI) DOCUMENTED: ICD-10-PCS | Mod: CPTII,S$GLB,, | Performed by: INTERNAL MEDICINE

## 2023-08-11 PROCEDURE — 99999 PR PBB SHADOW E&M-EST. PATIENT-LVL III: CPT | Mod: PBBFAC,,, | Performed by: INTERNAL MEDICINE

## 2023-08-11 PROCEDURE — 3074F PR MOST RECENT SYSTOLIC BLOOD PRESSURE < 130 MM HG: ICD-10-PCS | Mod: CPTII,S$GLB,, | Performed by: INTERNAL MEDICINE

## 2023-08-11 PROCEDURE — 99215 PR OFFICE/OUTPT VISIT, EST, LEVL V, 40-54 MIN: ICD-10-PCS | Mod: S$GLB,,, | Performed by: INTERNAL MEDICINE

## 2023-08-11 PROCEDURE — 3078F DIAST BP <80 MM HG: CPT | Mod: CPTII,S$GLB,, | Performed by: INTERNAL MEDICINE

## 2023-08-11 PROCEDURE — 3008F BODY MASS INDEX DOCD: CPT | Mod: CPTII,S$GLB,, | Performed by: INTERNAL MEDICINE

## 2023-08-11 PROCEDURE — 3074F SYST BP LT 130 MM HG: CPT | Mod: CPTII,S$GLB,, | Performed by: INTERNAL MEDICINE

## 2023-08-11 PROCEDURE — 1159F MED LIST DOCD IN RCRD: CPT | Mod: CPTII,S$GLB,, | Performed by: INTERNAL MEDICINE

## 2023-08-11 NOTE — PROGRESS NOTES
HPI:  Ms. Dove is a very pleasant  43 y.o. year old white female with Takayasu's arteritis, severe obesity, HTN who was referred by our colleague Dr. Teofilo Trimble for evaluation and management of newly diagnosed CMP. This is her 3rd visit with me. Patient was diagnosed with Takayasu's arteriris over a year ago (followed by Dr. Rea (Rheumatology) and remains stable on Humira and methotrexate). She underwent a coronary angiogram that showed normal coronary arteries but had a right subclavian artery stenosis. Clinically reported NYHA class II-III symptoms. Occasional PND. Of note her most recent echo showed a dropped in her LV EF (from 55% to 35-40% by my read). During her 1st visit with me she was not on HF GDMT so I started low dose Entresto and 24/26 mg BID and metoprolol succinate 25 mg at night. I had further increased her metoprolol to 50 mg at night then 100 mg and added spironolactone 25 mg daily and Dapagliflozin. She did have a ED visit while vacationing in [place] for atypical chest pain. Had a full cardiac work-up which was negative. Today she reports feeling better. She does feel a difference (has more energy) since her GDMT has been optimized.      2D Echo with CFD done on 2022  The left ventricle is mildly enlarged with mild eccentric hypertrophy and moderately decreased systolic function.  The estimated ejection fraction is 35%.  Grade I left ventricular diastolic dysfunction.  Normal right ventricular size with normal right ventricular systolic function.  Mild aortic regurgitation.  Intermediate central venous pressure (8 mmHg).     Past Medical History:   Diagnosis Date    Takayasu's arteritis      Past Surgical History:   Procedure Laterality Date    ABDOMINAL SURGERY      BREAST BIOPSY      HERNIA REPAIR      HYSTERECTOMY      OOPHORECTOMY      TONSILLECTOMY       OB History          3    Para   3    Term   3       0    AB   0    Living             SAB   0     "IAB   0    Ectopic   0    Multiple        Live Births                   Review of Systems   Constitutional: Negative. Negative for chills, decreased appetite, diaphoresis, fever, malaise/fatigue, night sweats, weight gain and weight loss.   Eyes: Negative.    Cardiovascular:  Positive for dyspnea on exertion. Negative for chest pain, claudication, cyanosis, irregular heartbeat, leg swelling, near-syncope, orthopnea, palpitations, paroxysmal nocturnal dyspnea and syncope.   Respiratory:  Negative for cough, hemoptysis and shortness of breath.    Endocrine: Negative.    Hematologic/Lymphatic: Negative.    Skin:  Negative for color change, dry skin and nail changes.   Musculoskeletal: Negative.    Gastrointestinal: Negative.    Genitourinary: Negative.    Neurological:  Negative for weakness.       Objective:   Blood pressure 125/73, height 5' 6" (1.676 m), weight (!) 145.6 kg (320 lb 15.8 oz).body mass index is 51.81 kg/m².  Physical Exam  Vitals and nursing note reviewed.   Constitutional:       Appearance: She is well-developed.   HENT:      Head: Normocephalic.   Eyes:      Pupils: Pupils are equal, round, and reactive to light.   Neck:      Vascular: No JVD.   Cardiovascular:      Rate and Rhythm: Normal rate and regular rhythm.      Chest Wall: PMI is displaced.      Pulses: Intact distal pulses.      Heart sounds: Normal heart sounds. No murmur heard.     No friction rub. No gallop.   Pulmonary:      Effort: Pulmonary effort is normal.      Breath sounds: Normal breath sounds. No wheezing or rales.   Abdominal:      General: Bowel sounds are normal.      Palpations: Abdomen is soft.   Musculoskeletal:      Cervical back: Neck supple.   Neurological:      Mental Status: She is alert and oriented to person, place, and time.         Labs:    Chemistry        Component Value Date/Time     04/20/2023 0829    K 5.0 04/20/2023 0829     04/20/2023 0829    CO2 24 04/20/2023 0829    BUN 8 04/20/2023 0829    " CREATININE 0.7 05/31/2023 1010    GLU 92 04/20/2023 0829        Component Value Date/Time    CALCIUM 9.7 04/20/2023 0829    ALKPHOS 60 04/02/2023 1758    AST 65 (H) 04/02/2023 1758    ALT 84 (H) 04/02/2023 1758    BILITOT 0.6 04/02/2023 1758    ESTGFRAFRICA >60.0 06/02/2022 0859    EGFRNONAA >60.0 06/02/2022 0859          Magnesium   Date Value Ref Range Status   04/02/2023 2.1 1.6 - 2.6 mg/dL Final     Lab Results   Component Value Date    WBC 5.94 04/02/2023    HGB 15.1 04/02/2023    HCT 44.3 04/02/2023     04/02/2023     Lab Results   Component Value Date    INR 1.0 09/21/2022     BNP   Date Value Ref Range Status   11/30/2022 <10 0 - 99 pg/mL Final     Comment:     Values of less than 100 pg/ml are consistent with non-CHF populations.   10/28/2021 <10 0 - 99 pg/mL Final     Comment:     Values of less than 100 pg/ml are consistent with non-CHF populations.       Assessment:      1. Chronic combined systolic and diastolic heart failure    2. Takayasu's arteritis    3. Subclavian arterial stenosis    4. PVD (peripheral vascular disease)    5. Severe obesity (BMI >= 40)    6. FUNMILAYO (obstructive sleep apnea)    7. Benign essential HTN        Plan:   Stage C HFrEF doing well now with optimized GDMT.  Will get a 2D echo to reassess her LV post GDMT optimization   Recommend 2 gram sodium restriction and 1500cc fluid restriction.  Encourage physical activity with graded exercise program.  Requested patient to weigh themselves daily, and to notify us if their weight increases by more than 3 lbs in 1 day or 5 lbs in 1 week.   She is moving to [place] ([place]) and will transition care at [facility]      Kristopher Holguin MD

## 2023-08-14 ENCOUNTER — CLINICAL SUPPORT (OUTPATIENT)
Dept: CARDIOLOGY | Facility: HOSPITAL | Age: 43
End: 2023-08-14
Attending: INTERNAL MEDICINE
Payer: COMMERCIAL

## 2023-08-14 VITALS — WEIGHT: 293 LBS | BODY MASS INDEX: 47.09 KG/M2 | HEIGHT: 66 IN

## 2023-08-14 DIAGNOSIS — I50.42 CHRONIC COMBINED SYSTOLIC AND DIASTOLIC HEART FAILURE: ICD-10-CM

## 2023-08-14 LAB
ASCENDING AORTA: 2.81 CM
AV INDEX (PROSTH): 0.64
AV MEAN GRADIENT: 4 MMHG
AV PEAK GRADIENT: 7 MMHG
AV VALVE AREA BY VELOCITY RATIO: 2.56 CM²
AV VALVE AREA: 2.53 CM²
AV VELOCITY RATIO: 0.64
BSA FOR ECHO PROCEDURE: 2.6 M2
CV ECHO LV RWT: 0.31 CM
DOP CALC AO PEAK VEL: 1.29 M/S
DOP CALC AO VTI: 30.5 CM
DOP CALC LVOT AREA: 4 CM2
DOP CALC LVOT DIAMETER: 2.25 CM
DOP CALC LVOT PEAK VEL: 0.83 M/S
DOP CALC LVOT STROKE VOLUME: 77.1 CM3
DOP CALCLVOT PEAK VEL VTI: 19.4 CM
E WAVE DECELERATION TIME: 218.38 MSEC
E/A RATIO: 1.15
E/E' RATIO: 6.8 M/S
ECHO LV POSTERIOR WALL: 0.92 CM (ref 0.6–1.1)
FRACTIONAL SHORTENING: 20 % (ref 28–44)
INTERVENTRICULAR SEPTUM: 1.18 CM (ref 0.6–1.1)
IVRT: 83.73 MSEC
LA MAJOR: 5.66 CM
LA MINOR: 5.63 CM
LA WIDTH: 3.9 CM
LEFT ATRIUM SIZE: 4.25 CM
LEFT ATRIUM VOLUME INDEX: 32.6 ML/M2
LEFT ATRIUM VOLUME: 79.53 CM3
LEFT INTERNAL DIMENSION IN SYSTOLE: 4.81 CM (ref 2.1–4)
LEFT VENTRICLE DIASTOLIC VOLUME INDEX: 74.32 ML/M2
LEFT VENTRICLE DIASTOLIC VOLUME: 181.34 ML
LEFT VENTRICLE MASS INDEX: 108 G/M2
LEFT VENTRICLE SYSTOLIC VOLUME INDEX: 44.3 ML/M2
LEFT VENTRICLE SYSTOLIC VOLUME: 108.19 ML
LEFT VENTRICULAR INTERNAL DIMENSION IN DIASTOLE: 6.02 CM (ref 3.5–6)
LEFT VENTRICULAR MASS: 264.53 G
LV LATERAL E/E' RATIO: 6.8 M/S
LV SEPTAL E/E' RATIO: 6.8 M/S
LVOT MG: 1.41 MMHG
LVOT MV: 0.56 CM/S
MV PEAK A VEL: 0.59 M/S
MV PEAK E VEL: 0.68 M/S
MV STENOSIS PRESSURE HALF TIME: 63.33 MS
MV VALVE AREA P 1/2 METHOD: 3.47 CM2
PISA TR MAX VEL: 2.01 M/S
PULM VEIN S/D RATIO: 1.1
PV PEAK D VEL: 0.49 M/S
PV PEAK S VEL: 0.54 M/S
RA MAJOR: 4.76 CM
RA PRESSURE ESTIMATED: 3 MMHG
RA WIDTH: 2.9 CM
RIGHT VENTRICULAR END-DIASTOLIC DIMENSION: 3.48 CM
RIGHT VENTRICULAR LENGTH IN DIASTOLE (APICAL 4-CHAMBER VIEW): 5.45 CM
RV MID DIAMA: 2.21 CM
RV TB RVSP: 5 MMHG
RV TISSUE DOPPLER FREE WALL SYSTOLIC VELOCITY 1 (APICAL 4 CHAMBER VIEW): 12.24 CM/S
SINUS: 3.03 CM
STJ: 2.49 CM
TDI LATERAL: 0.1 M/S
TDI SEPTAL: 0.1 M/S
TDI: 0.1 M/S
TR MAX PG: 16 MMHG
TRICUSPID ANNULAR PLANE SYSTOLIC EXCURSION: 2.63 CM
TV REST PULMONARY ARTERY PRESSURE: 19 MMHG
Z-SCORE OF LEFT VENTRICULAR DIMENSION IN END DIASTOLE: -6.89
Z-SCORE OF LEFT VENTRICULAR DIMENSION IN END SYSTOLE: -3.14

## 2023-08-14 PROCEDURE — 93306 ECHO (CUPID ONLY): ICD-10-PCS | Mod: 26,,, | Performed by: INTERNAL MEDICINE

## 2023-08-14 PROCEDURE — 93306 TTE W/DOPPLER COMPLETE: CPT | Mod: 26,,, | Performed by: INTERNAL MEDICINE

## 2023-08-14 PROCEDURE — 93306 TTE W/DOPPLER COMPLETE: CPT | Mod: PO

## 2023-08-28 ENCOUNTER — PATIENT MESSAGE (OUTPATIENT)
Dept: TRANSPLANT | Facility: CLINIC | Age: 43
End: 2023-08-28
Payer: COMMERCIAL

## 2023-10-30 DIAGNOSIS — I50.42 CHRONIC COMBINED SYSTOLIC AND DIASTOLIC HEART FAILURE: ICD-10-CM

## 2023-10-30 RX ORDER — METOPROLOL SUCCINATE 50 MG/1
100 TABLET, EXTENDED RELEASE ORAL NIGHTLY
Qty: 180 TABLET | Refills: 3 | Status: SHIPPED | OUTPATIENT
Start: 2023-10-30 | End: 2024-10-29

## 2023-11-09 DIAGNOSIS — R51.9 NONINTRACTABLE HEADACHE, UNSPECIFIED CHRONICITY PATTERN, UNSPECIFIED HEADACHE TYPE: ICD-10-CM

## 2023-11-09 DIAGNOSIS — G62.9 SMALL FIBER NEUROPATHY: ICD-10-CM

## 2023-11-09 RX ORDER — PREGABALIN 150 MG/1
150 CAPSULE ORAL 2 TIMES DAILY
Qty: 180 CAPSULE | Refills: 0 | Status: SHIPPED | OUTPATIENT
Start: 2023-11-09

## 2023-11-09 RX ORDER — BUTALBITAL, ACETAMINOPHEN AND CAFFEINE 50; 325; 40 MG/1; MG/1; MG/1
1 TABLET ORAL EVERY 6 HOURS PRN
Qty: 12 TABLET | Refills: 2 | Status: SHIPPED | OUTPATIENT
Start: 2023-11-09

## 2023-12-27 DIAGNOSIS — I50.42 CHRONIC COMBINED SYSTOLIC AND DIASTOLIC HEART FAILURE: ICD-10-CM

## 2023-12-28 RX ORDER — SACUBITRIL AND VALSARTAN 24; 26 MG/1; MG/1
TABLET, FILM COATED ORAL
Qty: 90 TABLET | Refills: 3 | Status: SHIPPED | OUTPATIENT
Start: 2023-12-28

## 2024-06-10 ENCOUNTER — PATIENT MESSAGE (OUTPATIENT)
Dept: ADMINISTRATIVE | Facility: HOSPITAL | Age: 44
End: 2024-06-10
Payer: COMMERCIAL

## 2024-07-09 ENCOUNTER — PATIENT MESSAGE (OUTPATIENT)
Dept: ADMINISTRATIVE | Facility: HOSPITAL | Age: 44
End: 2024-07-09
Payer: COMMERCIAL

## 2024-07-31 DIAGNOSIS — Z12.31 OTHER SCREENING MAMMOGRAM: ICD-10-CM

## 2025-01-13 DIAGNOSIS — I50.42 CHRONIC COMBINED SYSTOLIC AND DIASTOLIC HEART FAILURE: ICD-10-CM

## 2025-01-13 RX ORDER — METOPROLOL SUCCINATE 50 MG/1
TABLET, EXTENDED RELEASE ORAL
Qty: 180 TABLET | Refills: 3 | OUTPATIENT
Start: 2025-01-13

## 2025-08-19 ENCOUNTER — PATIENT MESSAGE (OUTPATIENT)
Dept: ADMINISTRATIVE | Facility: HOSPITAL | Age: 45
End: 2025-08-19
Payer: COMMERCIAL